# Patient Record
Sex: FEMALE | Race: WHITE | NOT HISPANIC OR LATINO | Employment: OTHER | URBAN - NONMETROPOLITAN AREA
[De-identification: names, ages, dates, MRNs, and addresses within clinical notes are randomized per-mention and may not be internally consistent; named-entity substitution may affect disease eponyms.]

---

## 2017-01-27 RX ORDER — TEMAZEPAM 30 MG/1
30 CAPSULE ORAL NIGHTLY PRN
Qty: 30 CAPSULE | Refills: 0 | Status: SHIPPED | OUTPATIENT
Start: 2017-01-27 | End: 2017-02-27 | Stop reason: SDUPTHER

## 2017-02-24 RX ORDER — BUPROPION HYDROCHLORIDE 300 MG/1
300 TABLET ORAL DAILY
Qty: 30 TABLET | Refills: 1 | Status: SHIPPED | OUTPATIENT
Start: 2017-02-24 | End: 2017-04-27 | Stop reason: SDUPTHER

## 2017-02-27 RX ORDER — TEMAZEPAM 30 MG/1
30 CAPSULE ORAL NIGHTLY PRN
Qty: 30 CAPSULE | Refills: 0 | Status: SHIPPED | OUTPATIENT
Start: 2017-02-27 | End: 2017-03-27 | Stop reason: SDUPTHER

## 2017-03-24 ENCOUNTER — PROCEDURE VISIT (OUTPATIENT)
Dept: INTERNAL MEDICINE | Facility: CLINIC | Age: 72
End: 2017-03-24

## 2017-03-24 VITALS
BODY MASS INDEX: 26.47 KG/M2 | WEIGHT: 164.7 LBS | DIASTOLIC BLOOD PRESSURE: 90 MMHG | SYSTOLIC BLOOD PRESSURE: 140 MMHG | HEIGHT: 66 IN

## 2017-03-24 DIAGNOSIS — Z13.820 ENCOUNTER FOR SCREENING FOR OSTEOPOROSIS: ICD-10-CM

## 2017-03-24 DIAGNOSIS — Z12.31 ENCOUNTER FOR SCREENING MAMMOGRAM FOR BREAST CANCER: ICD-10-CM

## 2017-03-24 DIAGNOSIS — Z01.419 WELL WOMAN EXAM WITH ROUTINE GYNECOLOGICAL EXAM: Primary | ICD-10-CM

## 2017-03-24 DIAGNOSIS — Z11.59 NEED FOR HEPATITIS C SCREENING TEST: ICD-10-CM

## 2017-03-24 DIAGNOSIS — E78.2 MIXED HYPERLIPIDEMIA: ICD-10-CM

## 2017-03-24 PROCEDURE — G0101 CA SCREEN;PELVIC/BREAST EXAM: HCPCS | Performed by: INTERNAL MEDICINE

## 2017-03-24 PROCEDURE — 88142 CYTOPATH C/V THIN LAYER: CPT | Performed by: INTERNAL MEDICINE

## 2017-03-24 NOTE — PROGRESS NOTES
"Subjective   History of Present Illness    Brunilda Horner is a 71 y.o. female who presents for GYN exam. She denies any GYN complaints. She performs BSE at home, no breast masses on self exam.  History of anxiety and depression, well controlled on current regimen.  History of hyperlipidemia, not on medications at present.        Sexually active?: Yes  Postmenopausal?: Yes  HRT?: No  Hysterectomy?: No    Date last pap:   History of abnormal Pap smear: yes - History of ASCUS  Date of last mammogram: .  History of abnormal mammogram: Yes. History of  Benign breast biopsy in  on the left.     /90  Ht 66\" (167.6 cm)  Wt 164 lb 11.2 oz (74.7 kg)  BMI 26.58 kg/m2    Past Medical History:   Diagnosis Date   • Anxiety     Generalized anxiety disorder    2015    • Blepharitis 2014   • Cellulitis of foot 2016   • Depression    • History of Papanicolaou smear of cervix 2009    negative   • Pure hypercholesterolemia 2015       Past Surgical History:   Procedure Laterality Date   • BREAST SURGERY  1994    Excision of mass, left breast. Mass, left breast   • COLONOSCOPY  2010    no polyps   • FOOT SURGERY  2001    Removal of tumor, radical resection of soft tissue, right foot. Recurrent plantar fibroma, plantar, right foot   • MAMMO STEREOTACTIC BREAST BIOPSY 1ST W WO DEVICE  1988    Needle localization and excision of micro-calcifications. Micro-calcifications, left breast   • SKIN BIOPSY  1991    Tangential excision keratoses left cheek. Exicison 25 papilloma, neck.       The following portions of the patient's history were reviewed and updated as appropriate: allergies, current medications, past family history, past medical history, past social history, past surgical history and problem list.    Review of Systems    Constitutional:  No fatigue, no weight loss, no weight gain, no fever, no chills   Respiratory: No dyspnea, no cough, no " hemoptysis, no wheezing, no pleuritic pain   Cardiovascular: No chest pain, no palpitations, no arrhythmia, no orthopnea, no nocturnal dyspnea, no edema, no claudication   Breasts: No discharge from nipple, No breast tenderness and No breast mass   Gastrointestinal: No loss of appetite, No dysphagia, No abdominal pain, No nausea, No vomiting, No change in bowel habits, No diarrhea, No constipation and No blood in stool   Genitourinary: No increased frequency of urination, No dysuria, No hematuria, No nocturia, No urinary incontinence, No vaginal discharge, No abnormal vaginal bleeding, No pelvic pain, No menstrual problem and No menopausal problem   Skin: No skin rash, No skin lesion, No dry skin, No pruritus and No nail problem   Neurologic: No headache, No dizziness, No lightheadedness, No syncope, No vertigo, No weakness, No numbness, No tremor and No paresthesia   Psychiatric: No difficulty sleeping, No mood swings, No confusion, No memory loss and Feeling anxious          Objective   Physical Exam    General:  Alert and oriented x 3, Cooperative, Well developed & well nourished and No acute distress   Abdomen: Soft, nondistended, non-tender, without masses or organomegaly   Breast: Inspection negative, no nipple discharge or bleeding, no masses or nodularity palpable and Symmetrical breasts in shape, size, consistency   Genitourinary: Vulva normal, vaginal mucosa normal, bladder nondistended and nontender, cervix normal, uterus normal size and nontender, no adnexal/pelvic tenderness or masses, Bartholin's/Datil/Urethral gland WNL. PAP smear obtained.   Skin: Skin warm and dry and Pattern of hair growth normal       Assessment/Plan    Diagnosis Plan   1. Well woman exam with routine gynecological exam  Cytology, thin prep pap (cervical)   2. Encounter for screening mammogram for breast cancer  Mammo Screening Digital Tomosynthesis Bilateral With CAD   3. Encounter for screening for osteoporosis  DEXA Bone  Density Axial   4. Mixed hyperlipidemia  Lipid Panel    Comprehensive Metabolic Panel    TSH   5. Need for hepatitis C screening test  Hepatitis C Antibody         All questions answered.  Recommended self breast exam  Pap smear obtained.  Labs today.  Mammogram.  Discussed healthy lifestyle modifications.  Recommended weight loss and regular physical activity.               Follow up for routine care.

## 2017-03-27 RX ORDER — TEMAZEPAM 30 MG/1
30 CAPSULE ORAL NIGHTLY PRN
Qty: 30 CAPSULE | Refills: 0 | Status: SHIPPED | OUTPATIENT
Start: 2017-03-27 | End: 2017-04-27 | Stop reason: SDUPTHER

## 2017-03-28 ENCOUNTER — TELEPHONE (OUTPATIENT)
Dept: INTERNAL MEDICINE | Facility: CLINIC | Age: 72
End: 2017-03-28

## 2017-03-28 LAB
LAB AP CASE REPORT: NORMAL
LAB AP GYN ADDITIONAL INFORMATION: NORMAL
LAB AP GYN OTHER FINDINGS: NORMAL
Lab: NORMAL
PATH INTERP SPEC-IMP: NORMAL
STAT OF ADQ CVX/VAG CYTO-IMP: NORMAL

## 2017-04-12 ENCOUNTER — TELEPHONE (OUTPATIENT)
Dept: INTERNAL MEDICINE | Facility: CLINIC | Age: 72
End: 2017-04-12

## 2017-04-12 NOTE — TELEPHONE ENCOUNTER
Spoke with pt let her know bonedensity shows osteopenia in hips ,spine normal DR ROBLEDO recommends weight bearing exercises and otc calcium 600mg bid and vitamin d 1000 units q day

## 2017-04-27 RX ORDER — TEMAZEPAM 30 MG/1
30 CAPSULE ORAL NIGHTLY PRN
Qty: 30 CAPSULE | Refills: 0 | Status: SHIPPED | OUTPATIENT
Start: 2017-04-27 | End: 2017-05-26 | Stop reason: SDUPTHER

## 2017-04-27 RX ORDER — BUPROPION HYDROCHLORIDE 300 MG/1
300 TABLET ORAL DAILY
Qty: 30 TABLET | Refills: 1 | Status: SHIPPED | OUTPATIENT
Start: 2017-04-27 | End: 2017-06-23 | Stop reason: SDUPTHER

## 2017-05-26 RX ORDER — TEMAZEPAM 30 MG/1
30 CAPSULE ORAL NIGHTLY PRN
Qty: 30 CAPSULE | Refills: 0 | Status: SHIPPED | OUTPATIENT
Start: 2017-05-26 | End: 2017-06-23 | Stop reason: SDUPTHER

## 2017-06-23 RX ORDER — BUPROPION HYDROCHLORIDE 300 MG/1
300 TABLET ORAL DAILY
Qty: 30 TABLET | Refills: 0 | Status: SHIPPED | OUTPATIENT
Start: 2017-06-23 | End: 2017-06-23

## 2017-06-23 RX ORDER — TEMAZEPAM 30 MG/1
30 CAPSULE ORAL NIGHTLY PRN
Qty: 30 CAPSULE | Refills: 0 | Status: SHIPPED | OUTPATIENT
Start: 2017-06-23 | End: 2017-07-14 | Stop reason: SDUPTHER

## 2017-06-23 RX ORDER — BUPROPION HYDROCHLORIDE 300 MG/1
300 TABLET ORAL DAILY
Qty: 30 TABLET | Refills: 0 | Status: SHIPPED | OUTPATIENT
Start: 2017-06-23 | End: 2017-07-14 | Stop reason: SDUPTHER

## 2017-07-14 ENCOUNTER — OFFICE VISIT (OUTPATIENT)
Dept: INTERNAL MEDICINE | Facility: CLINIC | Age: 72
End: 2017-07-14

## 2017-07-14 ENCOUNTER — APPOINTMENT (OUTPATIENT)
Dept: LAB | Facility: HOSPITAL | Age: 72
End: 2017-07-14

## 2017-07-14 VITALS
BODY MASS INDEX: 25.86 KG/M2 | SYSTOLIC BLOOD PRESSURE: 160 MMHG | HEIGHT: 66 IN | WEIGHT: 160.9 LBS | DIASTOLIC BLOOD PRESSURE: 90 MMHG

## 2017-07-14 DIAGNOSIS — Z79.899 LONG TERM PRESCRIPTION BENZODIAZEPINE USE: ICD-10-CM

## 2017-07-14 DIAGNOSIS — F32.A DEPRESSIVE DISORDER: ICD-10-CM

## 2017-07-14 DIAGNOSIS — S39.012A LOW BACK STRAIN, INITIAL ENCOUNTER: Primary | ICD-10-CM

## 2017-07-14 LAB
ALBUMIN SERPL-MCNC: 4.6 G/DL (ref 3.4–4.8)
ALBUMIN/GLOB SERPL: 1.4 G/DL (ref 1.1–1.8)
ALP SERPL-CCNC: 122 U/L (ref 38–126)
ALT SERPL W P-5'-P-CCNC: 36 U/L (ref 9–52)
AMPHET+METHAMPHET UR QL: NEGATIVE
ANION GAP SERPL CALCULATED.3IONS-SCNC: 12 MMOL/L (ref 5–15)
ARTICHOKE IGE QN: 152 MG/DL (ref 1–129)
AST SERPL-CCNC: 49 U/L (ref 14–36)
BARBITURATES UR QL SCN: NEGATIVE
BENZODIAZ UR QL SCN: POSITIVE
BILIRUB SERPL-MCNC: 0.4 MG/DL (ref 0.2–1.3)
BUN BLD-MCNC: 18 MG/DL (ref 7–21)
BUN/CREAT SERPL: 17 (ref 7–25)
CALCIUM SPEC-SCNC: 9.2 MG/DL (ref 8.4–10.2)
CANNABINOIDS SERPL QL: NEGATIVE
CHLORIDE SERPL-SCNC: 101 MMOL/L (ref 95–110)
CHOLEST SERPL-MCNC: 284 MG/DL (ref 0–199)
CO2 SERPL-SCNC: 27 MMOL/L (ref 22–31)
COCAINE UR QL: NEGATIVE
CREAT BLD-MCNC: 1.06 MG/DL (ref 0.5–1)
GFR SERPL CREATININE-BSD FRML MDRD: 51 ML/MIN/1.73 (ref 39–90)
GLOBULIN UR ELPH-MCNC: 3.4 GM/DL (ref 2.3–3.5)
GLUCOSE BLD-MCNC: 83 MG/DL (ref 60–100)
HCV AB SER DONR QL: NEGATIVE
HDLC SERPL-MCNC: 96 MG/DL (ref 60–200)
LDLC/HDLC SERPL: 1.64 {RATIO} (ref 0–3.22)
METHADONE UR QL SCN: NEGATIVE
OPIATES UR QL: NEGATIVE
OXYCODONE UR QL SCN: NEGATIVE
POTASSIUM BLD-SCNC: 4.7 MMOL/L (ref 3.5–5.1)
PROT SERPL-MCNC: 8 G/DL (ref 6.3–8.6)
SODIUM BLD-SCNC: 140 MMOL/L (ref 137–145)
TRIGL SERPL-MCNC: 152 MG/DL (ref 20–199)
TSH SERPL DL<=0.05 MIU/L-ACNC: 2.9 MIU/ML (ref 0.46–4.68)

## 2017-07-14 PROCEDURE — 80307 DRUG TEST PRSMV CHEM ANLYZR: CPT | Performed by: INTERNAL MEDICINE

## 2017-07-14 PROCEDURE — 80053 COMPREHEN METABOLIC PANEL: CPT | Performed by: INTERNAL MEDICINE

## 2017-07-14 PROCEDURE — 96372 THER/PROPH/DIAG INJ SC/IM: CPT | Performed by: INTERNAL MEDICINE

## 2017-07-14 PROCEDURE — 80061 LIPID PANEL: CPT | Performed by: INTERNAL MEDICINE

## 2017-07-14 PROCEDURE — 99213 OFFICE O/P EST LOW 20 MIN: CPT | Performed by: INTERNAL MEDICINE

## 2017-07-14 PROCEDURE — 84443 ASSAY THYROID STIM HORMONE: CPT | Performed by: INTERNAL MEDICINE

## 2017-07-14 PROCEDURE — 36415 COLL VENOUS BLD VENIPUNCTURE: CPT | Performed by: INTERNAL MEDICINE

## 2017-07-14 PROCEDURE — 86803 HEPATITIS C AB TEST: CPT | Performed by: INTERNAL MEDICINE

## 2017-07-14 RX ORDER — TEMAZEPAM 30 MG/1
30 CAPSULE ORAL NIGHTLY PRN
Qty: 30 CAPSULE | Refills: 2 | Status: SHIPPED | OUTPATIENT
Start: 2017-07-14 | End: 2017-10-19 | Stop reason: SDUPTHER

## 2017-07-14 RX ORDER — BUPROPION HYDROCHLORIDE 300 MG/1
300 TABLET ORAL DAILY
Qty: 30 TABLET | Refills: 5 | Status: SHIPPED | OUTPATIENT
Start: 2017-07-14 | End: 2017-07-21 | Stop reason: SDUPTHER

## 2017-07-14 RX ORDER — TIZANIDINE 2 MG/1
2 TABLET ORAL EVERY 8 HOURS PRN
Qty: 30 TABLET | Refills: 0 | Status: SHIPPED | OUTPATIENT
Start: 2017-07-14 | End: 2017-07-21

## 2017-07-14 RX ORDER — MELOXICAM 7.5 MG/1
7.5 TABLET ORAL DAILY
Qty: 30 TABLET | Refills: 0 | Status: SHIPPED | OUTPATIENT
Start: 2017-07-14 | End: 2017-10-19

## 2017-07-14 RX ORDER — TRIAMCINOLONE ACETONIDE 40 MG/ML
40 INJECTION, SUSPENSION INTRA-ARTICULAR; INTRAMUSCULAR ONCE
Status: COMPLETED | OUTPATIENT
Start: 2017-07-14 | End: 2017-07-14

## 2017-07-14 RX ADMIN — TRIAMCINOLONE ACETONIDE 40 MG: 40 INJECTION, SUSPENSION INTRA-ARTICULAR; INTRAMUSCULAR at 11:52

## 2017-07-14 NOTE — PROGRESS NOTES
Subjective   Brunilda Horner is a 72 y.o. female     Patient is in complaining of low back pain radiating to the left hip. He twisted her back couple of weeks ago, and has been having problems sine then. Pain is moderate in severity, worse with movement.Has been taking NSAIDs OTC with mild improvement. Denies any numbness or weakness in lower extremity.    Patient is also for recheck on depression. She is doing well on Wellbutrin. Off Celexa.Takes Restoril for insomnia.  Tolerates medications well without any side effects.    Past Medical History:   Diagnosis Date   • Anxiety     Generalized anxiety disorder    12/30/2015    • Blepharitis 06/12/2014   • Cellulitis of foot 07/12/2016   • Depression    • History of Papanicolaou smear of cervix 06/03/2009    negative   • Pure hypercholesterolemia 12/30/2015     Past Surgical History:   Procedure Laterality Date   • BREAST BIOPSY     • BREAST SURGERY  05/31/1994    Excision of mass, left breast. Mass, left breast   • COLONOSCOPY  01/01/2010    no polyps   • FOOT SURGERY  05/08/2001    Removal of tumor, radical resection of soft tissue, right foot. Recurrent plantar fibroma, plantar, right foot   • MAMMO STEREOTACTIC BREAST BIOPSY 1ST W WO DEVICE  06/08/1988    Needle localization and excision of micro-calcifications. Micro-calcifications, left breast   • SKIN BIOPSY  06/06/1991    Tangential excision keratoses left cheek. Exicison 25 papilloma, neck.       Social History   Substance Use Topics   • Smoking status: Former Smoker     Packs/day: 1.00     Years: 10.00     Quit date: 1/1/1976   • Smokeless tobacco: Not on file   • Alcohol use No     Family History   Problem Relation Age of Onset   • Asthma Other    • Breast cancer Other    • Cancer Other    • Heart disease Other    • Breast cancer Paternal Aunt      No Known Allergies  Current Outpatient Prescriptions on File Prior to Visit   Medication Sig Dispense Refill   • cholecalciferol (VITAMIN D3) 1000 UNITS tablet  Take 1,000 Units by mouth daily.     • COENZYME Q-10 PO Take  by mouth.     • Multiple Vitamins-Minerals (MULTIVITAMIN ADULT) tablet Take  by mouth.     • Omega-3 Fatty Acids (OMEGA-3 PO) Take  by mouth.     • SELENIUM PO Take  by mouth.     • vitamin E 400 UNIT capsule Take 400 Units by mouth daily.     • [DISCONTINUED] atorvastatin (LIPITOR) 20 MG tablet Take 20 mg by mouth daily.     • [DISCONTINUED] buPROPion XL (WELLBUTRIN XL) 300 MG 24 hr tablet Take 1 tablet by mouth Daily. 30 tablet 0   • [DISCONTINUED] mometasone (NASONEX) 50 MCG/ACT nasal spray 2 sprays into each nostril daily.     • [DISCONTINUED] temazepam (RESTORIL) 30 MG capsule Take 1 capsule by mouth At Night As Needed for Sleep. 30 capsule 0   • [DISCONTINUED] vitamin B-12 (CYANOCOBALAMIN) 1000 MCG tablet Take 1,000 mcg by mouth daily.     • [DISCONTINUED] ondansetron ODT (ZOFRAN-ODT) 4 MG disintegrating tablet DISSOLVE 1 TABLET BY MOUTH EVERY 6 HOURS AS NEEDED FOR NAUSEA AND VOMITING  0   • [DISCONTINUED] promethazine (PHENERGAN) 12.5 MG tablet Take 1 tablet by mouth Every 8 (Eight) Hours As Needed for nausea or vomiting. 15 tablet 0     No current facility-administered medications on file prior to visit.      Review of Systems   Constitutional: Negative for activity change and fatigue.   HENT: Negative.    Eyes: Negative.    Respiratory: Negative for chest tightness and shortness of breath.    Cardiovascular: Negative for chest pain and leg swelling.   Endocrine: Negative for cold intolerance and heat intolerance.   Genitourinary: Negative for difficulty urinating and flank pain.   Musculoskeletal: Positive for back pain. Negative for arthralgias, gait problem and neck pain.   Neurological: Negative for dizziness, light-headedness and headaches.   Psychiatric/Behavioral: Negative for sleep disturbance. The patient is not nervous/anxious.        Objective   Physical Exam   Constitutional: She is oriented to person, place, and time. She appears  well-developed and well-nourished.   HENT:   Head: Normocephalic and atraumatic.   Eyes: EOM are normal. Pupils are equal, round, and reactive to light.   Neck: Neck supple. No thyromegaly present.   Cardiovascular: Normal rate and regular rhythm.    Pulmonary/Chest: Effort normal and breath sounds normal.   Abdominal: Soft. Bowel sounds are normal. She exhibits no mass.   Musculoskeletal:        Lumbar back: She exhibits tenderness, pain and spasm.   Neurological: She is alert and oriented to person, place, and time. She has normal reflexes. She displays normal reflexes. She exhibits normal muscle tone.   Skin: Skin is warm and dry.   Psychiatric: She has a normal mood and affect. Her behavior is normal.   Nursing note and vitals reviewed.          Patient Active Problem List   Diagnosis   • Generalized anxiety disorder   • Chronic depression   • Traumatic hematoma of face               Assessment    Diagnosis Plan   1. Low back strain, initial encounter  triamcinolone acetonide (KENALOG-40) injection 40 mg   2. Depressive disorder     3. Long term prescription benzodiazepine use  Urine Drug Screen         Plan      1. Kenalog 40 mg IM.      Mobic 15 mg daily.      Zanaflex 2 mg q 8hrs PRN.      Heating pad.  2. Patient will continue Wellbutrin.      Refill given on Restoril.      Advised about habit forming nature of the medication.      Advised to try to take it PRN.      Urine drug screen ordered.      Robin reviewed.      Follow up if not improved or for routine care in 3 months.

## 2017-07-17 ENCOUNTER — TELEPHONE (OUTPATIENT)
Dept: INTERNAL MEDICINE | Facility: CLINIC | Age: 72
End: 2017-07-17

## 2017-07-17 RX ORDER — ATORVASTATIN CALCIUM 10 MG/1
10 TABLET, FILM COATED ORAL DAILY
Qty: 90 TABLET | Refills: 0 | Status: SHIPPED | OUTPATIENT
Start: 2017-07-17 | End: 2017-10-18 | Stop reason: SDUPTHER

## 2017-07-17 NOTE — TELEPHONE ENCOUNTER
Spoke with pt let ehr know lab shows cholesterol was up DR ROBLEDO recommends lipitor 10 mg q day and this was sent to pharmacy

## 2017-07-21 RX ORDER — CYCLOBENZAPRINE HCL 10 MG
10 TABLET ORAL 3 TIMES DAILY PRN
Qty: 90 TABLET | Refills: 0 | Status: SHIPPED | OUTPATIENT
Start: 2017-07-21 | End: 2017-10-19 | Stop reason: SDUPTHER

## 2017-07-21 RX ORDER — BUPROPION HYDROCHLORIDE 300 MG/1
300 TABLET ORAL DAILY
Qty: 30 TABLET | Refills: 5 | Status: SHIPPED | OUTPATIENT
Start: 2017-07-21 | End: 2018-01-18 | Stop reason: SDUPTHER

## 2017-10-18 RX ORDER — ATORVASTATIN CALCIUM 10 MG/1
10 TABLET, FILM COATED ORAL DAILY
Qty: 90 TABLET | Refills: 0 | Status: SHIPPED | OUTPATIENT
Start: 2017-10-18 | End: 2018-01-18 | Stop reason: SDUPTHER

## 2017-10-19 ENCOUNTER — OFFICE VISIT (OUTPATIENT)
Dept: INTERNAL MEDICINE | Facility: CLINIC | Age: 72
End: 2017-10-19

## 2017-10-19 VITALS
WEIGHT: 155.7 LBS | SYSTOLIC BLOOD PRESSURE: 160 MMHG | HEIGHT: 66 IN | DIASTOLIC BLOOD PRESSURE: 80 MMHG | BODY MASS INDEX: 25.02 KG/M2

## 2017-10-19 DIAGNOSIS — R03.0 BLOOD PRESSURE ELEVATED WITHOUT HISTORY OF HTN: ICD-10-CM

## 2017-10-19 DIAGNOSIS — F32.A DEPRESSIVE DISORDER: ICD-10-CM

## 2017-10-19 DIAGNOSIS — J01.00 ACUTE NON-RECURRENT MAXILLARY SINUSITIS: Primary | ICD-10-CM

## 2017-10-19 DIAGNOSIS — E78.2 MIXED HYPERLIPIDEMIA: ICD-10-CM

## 2017-10-19 PROCEDURE — 99214 OFFICE O/P EST MOD 30 MIN: CPT | Performed by: INTERNAL MEDICINE

## 2017-10-19 RX ORDER — TEMAZEPAM 30 MG/1
30 CAPSULE ORAL NIGHTLY PRN
Qty: 30 CAPSULE | Refills: 2 | Status: SHIPPED | OUTPATIENT
Start: 2017-10-19 | End: 2018-01-19 | Stop reason: SDUPTHER

## 2017-10-19 RX ORDER — AMOXICILLIN 500 MG/1
500 CAPSULE ORAL 3 TIMES DAILY
Qty: 21 CAPSULE | Refills: 0 | Status: SHIPPED | OUTPATIENT
Start: 2017-10-19 | End: 2018-02-13

## 2017-10-19 RX ORDER — CYCLOBENZAPRINE HCL 10 MG
10 TABLET ORAL 3 TIMES DAILY PRN
Qty: 30 TABLET | Refills: 0 | Status: SHIPPED | OUTPATIENT
Start: 2017-10-19 | End: 2017-12-12 | Stop reason: SDUPTHER

## 2017-10-19 NOTE — PATIENT INSTRUCTIONS
"DASH Eating Plan  DASH stands for \"Dietary Approaches to Stop Hypertension.\" The DASH eating plan is a healthy eating plan that has been shown to reduce high blood pressure (hypertension). Additional health benefits may include reducing the risk of type 2 diabetes mellitus, heart disease, and stroke. The DASH eating plan may also help with weight loss.  WHAT DO I NEED TO KNOW ABOUT THE DASH EATING PLAN?  For the DASH eating plan, you will follow these general guidelines:  · Choose foods with less than 150 milligrams of sodium per serving (as listed on the food label).  · Use salt-free seasonings or herbs instead of table salt or sea salt.  · Check with your health care provider or pharmacist before using salt substitutes.  · Eat lower-sodium products. These are often labeled as \"low-sodium\" or \"no salt added.\"  · Eat fresh foods. Avoid eating a lot of canned foods.  · Eat more vegetables, fruits, and low-fat dairy products.  · Choose whole grains. Look for the word \"whole\" as the first word in the ingredient list.  · Choose fish and skinless chicken or turkey more often than red meat. Limit fish, poultry, and meat to 6 oz (170 g) each day.  · Limit sweets, desserts, sugars, and sugary drinks.  · Choose heart-healthy fats.  · Eat more home-cooked food and less restaurant, buffet, and fast food.  · Limit fried foods.  · Do not javed foods. Cook foods using methods such as baking, boiling, grilling, and broiling instead.  · When eating at a restaurant, ask that your food be prepared with less salt, or no salt if possible.  WHAT FOODS CAN I EAT?  Seek help from a dietitian for individual calorie needs.  Grains  Whole grain or whole wheat bread. Brown rice. Whole grain or whole wheat pasta. Quinoa, bulgur, and whole grain cereals. Low-sodium cereals. Corn or whole wheat flour tortillas. Whole grain cornbread. Whole grain crackers. Low-sodium crackers.  Vegetables  Fresh or frozen vegetables (raw, steamed, roasted, or " grilled). Low-sodium or reduced-sodium tomato and vegetable juices. Low-sodium or reduced-sodium tomato sauce and paste. Low-sodium or reduced-sodium canned vegetables.   Fruits  All fresh, canned (in natural juice), or frozen fruits.  Meat and Other Protein Products  Ground beef (85% or leaner), grass-fed beef, or beef trimmed of fat. Skinless chicken or turkey. Ground chicken or turkey. Pork trimmed of fat. All fish and seafood. Eggs. Dried beans, peas, or lentils. Unsalted nuts and seeds. Unsalted canned beans.  Dairy  Low-fat dairy products, such as skim or 1% milk, 2% or reduced-fat cheeses, low-fat ricotta or cottage cheese, or plain low-fat yogurt. Low-sodium or reduced-sodium cheeses.  Fats and Oils  Tub margarines without trans fats. Light or reduced-fat mayonnaise and salad dressings (reduced sodium). Avocado. Safflower, olive, or canola oils. Natural peanut or almond butter.  Other  Unsalted popcorn and pretzels.  The items listed above may not be a complete list of recommended foods or beverages. Contact your dietitian for more options.  WHAT FOODS ARE NOT RECOMMENDED?  Grains  White bread. White pasta. White rice. Refined cornbread. Bagels and croissants. Crackers that contain trans fat.  Vegetables  Creamed or fried vegetables. Vegetables in a cheese sauce. Regular canned vegetables. Regular canned tomato sauce and paste. Regular tomato and vegetable juices.  Fruits  Canned fruit in light or heavy syrup. Fruit juice.  Meat and Other Protein Products  Fatty cuts of meat. Ribs, chicken wings, cote, sausage, bologna, salami, chitterlings, fatback, hot dogs, bratwurst, and packaged luncheon meats. Salted nuts and seeds. Canned beans with salt.  Dairy  Whole or 2% milk, cream, half-and-half, and cream cheese. Whole-fat or sweetened yogurt. Full-fat cheeses or blue cheese. Nondairy creamers and whipped toppings. Processed cheese, cheese spreads, or cheese curds.  Condiments  Onion and garlic salt, seasoned  salt, table salt, and sea salt. Canned and packaged gravies. Worcestershire sauce. Tartar sauce. Barbecue sauce. Teriyaki sauce. Soy sauce, including reduced sodium. Steak sauce. Fish sauce. Oyster sauce. Cocktail sauce. Horseradish. Ketchup and mustard. Meat flavorings and tenderizers. Bouillon cubes. Hot sauce. Tabasco sauce. Marinades. Taco seasonings. Relishes.  Fats and Oils  Butter, stick margarine, lard, shortening, ghee, and cote fat. Coconut, palm kernel, or palm oils. Regular salad dressings.  Other  Pickles and olives. Salted popcorn and pretzels.  The items listed above may not be a complete list of foods and beverages to avoid. Contact your dietitian for more information.  WHERE CAN I FIND MORE INFORMATION?  National [employer], [employer], [employer] [place]: www.nhlbi.nih.gov/health/health-topics/topics/dash/     This information is not intended to replace advice given to you by your health care provider. Make sure you discuss any questions you have with your health care provider.     Document Released: 12/06/2012 Document Revised: 04/10/2017 Document Reviewed: 10/22/2014  Elsevier Interactive Patient Education ©2017 [employer]

## 2017-10-19 NOTE — PROGRESS NOTES
Subjective   Brunilda Horner is a 72 y.o. female       Patient is in complaining of sinus congestion, nasal congestion and sore throat. Has some nonproductive cough.  Complains of not feeling well for the last few days.  She took OTC antihistamines without much improvement.      Also for recheck on depression and hyperlipidemia.    Wellbutrin is working well. Denies anxiety or panic attacks. Takes Restoril for insomnia.  Denies any side effects related to the medications.    Hyperlipidemia.  Patient was started on Lipitor last visit.  She has been eating healthier and has been avoiding fried foods.  Tolertes Lipitor well. No myalgia or myositis.        Past Medical History:   Diagnosis Date   • Anxiety     Generalized anxiety disorder    12/30/2015    • Blepharitis 06/12/2014   • Cellulitis of foot 07/12/2016   • Depression    • History of Papanicolaou smear of cervix 06/03/2009    negative   • Pure hypercholesterolemia 12/30/2015     Past Surgical History:   Procedure Laterality Date   • BREAST BIOPSY     • BREAST SURGERY  05/31/1994    Excision of mass, left breast. Mass, left breast   • COLONOSCOPY  01/01/2010    no polyps   • FOOT SURGERY  05/08/2001    Removal of tumor, radical resection of soft tissue, right foot. Recurrent plantar fibroma, plantar, right foot   • MAMMO STEREOTACTIC BREAST BIOPSY 1ST W WO DEVICE  06/08/1988    Needle localization and excision of micro-calcifications. Micro-calcifications, left breast   • SKIN BIOPSY  06/06/1991    Tangential excision keratoses left cheek. Exicison 25 papilloma, neck.       Social History   Substance Use Topics   • Smoking status: Former Smoker     Packs/day: 1.00     Years: 10.00     Quit date: 1/1/1976   • Smokeless tobacco: Never Used   • Alcohol use No     Family History   Problem Relation Age of Onset   • Asthma Other    • Breast cancer Other    • Cancer Other    • Heart disease Other    • Breast cancer Paternal Aunt      No Known Allergies  Current  Outpatient Prescriptions on File Prior to Visit   Medication Sig Dispense Refill   • atorvastatin (LIPITOR) 10 MG tablet Take 1 tablet by mouth Daily. 90 tablet 0   • buPROPion XL (WELLBUTRIN XL) 300 MG 24 hr tablet Take 1 tablet by mouth Daily. 30 tablet 5   • cholecalciferol (VITAMIN D3) 1000 UNITS tablet Take 1,000 Units by mouth daily.     • COENZYME Q-10 PO Take  by mouth.     • cyclobenzaprine (FLEXERIL) 10 MG tablet Take 1 tablet by mouth 3 (Three) Times a Day As Needed for Muscle Spasms. 90 tablet 0   • Multiple Vitamins-Minerals (MULTIVITAMIN ADULT) tablet Take  by mouth.     • Omega-3 Fatty Acids (OMEGA-3 PO) Take  by mouth.     • SELENIUM PO Take  by mouth.     • vitamin E 400 UNIT capsule Take 400 Units by mouth daily.     • [DISCONTINUED] temazepam (RESTORIL) 30 MG capsule Take 1 capsule by mouth At Night As Needed for Sleep. 30 capsule 2   • [DISCONTINUED] meloxicam (MOBIC) 7.5 MG tablet Take 1 tablet by mouth Daily. 30 tablet 0     No current facility-administered medications on file prior to visit.      Review of Systems   HENT: Positive for congestion and sore throat.    Cardiovascular: Negative for chest pain, palpitations and leg swelling.   Gastrointestinal: Negative for abdominal pain, constipation and diarrhea.   Endocrine: Negative for cold intolerance, heat intolerance, polydipsia and polyuria.   Musculoskeletal: Negative for arthralgias and back pain.   Neurological: Negative for dizziness and light-headedness.   Psychiatric/Behavioral: Positive for sleep disturbance. The patient is not nervous/anxious.        Objective   Physical Exam   Constitutional: She is oriented to person, place, and time. She appears well-developed and well-nourished.   HENT:   Head: Normocephalic and atraumatic.   Nose: Mucosal edema present.   Mouth/Throat: Posterior oropharyngeal erythema present.   Eyes: Conjunctivae and EOM are normal.   Neck: Neck supple. No JVD present. No thyromegaly present.    Cardiovascular: Normal rate and regular rhythm.    No murmur heard.  Pulmonary/Chest: Effort normal and breath sounds normal.   Abdominal: Soft. She exhibits no mass.   Lymphadenopathy:     She has no cervical adenopathy.   Neurological: She is alert and oriented to person, place, and time. She has normal reflexes.   Skin: Skin is warm and dry. No rash noted.   Psychiatric: She has a normal mood and affect.           Patient Active Problem List   Diagnosis   • Generalized anxiety disorder   • Chronic depression   • Traumatic hematoma of face             Assessment   Diagnosis Plan   1. Acute non-recurrent maxillary sinusitis     2. Depressive disorder     3. Blood pressure elevated without history of HTN     4. Mixed hyperlipidemia  Lipid Panel    Comprehensive Metabolic Panel         Plan        1. Amoxicillin 500 mg tid for 7 days      Mucinex 600 mg bid.      Nasacort AQ 2 puffs daily OTC.      Advised to increase intake of fluids.  2. Patient will continue Wellbutrin.       Refilled Restoril.       Advised about habit forming nature of the medication.       Patient signed [facility] consent for treatment with controlled substance and provider agreement.  3. Patient is advised to continue monitoring BP at home.      DASH.      1.5 gr Sodium restriction.  4. Lipids and LFTs will be recheck.      Lipitor is continued.      Counseled on nutrition and physical activity.            Follow up in 4 months.                    This document has been electronically signed by Swetha Rashid MD on October 19, 2017 4:10 PM

## 2017-12-12 ENCOUNTER — TELEPHONE (OUTPATIENT)
Dept: INTERNAL MEDICINE | Facility: CLINIC | Age: 72
End: 2017-12-12

## 2017-12-12 RX ORDER — CYCLOBENZAPRINE HCL 10 MG
10 TABLET ORAL 3 TIMES DAILY PRN
Qty: 30 TABLET | Refills: 0 | Status: SHIPPED | OUTPATIENT
Start: 2017-12-12 | End: 2018-02-08 | Stop reason: SDUPTHER

## 2017-12-12 NOTE — TELEPHONE ENCOUNTER
Pt needs a refill on flexeril 10 mg sent to [facility].  She said she talked to you Monday and you said you would fill it but she doesn't have it yet.

## 2018-01-04 ENCOUNTER — APPOINTMENT (OUTPATIENT)
Dept: LAB | Facility: HOSPITAL | Age: 73
End: 2018-01-04

## 2018-01-04 LAB
ALBUMIN SERPL-MCNC: 4.4 G/DL (ref 3.4–4.8)
ALBUMIN/GLOB SERPL: 1.3 G/DL (ref 1.1–1.8)
ALP SERPL-CCNC: 93 U/L (ref 38–126)
ALT SERPL W P-5'-P-CCNC: 37 U/L (ref 9–52)
ANION GAP SERPL CALCULATED.3IONS-SCNC: 9 MMOL/L (ref 5–15)
ARTICHOKE IGE QN: 79 MG/DL (ref 1–129)
AST SERPL-CCNC: 42 U/L (ref 14–36)
BILIRUB SERPL-MCNC: 0.3 MG/DL (ref 0.2–1.3)
BUN BLD-MCNC: 22 MG/DL (ref 7–21)
BUN/CREAT SERPL: 19.5 (ref 7–25)
CALCIUM SPEC-SCNC: 10 MG/DL (ref 8.4–10.2)
CHLORIDE SERPL-SCNC: 101 MMOL/L (ref 95–110)
CHOLEST SERPL-MCNC: 186 MG/DL (ref 0–199)
CO2 SERPL-SCNC: 30 MMOL/L (ref 22–31)
CREAT BLD-MCNC: 1.13 MG/DL (ref 0.5–1)
GFR SERPL CREATININE-BSD FRML MDRD: 47 ML/MIN/1.73 (ref 39–90)
GLOBULIN UR ELPH-MCNC: 3.4 GM/DL (ref 2.3–3.5)
GLUCOSE BLD-MCNC: 76 MG/DL (ref 60–100)
HDLC SERPL-MCNC: 73 MG/DL (ref 60–200)
LDLC/HDLC SERPL: 1.17 {RATIO} (ref 0–3.22)
POTASSIUM BLD-SCNC: 4.8 MMOL/L (ref 3.5–5.1)
PROT SERPL-MCNC: 7.8 G/DL (ref 6.3–8.6)
SODIUM BLD-SCNC: 140 MMOL/L (ref 137–145)
TRIGL SERPL-MCNC: 137 MG/DL (ref 20–199)

## 2018-01-04 PROCEDURE — 36415 COLL VENOUS BLD VENIPUNCTURE: CPT | Performed by: INTERNAL MEDICINE

## 2018-01-04 PROCEDURE — 80061 LIPID PANEL: CPT | Performed by: INTERNAL MEDICINE

## 2018-01-04 PROCEDURE — 80053 COMPREHEN METABOLIC PANEL: CPT | Performed by: INTERNAL MEDICINE

## 2018-01-05 ENCOUNTER — TELEPHONE (OUTPATIENT)
Dept: INTERNAL MEDICINE | Facility: CLINIC | Age: 73
End: 2018-01-05

## 2018-01-05 NOTE — TELEPHONE ENCOUNTER
SPOKE WITH PT LET HER KNOW LAB SHOWS CHOLESTEROL WAS OK ,KIDNEY FUNCTION A LITTLE ABNORMAL DR ROBLEDO RECOMMENDS SHE NOT TAKE NSAIDS PT STSTED SHE WAS TAKINFG A LOT OF ALEVE I LET HER KNOW TO STOP TAKING THAT AND INCREASE INTAKE OF FLUIDS ,THEN RECHECK SMA7 IN 1 MONYH

## 2018-01-18 RX ORDER — BUPROPION HYDROCHLORIDE 300 MG/1
300 TABLET ORAL DAILY
Qty: 30 TABLET | Refills: 0 | Status: SHIPPED | OUTPATIENT
Start: 2018-01-18 | End: 2018-02-19 | Stop reason: SDUPTHER

## 2018-01-18 RX ORDER — ATORVASTATIN CALCIUM 10 MG/1
10 TABLET, FILM COATED ORAL DAILY
Qty: 30 TABLET | Refills: 0 | Status: SHIPPED | OUTPATIENT
Start: 2018-01-18 | End: 2018-02-19 | Stop reason: SDUPTHER

## 2018-01-19 RX ORDER — TEMAZEPAM 30 MG/1
30 CAPSULE ORAL NIGHTLY PRN
Qty: 30 CAPSULE | Refills: 0 | Status: SHIPPED | OUTPATIENT
Start: 2018-01-19 | End: 2018-02-19 | Stop reason: SDUPTHER

## 2018-01-23 ENCOUNTER — OFFICE VISIT (OUTPATIENT)
Dept: INTERNAL MEDICINE | Facility: CLINIC | Age: 73
End: 2018-01-23

## 2018-01-23 VITALS
DIASTOLIC BLOOD PRESSURE: 80 MMHG | HEIGHT: 66 IN | WEIGHT: 153.2 LBS | SYSTOLIC BLOOD PRESSURE: 145 MMHG | BODY MASS INDEX: 24.62 KG/M2

## 2018-01-23 DIAGNOSIS — M54.50 BILATERAL LOW BACK PAIN WITHOUT SCIATICA, UNSPECIFIED CHRONICITY: Primary | ICD-10-CM

## 2018-01-23 DIAGNOSIS — M25.552 PAIN OF BOTH HIP JOINTS: ICD-10-CM

## 2018-01-23 DIAGNOSIS — M25.551 PAIN OF BOTH HIP JOINTS: ICD-10-CM

## 2018-01-23 PROCEDURE — 99213 OFFICE O/P EST LOW 20 MIN: CPT | Performed by: INTERNAL MEDICINE

## 2018-01-23 PROCEDURE — 96372 THER/PROPH/DIAG INJ SC/IM: CPT | Performed by: INTERNAL MEDICINE

## 2018-01-23 RX ORDER — TRIAMCINOLONE ACETONIDE 40 MG/ML
40 INJECTION, SUSPENSION INTRA-ARTICULAR; INTRAMUSCULAR ONCE
Status: COMPLETED | OUTPATIENT
Start: 2018-01-23 | End: 2018-01-23

## 2018-01-23 RX ORDER — TRAMADOL HYDROCHLORIDE 50 MG/1
50 TABLET ORAL EVERY 8 HOURS PRN
Qty: 30 TABLET | Refills: 0 | Status: SHIPPED | OUTPATIENT
Start: 2018-01-23 | End: 2018-02-08 | Stop reason: SDUPTHER

## 2018-01-23 RX ADMIN — TRIAMCINOLONE ACETONIDE 40 MG: 40 INJECTION, SUSPENSION INTRA-ARTICULAR; INTRAMUSCULAR at 15:31

## 2018-01-26 NOTE — PROGRESS NOTES
Subjective   Brunilda Horner is a 72 y.o. female       Patient is in complaining of low back and hip pain worsening over the the last few weeks.  Pain is in lower back area and radiates to her hips.Has been having more pain in the right hip on ambulation.  Alleve was helping but she discontinued as it was causing elevation in her blood pressure and some rise in creatinine. Takes Flexeril at night for pain and muscular spasms.  Hip Pain    The incident occurred more than 1 week ago. The pain is present in the left hip and right hip. The pain is mild. The pain has been intermittent since onset. Pertinent negatives include no inability to bear weight or numbness. She has tried NSAIDs for the symptoms. The treatment provided moderate relief.   Back Pain   The current episode started 1 to 4 weeks ago. The problem occurs intermittently. The pain is present in the sacro-iliac. The quality of the pain is described as aching. The symptoms are aggravated by position. Pertinent negatives include no abdominal pain, chest pain, dysuria, headaches, numbness, paresthesias, pelvic pain or weakness. Risk factors include sedentary lifestyle. She has tried muscle relaxant and NSAIDs for the symptoms. The treatment provided moderate relief.       Past Medical History:   Diagnosis Date   • Anxiety     Generalized anxiety disorder    12/30/2015    • Blepharitis 06/12/2014   • Cellulitis of foot 07/12/2016   • Depression    • History of Papanicolaou smear of cervix 06/03/2009    negative   • Pure hypercholesterolemia 12/30/2015     Past Surgical History:   Procedure Laterality Date   • BREAST BIOPSY     • BREAST SURGERY  05/31/1994    Excision of mass, left breast. Mass, left breast   • COLONOSCOPY  01/01/2010    no polyps   • FOOT SURGERY  05/08/2001    Removal of tumor, radical resection of soft tissue, right foot. Recurrent plantar fibroma, plantar, right foot   • MAMMO STEREOTACTIC BREAST BIOPSY 1ST W WO DEVICE  06/08/1988    Needle  localization and excision of micro-calcifications. Micro-calcifications, left breast   • SKIN BIOPSY  06/06/1991    Tangential excision keratoses left cheek. Exicison 25 papilloma, neck.       Social History   Substance Use Topics   • Smoking status: Former Smoker     Packs/day: 1.00     Years: 10.00     Quit date: 1/1/1976   • Smokeless tobacco: Never Used   • Alcohol use No     Family History   Problem Relation Age of Onset   • Asthma Other    • Breast cancer Other    • Cancer Other    • Heart disease Other    • Breast cancer Paternal Aunt      No Known Allergies  Current Outpatient Prescriptions on File Prior to Visit   Medication Sig Dispense Refill   • amoxicillin (AMOXIL) 500 MG capsule Take 1 capsule by mouth 3 (Three) Times a Day. 21 capsule 0   • atorvastatin (LIPITOR) 10 MG tablet Take 1 tablet by mouth Daily. 30 tablet 0   • buPROPion XL (WELLBUTRIN XL) 300 MG 24 hr tablet Take 1 tablet by mouth Daily. 30 tablet 0   • cholecalciferol (VITAMIN D3) 1000 UNITS tablet Take 1,000 Units by mouth daily.     • COENZYME Q-10 PO Take  by mouth.     • cyclobenzaprine (FLEXERIL) 10 MG tablet Take 1 tablet by mouth 3 (Three) Times a Day As Needed for Muscle Spasms. 30 tablet 0   • Multiple Vitamins-Minerals (MULTIVITAMIN ADULT) tablet Take  by mouth.     • Omega-3 Fatty Acids (OMEGA-3 PO) Take  by mouth.     • SELENIUM PO Take  by mouth.     • temazepam (RESTORIL) 30 MG capsule Take 1 capsule by mouth At Night As Needed for Sleep. 30 capsule 0   • vitamin E 400 UNIT capsule Take 400 Units by mouth daily.       No current facility-administered medications on file prior to visit.      Review of Systems   Constitutional: Negative for activity change, fatigue and unexpected weight change.   Respiratory: Negative for chest tightness and shortness of breath.    Cardiovascular: Negative for chest pain and leg swelling.   Gastrointestinal: Negative for abdominal pain, constipation and diarrhea.   Genitourinary: Negative for  dysuria, flank pain, frequency and pelvic pain.   Musculoskeletal: Positive for back pain.        Positive for hip pain   Neurological: Negative for dizziness, weakness, numbness, headaches and paresthesias.   Psychiatric/Behavioral: Positive for sleep disturbance.       Objective   Physical Exam   Constitutional: She is oriented to person, place, and time. She appears well-developed and well-nourished.   HENT:   Nose: Nose normal.   Neck: Neck supple. No thyromegaly present.   Cardiovascular: Regular rhythm.    No murmur heard.  Pulmonary/Chest: Effort normal and breath sounds normal.   Abdominal: Soft. Bowel sounds are normal. She exhibits no mass.   Musculoskeletal:        Right hip: She exhibits tenderness. She exhibits no deformity.        Left hip: She exhibits tenderness. She exhibits no swelling and no deformity.        Lumbar back: She exhibits tenderness.   Straight leg raise is negative   Neurological: She is alert and oriented to person, place, and time. She has normal strength and normal reflexes. She displays no atrophy. No sensory deficit. She exhibits normal muscle tone.   Skin: Skin is warm and dry. No rash noted.   Psychiatric: She has a normal mood and affect. Her behavior is normal.           Patient Active Problem List   Diagnosis   • Generalized anxiety disorder   • Chronic depression   • Traumatic hematoma of face          INDICATION:      back pain, M54.5 Low back pain  COMPARISON:    none          FINDINGS:            - Fracture(s):      none        - Alignment:      within normal limits          - Disc space heights:    within normal limits for age          - Focal osteolytic/blastic: none         - Bone density:    grossly within normal limits for age        - misc.:      Moderate mid and inferior degenerative facet  changes         .     IMPRESSION:  CONCLUSION:         1.  Age-related degenerative changes               EXAM:  Radiograph(s), Osseous         REGION:   Pelvis and bilateral  hips    VIEWS:   3         INDICATION:    back pain, hip pain, M25.551 Pain in right hip  M25.552 Pain in left hip     COMPARISON:    none             FINDINGS:         No evidence of a fracture or bony malalignment.     No evidence of osteolytic/osteoblastic disease.     Age-related degenerative changes, with joint space narrowing,  greater on the right..       .      IMPRESSION:  CONCLUSION:         1. Age-related degenerative changes, greater on the right.              Assessment/Plan   Brunilda was seen today for hip pain and back pain.    Diagnoses and all orders for this visit:    Bilateral low back pain without sciatica, unspecified chronicity  -     XR Spine Lumbar 2 or 3 View  -     triamcinolone acetonide (KENALOG-40) injection 40 mg; Inject 1 mL into the shoulder, thigh, or buttocks 1 (One) Time.  -     Ambulatory Referral to Physical Therapy Evaluate and treat    Pain of both hip joints  -     XR hips bilateral w or wo pelvis 2 view  -     triamcinolone acetonide (KENALOG-40) injection 40 mg; Inject 1 mL into the shoulder, thigh, or buttocks 1 (One) Time.  -     Ambulatory Referral to Physical Therapy Evaluate and treat    Other orders  -     traMADol (ULTRAM) 50 MG tablet; Take 1 tablet by mouth Every 8 (Eight) Hours As Needed for Moderate Pain .           Plan      Kenalog 40 mg IM.  Tramadol 50 mg q 8 hrs PRN.  Referral to PT.      Follow up in 4-6 weeks.          This document has been electronically signed by Swetha Rashid MD on January 25, 2018 9:59 PM

## 2018-02-08 RX ORDER — TRAMADOL HYDROCHLORIDE 50 MG/1
50 TABLET ORAL EVERY 8 HOURS PRN
Qty: 30 TABLET | Refills: 0 | Status: SHIPPED | OUTPATIENT
Start: 2018-02-08 | End: 2018-03-03 | Stop reason: ALTCHOICE

## 2018-02-08 RX ORDER — CYCLOBENZAPRINE HCL 10 MG
10 TABLET ORAL 3 TIMES DAILY PRN
Qty: 30 TABLET | Refills: 0 | Status: SHIPPED | OUTPATIENT
Start: 2018-02-08 | End: 2018-03-12 | Stop reason: SDUPTHER

## 2018-02-13 ENCOUNTER — OFFICE VISIT (OUTPATIENT)
Dept: INTERNAL MEDICINE | Facility: CLINIC | Age: 73
End: 2018-02-13

## 2018-02-13 VITALS
WEIGHT: 148.4 LBS | HEIGHT: 66 IN | SYSTOLIC BLOOD PRESSURE: 140 MMHG | BODY MASS INDEX: 23.85 KG/M2 | DIASTOLIC BLOOD PRESSURE: 85 MMHG

## 2018-02-13 DIAGNOSIS — M25.551 RIGHT HIP PAIN: Primary | ICD-10-CM

## 2018-02-13 DIAGNOSIS — M16.11 PRIMARY OSTEOARTHRITIS OF RIGHT HIP: ICD-10-CM

## 2018-02-13 PROCEDURE — 99213 OFFICE O/P EST LOW 20 MIN: CPT | Performed by: INTERNAL MEDICINE

## 2018-02-13 RX ORDER — HYDROCODONE BITARTRATE AND ACETAMINOPHEN 5; 325 MG/1; MG/1
1 TABLET ORAL EVERY 6 HOURS PRN
Qty: 20 TABLET | Refills: 0 | Status: SHIPPED | OUTPATIENT
Start: 2018-02-13 | End: 2018-02-19 | Stop reason: SDUPTHER

## 2018-02-13 RX ORDER — SODIUM PHOSPHATE,MONO-DIBASIC 19G-7G/118
500 ENEMA (ML) RECTAL EVERY MORNING
COMMUNITY
End: 2019-05-13

## 2018-02-13 NOTE — PROGRESS NOTES
Subjective   Brunilda Horner is a 72 y.o. female       Patient is in complaining of persistent pain in her right hip radiating down her leg.  She was given steroid injection last visit and also started on Tramadol abut still has a lot of pain. It is worse with walking.  X- Ray of L spine showed age related degenerative changes.  X-Ray of hips with degenerative changes and joint space narrowing, more pronounced on the right side.      Past Medical History:   Diagnosis Date   • Anxiety     Generalized anxiety disorder    12/30/2015    • Blepharitis 06/12/2014   • Cellulitis of foot 07/12/2016   • Depression    • History of Papanicolaou smear of cervix 06/03/2009    negative   • Pure hypercholesterolemia 12/30/2015     Past Surgical History:   Procedure Laterality Date   • BREAST BIOPSY     • BREAST SURGERY  05/31/1994    Excision of mass, left breast. Mass, left breast   • COLONOSCOPY  01/01/2010    no polyps   • FOOT SURGERY  05/08/2001    Removal of tumor, radical resection of soft tissue, right foot. Recurrent plantar fibroma, plantar, right foot   • MAMMO STEREOTACTIC BREAST BIOPSY 1ST W WO DEVICE  06/08/1988    Needle localization and excision of micro-calcifications. Micro-calcifications, left breast   • SKIN BIOPSY  06/06/1991    Tangential excision keratoses left cheek. Exicison 25 papilloma, neck.       Social History   Substance Use Topics   • Smoking status: Former Smoker     Packs/day: 1.00     Years: 10.00     Quit date: 1/1/1976   • Smokeless tobacco: Never Used   • Alcohol use No     Family History   Problem Relation Age of Onset   • Asthma Other    • Breast cancer Other    • Cancer Other    • Heart disease Other    • Breast cancer Paternal Aunt      No Known Allergies  Current Outpatient Prescriptions on File Prior to Visit   Medication Sig Dispense Refill   • atorvastatin (LIPITOR) 10 MG tablet Take 1 tablet by mouth Daily. 30 tablet 0   • buPROPion XL (WELLBUTRIN XL) 300 MG 24 hr tablet Take 1 tablet  by mouth Daily. 30 tablet 0   • cholecalciferol (VITAMIN D3) 1000 UNITS tablet Take 1,000 Units by mouth daily.     • COENZYME Q-10 PO Take  by mouth.     • cyclobenzaprine (FLEXERIL) 10 MG tablet Take 1 tablet by mouth 3 (Three) Times a Day As Needed for Muscle Spasms. 30 tablet 0   • Multiple Vitamins-Minerals (MULTIVITAMIN ADULT) tablet Take  by mouth.     • Omega-3 Fatty Acids (OMEGA-3 PO) Take  by mouth.     • SELENIUM PO Take  by mouth.     • temazepam (RESTORIL) 30 MG capsule Take 1 capsule by mouth At Night As Needed for Sleep. 30 capsule 0   • traMADol (ULTRAM) 50 MG tablet Take 1 tablet by mouth Every 8 (Eight) Hours As Needed for Moderate Pain . 30 tablet 0   • vitamin E 400 UNIT capsule Take 400 Units by mouth daily.       No current facility-administered medications on file prior to visit.      Review of Systems   Constitutional: Negative for chills, fatigue, fever and unexpected weight change.   Respiratory: Negative for chest tightness and shortness of breath.    Cardiovascular: Negative for chest pain, palpitations and leg swelling.   Gastrointestinal: Negative for abdominal pain, constipation and diarrhea.   Genitourinary: Negative for flank pain and frequency.   Musculoskeletal: Positive for back pain. Negative for gait problem, joint swelling and myalgias.        Positive for hip pain, worse on the right side   Neurological: Negative for dizziness, tremors, syncope, weakness and light-headedness.       Objective   Physical Exam   Constitutional: She appears well-developed and well-nourished.   HENT:   Nose: Nose normal.   Mouth/Throat: Oropharynx is clear and moist.   Eyes: Conjunctivae are normal.   Neck: No JVD present. No thyromegaly present.   Cardiovascular: Normal rate and regular rhythm.    Pulmonary/Chest: Effort normal and breath sounds normal.   Abdominal: Soft. Bowel sounds are normal.   Musculoskeletal:        Right hip: She exhibits tenderness.        Lumbar back: She exhibits  tenderness and spasm.   Neurological: She is alert. She has normal reflexes.   Skin: Skin is warm and dry.   Psychiatric: She has a normal mood and affect. Her behavior is normal.           Patient Active Problem List   Diagnosis   • Generalized anxiety disorder   • Chronic depression   • Traumatic hematoma of face   • Bilateral hip pain   • Bilateral primary osteoarthritis of hip   • Disorder of SI (sacroiliac) joint               Assessment/Plan   Brunilda was seen today for hip pain and leg pain.    Diagnoses and all orders for this visit:    Right hip pain  -     Ambulatory Referral to Orthopedic Surgery    Primary osteoarthritis of right hip    Other orders  -     HYDROcodone-acetaminophen (NORCO) 5-325 MG per tablet; Take 1 tablet by mouth Every 6 (Six) Hours As Needed for Moderate Pain .           Plan      Hydrocodone 5 mg q 6hrs PRN pain.   Flexeril 10 mg tid PRN.  Refer to ortho for further evaluation.    Discussed about habit forming nature of prescribed medication.  Robin reviewed and is consistent.      Follow up for routine care.          This document has been electronically signed by Swetha Rashid MD on February 16, 2018 1:14 PM

## 2018-02-16 ENCOUNTER — OFFICE VISIT (OUTPATIENT)
Dept: ORTHOPEDIC SURGERY | Facility: CLINIC | Age: 73
End: 2018-02-16

## 2018-02-16 VITALS — WEIGHT: 146 LBS | HEIGHT: 65 IN | BODY MASS INDEX: 24.32 KG/M2

## 2018-02-16 DIAGNOSIS — M53.3 DISORDER OF SI (SACROILIAC) JOINT: ICD-10-CM

## 2018-02-16 DIAGNOSIS — G89.29 CHRONIC PAIN OF RIGHT HIP: Primary | ICD-10-CM

## 2018-02-16 DIAGNOSIS — M16.11 PRIMARY OSTEOARTHRITIS OF RIGHT HIP: ICD-10-CM

## 2018-02-16 DIAGNOSIS — M25.551 CHRONIC PAIN OF RIGHT HIP: Primary | ICD-10-CM

## 2018-02-16 PROBLEM — M25.552 BILATERAL HIP PAIN: Status: ACTIVE | Noted: 2018-02-16

## 2018-02-16 PROBLEM — M16.0 BILATERAL PRIMARY OSTEOARTHRITIS OF HIP: Status: ACTIVE | Noted: 2018-02-16

## 2018-02-16 PROCEDURE — 96372 THER/PROPH/DIAG INJ SC/IM: CPT | Performed by: NURSE PRACTITIONER

## 2018-02-16 PROCEDURE — 99214 OFFICE O/P EST MOD 30 MIN: CPT | Performed by: NURSE PRACTITIONER

## 2018-02-16 RX ORDER — TRIAMCINOLONE ACETONIDE 40 MG/ML
80 INJECTION, SUSPENSION INTRA-ARTICULAR; INTRAMUSCULAR ONCE
Status: COMPLETED | OUTPATIENT
Start: 2018-02-16 | End: 2018-02-16

## 2018-02-16 RX ADMIN — TRIAMCINOLONE ACETONIDE 80 MG: 40 INJECTION, SUSPENSION INTRA-ARTICULAR; INTRAMUSCULAR at 12:11
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
## 2018-02-19 RX ORDER — ATORVASTATIN CALCIUM 10 MG/1
10 TABLET, FILM COATED ORAL DAILY
Qty: 30 TABLET | Refills: 2 | Status: SHIPPED | OUTPATIENT
Start: 2018-02-19 | End: 2018-05-22 | Stop reason: SDUPTHER

## 2018-02-19 RX ORDER — TEMAZEPAM 30 MG/1
30 CAPSULE ORAL NIGHTLY PRN
Qty: 30 CAPSULE | Refills: 0 | Status: SHIPPED | OUTPATIENT
Start: 2018-02-19 | End: 2018-02-19 | Stop reason: SDUPTHER

## 2018-02-19 RX ORDER — TEMAZEPAM 30 MG/1
30 CAPSULE ORAL NIGHTLY PRN
Qty: 30 CAPSULE | Refills: 0 | Status: SHIPPED | OUTPATIENT
Start: 2018-02-19 | End: 2018-03-19 | Stop reason: SDUPTHER

## 2018-02-19 RX ORDER — HYDROCODONE BITARTRATE AND ACETAMINOPHEN 5; 325 MG/1; MG/1
1 TABLET ORAL EVERY 6 HOURS PRN
Qty: 30 TABLET | Refills: 0 | Status: SHIPPED | OUTPATIENT
Start: 2018-02-19 | End: 2018-03-01 | Stop reason: SDUPTHER

## 2018-02-19 RX ORDER — BUPROPION HYDROCHLORIDE 300 MG/1
300 TABLET ORAL DAILY
Qty: 30 TABLET | Refills: 2 | Status: SHIPPED | OUTPATIENT
Start: 2018-02-19 | End: 2018-02-19 | Stop reason: SDUPTHER

## 2018-02-19 RX ORDER — BUPROPION HYDROCHLORIDE 300 MG/1
300 TABLET ORAL DAILY
Qty: 30 TABLET | Refills: 2 | Status: SHIPPED | OUTPATIENT
Start: 2018-02-19 | End: 2018-05-22 | Stop reason: SDUPTHER

## 2018-03-01 RX ORDER — HYDROCODONE BITARTRATE AND ACETAMINOPHEN 5; 325 MG/1; MG/1
1 TABLET ORAL EVERY 8 HOURS PRN
Qty: 15 TABLET | Refills: 0 | Status: SHIPPED | OUTPATIENT
Start: 2018-03-01 | End: 2018-04-10

## 2018-03-12 RX ORDER — CYCLOBENZAPRINE HCL 10 MG
10 TABLET ORAL 3 TIMES DAILY PRN
Qty: 30 TABLET | Refills: 0 | Status: SHIPPED | OUTPATIENT
Start: 2018-03-12 | End: 2018-04-10 | Stop reason: ALTCHOICE

## 2018-03-19 RX ORDER — TEMAZEPAM 30 MG/1
30 CAPSULE ORAL NIGHTLY PRN
Qty: 30 CAPSULE | Refills: 0 | Status: SHIPPED | OUTPATIENT
Start: 2018-03-19 | End: 2018-04-11 | Stop reason: SDUPTHER

## 2018-04-11 ENCOUNTER — APPOINTMENT (OUTPATIENT)
Dept: LAB | Facility: HOSPITAL | Age: 73
End: 2018-04-11

## 2018-04-11 ENCOUNTER — OFFICE VISIT (OUTPATIENT)
Dept: INTERNAL MEDICINE | Facility: CLINIC | Age: 73
End: 2018-04-11

## 2018-04-11 ENCOUNTER — TELEPHONE (OUTPATIENT)
Dept: INTERNAL MEDICINE | Facility: CLINIC | Age: 73
End: 2018-04-11

## 2018-04-11 VITALS
SYSTOLIC BLOOD PRESSURE: 150 MMHG | WEIGHT: 144.3 LBS | BODY MASS INDEX: 23.19 KG/M2 | HEIGHT: 66 IN | DIASTOLIC BLOOD PRESSURE: 80 MMHG

## 2018-04-11 DIAGNOSIS — S29.012D UPPER BACK STRAIN, SUBSEQUENT ENCOUNTER: Primary | ICD-10-CM

## 2018-04-11 DIAGNOSIS — Z79.899 LONG TERM USE OF DRUG: Primary | ICD-10-CM

## 2018-04-11 DIAGNOSIS — Z79.899 LONG-TERM USE OF HIGH-RISK MEDICATION: ICD-10-CM

## 2018-04-11 DIAGNOSIS — Z23 IMMUNIZATION DUE: ICD-10-CM

## 2018-04-11 DIAGNOSIS — R03.0 ELEVATED BLOOD-PRESSURE READING WITHOUT DIAGNOSIS OF HYPERTENSION: ICD-10-CM

## 2018-04-11 DIAGNOSIS — F32.A DEPRESSIVE DISORDER: ICD-10-CM

## 2018-04-11 DIAGNOSIS — N28.9 ABNORMAL RENAL FUNCTION: ICD-10-CM

## 2018-04-11 LAB
ALBUMIN SERPL-MCNC: 4.6 G/DL (ref 3.4–4.8)
AMPHET+METHAMPHET UR QL: NEGATIVE
ANION GAP SERPL CALCULATED.3IONS-SCNC: 14 MMOL/L (ref 5–15)
BACTERIA UR QL AUTO: ABNORMAL /HPF
BARBITURATES UR QL SCN: NEGATIVE
BENZODIAZ UR QL SCN: NEGATIVE
BILIRUB UR QL STRIP: NEGATIVE
BUN BLD-MCNC: 22 MG/DL (ref 7–21)
BUN/CREAT SERPL: 22.2 (ref 7–25)
CALCIUM SPEC-SCNC: 9.8 MG/DL (ref 8.4–10.2)
CANNABINOIDS SERPL QL: NEGATIVE
CHLORIDE SERPL-SCNC: 99 MMOL/L (ref 95–110)
CLARITY UR: CLEAR
CO2 SERPL-SCNC: 29 MMOL/L (ref 22–31)
COCAINE UR QL: NEGATIVE
COLOR UR: YELLOW
CREAT BLD-MCNC: 0.99 MG/DL (ref 0.5–1)
GFR SERPL CREATININE-BSD FRML MDRD: 55 ML/MIN/1.73 (ref 39–90)
GLUCOSE BLD-MCNC: 105 MG/DL (ref 60–100)
GLUCOSE UR STRIP-MCNC: NEGATIVE MG/DL
HGB UR QL STRIP.AUTO: NEGATIVE
HYALINE CASTS UR QL AUTO: ABNORMAL /LPF
KETONES UR QL STRIP: NEGATIVE
LEUKOCYTE ESTERASE UR QL STRIP.AUTO: ABNORMAL
METHADONE UR QL SCN: NEGATIVE
NITRITE UR QL STRIP: NEGATIVE
OPIATES UR QL: NEGATIVE
OXYCODONE UR QL SCN: NEGATIVE
PH UR STRIP.AUTO: 7.5 [PH] (ref 5–9)
PHOSPHATE SERPL-MCNC: 3.8 MG/DL (ref 2.4–4.4)
POTASSIUM BLD-SCNC: 5.3 MMOL/L (ref 3.5–5.1)
PROT UR QL STRIP: NEGATIVE
RBC # UR: ABNORMAL /HPF
REF LAB TEST METHOD: ABNORMAL
SODIUM BLD-SCNC: 142 MMOL/L (ref 137–145)
SP GR UR STRIP: 1.01 (ref 1–1.03)
SQUAMOUS #/AREA URNS HPF: ABNORMAL /HPF
UROBILINOGEN UR QL STRIP: ABNORMAL
WBC UR QL AUTO: ABNORMAL /HPF

## 2018-04-11 PROCEDURE — 80307 DRUG TEST PRSMV CHEM ANLYZR: CPT | Performed by: INTERNAL MEDICINE

## 2018-04-11 PROCEDURE — 90732 PPSV23 VACC 2 YRS+ SUBQ/IM: CPT | Performed by: INTERNAL MEDICINE

## 2018-04-11 PROCEDURE — 99214 OFFICE O/P EST MOD 30 MIN: CPT | Performed by: INTERNAL MEDICINE

## 2018-04-11 PROCEDURE — 36415 COLL VENOUS BLD VENIPUNCTURE: CPT | Performed by: INTERNAL MEDICINE

## 2018-04-11 PROCEDURE — G0009 ADMIN PNEUMOCOCCAL VACCINE: HCPCS | Performed by: INTERNAL MEDICINE

## 2018-04-11 PROCEDURE — G0480 DRUG TEST DEF 1-7 CLASSES: HCPCS | Performed by: INTERNAL MEDICINE

## 2018-04-11 PROCEDURE — 80069 RENAL FUNCTION PANEL: CPT | Performed by: INTERNAL MEDICINE

## 2018-04-11 PROCEDURE — 81001 URINALYSIS AUTO W/SCOPE: CPT | Performed by: INTERNAL MEDICINE

## 2018-04-11 RX ORDER — HYDROCODONE BITARTRATE AND ACETAMINOPHEN 7.5; 325 MG/1; MG/1
1 TABLET ORAL EVERY 6 HOURS PRN
Qty: 30 TABLET | Refills: 0 | Status: SHIPPED | OUTPATIENT
Start: 2018-04-11 | End: 2018-05-11 | Stop reason: SDUPTHER

## 2018-04-11 RX ORDER — TIZANIDINE 4 MG/1
4 TABLET ORAL NIGHTLY PRN
Qty: 30 TABLET | Refills: 0 | Status: SHIPPED | OUTPATIENT
Start: 2018-04-11 | End: 2018-05-29 | Stop reason: SDUPTHER

## 2018-04-11 RX ORDER — TEMAZEPAM 30 MG/1
30 CAPSULE ORAL NIGHTLY PRN
Qty: 30 CAPSULE | Refills: 2 | Status: SHIPPED | OUTPATIENT
Start: 2018-04-11 | End: 2018-07-16 | Stop reason: SDUPTHER

## 2018-04-11 NOTE — TELEPHONE ENCOUNTER
Spoke with pt let her know lab shows kidney function ok its back to normal also her potasium level was elevated she needs to follow a low potassium diet

## 2018-04-15 LAB — BENZODIAZ UR QL: POSITIVE

## 2018-05-11 RX ORDER — HYDROCODONE BITARTRATE AND ACETAMINOPHEN 7.5; 325 MG/1; MG/1
1 TABLET ORAL EVERY 6 HOURS PRN
Qty: 15 TABLET | Refills: 0 | Status: SHIPPED | OUTPATIENT
Start: 2018-05-11 | End: 2019-04-30 | Stop reason: SDUPTHER

## 2018-05-22 RX ORDER — ATORVASTATIN CALCIUM 10 MG/1
10 TABLET, FILM COATED ORAL DAILY
Qty: 30 TABLET | Refills: 2 | Status: SHIPPED | OUTPATIENT
Start: 2018-05-22 | End: 2018-08-21 | Stop reason: SDUPTHER

## 2018-05-22 RX ORDER — BUPROPION HYDROCHLORIDE 300 MG/1
300 TABLET ORAL DAILY
Qty: 30 TABLET | Refills: 2 | Status: SHIPPED | OUTPATIENT
Start: 2018-05-22 | End: 2018-08-27 | Stop reason: SDUPTHER

## 2018-05-29 ENCOUNTER — TELEPHONE (OUTPATIENT)
Dept: ORTHOPEDIC SURGERY | Facility: CLINIC | Age: 73
End: 2018-05-29

## 2018-05-29 RX ORDER — TIZANIDINE 4 MG/1
4 TABLET ORAL NIGHTLY PRN
Qty: 30 TABLET | Refills: 0 | Status: SHIPPED | OUTPATIENT
Start: 2018-05-29 | End: 2018-07-25 | Stop reason: SDUPTHER

## 2018-07-10 ENCOUNTER — OFFICE VISIT (OUTPATIENT)
Dept: ORTHOPEDIC SURGERY | Facility: CLINIC | Age: 73
End: 2018-07-10

## 2018-07-10 VITALS — BODY MASS INDEX: 22.66 KG/M2 | WEIGHT: 141 LBS | HEIGHT: 66 IN

## 2018-07-10 DIAGNOSIS — M53.3 DISORDER OF SI (SACROILIAC) JOINT: ICD-10-CM

## 2018-07-10 DIAGNOSIS — G89.29 CHRONIC PAIN OF RIGHT HIP: Primary | ICD-10-CM

## 2018-07-10 DIAGNOSIS — M16.11 PRIMARY OSTEOARTHRITIS OF RIGHT HIP: ICD-10-CM

## 2018-07-10 DIAGNOSIS — M25.551 CHRONIC PAIN OF RIGHT HIP: Primary | ICD-10-CM

## 2018-07-10 PROCEDURE — 96372 THER/PROPH/DIAG INJ SC/IM: CPT | Performed by: NURSE PRACTITIONER

## 2018-07-10 PROCEDURE — 99214 OFFICE O/P EST MOD 30 MIN: CPT | Performed by: NURSE PRACTITIONER

## 2018-07-10 RX ORDER — TRIAMCINOLONE ACETONIDE 40 MG/ML
80 INJECTION, SUSPENSION INTRA-ARTICULAR; INTRAMUSCULAR ONCE
Status: COMPLETED | OUTPATIENT
Start: 2018-07-10 | End: 2018-07-10

## 2018-07-10 RX ADMIN — TRIAMCINOLONE ACETONIDE 80 MG: 40 INJECTION, SUSPENSION INTRA-ARTICULAR; INTRAMUSCULAR at 15:55

## 2018-07-10 NOTE — PROGRESS NOTES
Brunilda Horner is a 73 y.o. female returns for     Chief Complaint   Patient presents with   • Right Hip - Follow-up, Pain       HISTORY OF PRESENT ILLNESS: Patient presents to office for follow up of chronic right hip pain. Patient was last seen in office on 2/16/2018 and given IM injection of Kenalog, which significantly improved her pain for several months. Recently her pain has slowly been returning. She is requesting a repeat IM injection today of steroid for pain.      CONCURRENT MEDICAL HISTORY:    Past Medical History:   Diagnosis Date   • Anxiety     Generalized anxiety disorder    12/30/2015    • Blepharitis 06/12/2014   • Cellulitis of foot 07/12/2016   • Depression    • History of Papanicolaou smear of cervix 06/03/2009    negative   • Pure hypercholesterolemia 12/30/2015       No Known Allergies      Current Outpatient Prescriptions:   •  atorvastatin (LIPITOR) 10 MG tablet, Take 1 tablet by mouth Daily., Disp: 30 tablet, Rfl: 2  •  buPROPion XL (WELLBUTRIN XL) 300 MG 24 hr tablet, Take 1 tablet by mouth Daily., Disp: 30 tablet, Rfl: 2  •  cholecalciferol (VITAMIN D3) 1000 UNITS tablet, Take 1,000 Units by mouth daily., Disp: , Rfl:   •  COENZYME Q-10 PO, Take  by mouth., Disp: , Rfl:   •  glucosamine sulfate 500 MG capsule capsule, Take 500 mg by mouth Every Morning., Disp: , Rfl:   •  HYDROcodone-acetaminophen (NORCO) 7.5-325 MG per tablet, Take 1 tablet by mouth Every 6 (Six) Hours As Needed for Moderate Pain ., Disp: 15 tablet, Rfl: 0  •  lidocaine viscous (XYLOCAINE) 2 % solution, , Disp: , Rfl:   •  Multiple Vitamins-Minerals (MULTIVITAMIN ADULT) tablet, Take  by mouth., Disp: , Rfl:   •  Omega-3 Fatty Acids (OMEGA-3 PO), Take  by mouth., Disp: , Rfl:   •  predniSONE (DELTASONE) 10 MG tablet, Take one tablet by mouth x 3 day then 0.5 tablet by mouth daily x 4 days., Disp: 5 tablet, Rfl: 0  •  temazepam (RESTORIL) 30 MG capsule, Take 1 capsule by mouth At Night As Needed for Sleep., Disp: 30  "capsule, Rfl: 2  •  tiZANidine (ZANAFLEX) 4 MG tablet, Take 1 tablet by mouth At Night As Needed for Muscle Spasms., Disp: 30 tablet, Rfl: 0  •  vitamin E 400 UNIT capsule, Take 400 Units by mouth daily., Disp: , Rfl:     Past Surgical History:   Procedure Laterality Date   • BREAST BIOPSY     • BREAST SURGERY  05/31/1994    Excision of mass, left breast. Mass, left breast   • COLONOSCOPY  01/01/2010    no polyps   • FOOT SURGERY  05/08/2001    Removal of tumor, radical resection of soft tissue, right foot. Recurrent plantar fibroma, plantar, right foot   • MAMMO STEREOTACTIC BREAST BIOPSY 1ST W WO DEVICE  06/08/1988    Needle localization and excision of micro-calcifications. Micro-calcifications, left breast   • SKIN BIOPSY  06/06/1991    Tangential excision keratoses left cheek. Exicison 25 papilloma, neck.       ROS  No fevers or chills.  No chest pain or shortness of air.  No GI or  disturbances. Right hip pain. Right leg pain.     PHYSICAL EXAMINATION:       Ht 167.6 cm (66\")   Wt 64 kg (141 lb)   LMP  (LMP Unknown)   BMI 22.76 kg/m²     Physical Exam   Constitutional: She is oriented to person, place, and time. Vital signs are normal. She appears well-developed and well-nourished. She is active and cooperative. She does not appear ill. No distress.   HENT:   Head: Normocephalic.   Pulmonary/Chest: Effort normal. No respiratory distress.   Abdominal: Soft. She exhibits no distension.   Musculoskeletal: She exhibits tenderness (Mild, right hip). She exhibits no edema or deformity.   Neurological: She is alert and oriented to person, place, and time. GCS eye subscore is 4. GCS verbal subscore is 5. GCS motor subscore is 6.   Skin: Skin is warm, dry and intact. Capillary refill takes less than 2 seconds. No erythema.   Psychiatric: She has a normal mood and affect. Her speech is normal and behavior is normal. Judgment and thought content normal. Cognition and memory are normal.   Vitals reviewed.      GAIT:  "    [x]  Normal  []  Antalgic    Assistive device: [x]  None  []  Walker     []  Crutches  []  Cane     []  Wheelchair  []  Stretcher    Right Hip Exam     Tenderness   The patient is experiencing tenderness in the lateral, anterior and posterior (Mild).    Range of Motion   Flexion: 120   Internal Rotation: 15   External Rotation: 40   Abduction: 40     Muscle Strength   The patient has normal right hip strength.    Tests   BROWN: positive  Fadir:  Positive FADIR test    Other   Erythema: absent  Sensation: normal  Pulse: present    Comments:  Mild pain and limitations with range of motion, especially rotation.       Left Hip Exam     Tenderness   The patient is experiencing no tenderness.         Range of Motion   The patient has normal left hip ROM.    Muscle Strength   The patient has normal left hip strength.     Tests   BROWN: negative  Fadir:  Negative FADIR test    Other   Erythema: absent  Sensation: normal  Pulse: present            Xr Spine Lumbar 2 Or 3 View     Result Date: 1/23/2018  Narrative: EXAM: Radiograph(s)   REGION:  Spine, lumbar    VIEWS:       3          INDICATION: back pain, M54.5 Low back pain COMPARISON:    none          FINDINGS: - Fracture(s):      none      - Alignment:      within normal limits        - Disc space heights: within normal limits for age        - Focal osteolytic/blastic: none       - Bone density:    grossly within normal limits for age  - misc.:  Moderate mid and inferior degenerative facet changes.        Impression: CONCLUSION:      1.  Age-related degenerative changes.             If pain or symptoms persist beyond reasonable expectations, suggest MRI as is deemed clinically appropriate.    Electronically signed by:  JUAN JOSÉ Wayne MD  1/23/2018 3:03 PM CST Workstation: [id number]     Xr Hips Bilateral W Or Wo Pelvis 2 View     Result Date: 1/23/2018  Narrative: EXAM:  Radiograph(s), Osseous       REGION:   Pelvis and bilateral hips  VIEWS:   3    INDICATION:     back pain, hip pain, M25.551 Pain in right hip M25.552 Pain in left hip   COMPARISON:    none FINDINGS:       No evidence of a fracture or bony malalignment.   No evidence of osteolytic/osteoblastic disease.   Age-related degenerative changes, with joint space narrowing, greater on the right.                        .         Impression: CONCLUSION:       1. Age-related degenerative changes, greater on the right.             Note:  if pain or symptoms persist beyond reasonable expectations and follow-up imaging is anticipated,  cross sectional imaging (MRI) is suggested, as is deemed clinically appropriate.               Electronically signed by:  JUAN JOSÉ Wayne MD  1/23/2018 3:05 PM [facility] Workstation: [id number]      ASSESSMENT:    Diagnoses and all orders for this visit:    Chronic pain of right hip  -     triamcinolone acetonide (KENALOG-40) injection 80 mg; Inject 2 mL into the shoulder, thigh, or buttocks 1 (One) Time.    Primary osteoarthritis of right hip  -     triamcinolone acetonide (KENALOG-40) injection 80 mg; Inject 2 mL into the shoulder, thigh, or buttocks 1 (One) Time.    Disorder of SI (sacroiliac) joint  -     triamcinolone acetonide (KENALOG-40) injection 80 mg; Inject 2 mL into the shoulder, thigh, or buttocks 1 (One) Time.    PLAN    Patient reports some increased right hip pain recently. The IM injection of Kenalog she received at last office visit on 2/16/2018 offered her significant relief for several months. Recommend repeat IM injection of Kenalog today for pain. Discussed intra-articular injection of right hip also for pain. For now, she declines and wants to do the IM injection since it helped so much before. The patient will consider intra-articular injection of right hip if her pain continues. Patient was previously unable to go to physical therapy due to the cost ($45 copay each visit), but is active and does exercising with the [employer] program. Recommend to progress activity as  tolerated with rest and activity modification during times of increased pain. She was previously taking Aleve, which caused her blood pressure to elevate and renal functions to elevate. Patient takes Norco occasionally for pain, prescribed by her primary care physician, Dr. Rashid. Patient also has Zanaflex as needed for pain/muscle spasms. Follow up as needed for any new, worsening or persistent symptoms. Plan for intra-articular injection of right hip if IM injection does not provide sufficient relief.     Return if symptoms worsen or fail to improve.      This document has been electronically signed by ARYA Sagastume on July 12, 2018 8:57 PM      ARYA Sagastume

## 2018-07-16 ENCOUNTER — APPOINTMENT (OUTPATIENT)
Dept: LAB | Facility: HOSPITAL | Age: 73
End: 2018-07-16

## 2018-07-16 ENCOUNTER — TELEPHONE (OUTPATIENT)
Dept: INTERNAL MEDICINE | Facility: CLINIC | Age: 73
End: 2018-07-16

## 2018-07-16 ENCOUNTER — OFFICE VISIT (OUTPATIENT)
Dept: INTERNAL MEDICINE | Facility: CLINIC | Age: 73
End: 2018-07-16

## 2018-07-16 VITALS
HEIGHT: 66 IN | SYSTOLIC BLOOD PRESSURE: 180 MMHG | BODY MASS INDEX: 22.48 KG/M2 | WEIGHT: 139.9 LBS | HEART RATE: 114 BPM | DIASTOLIC BLOOD PRESSURE: 80 MMHG | OXYGEN SATURATION: 96 %

## 2018-07-16 DIAGNOSIS — F41.1 GENERALIZED ANXIETY DISORDER: ICD-10-CM

## 2018-07-16 DIAGNOSIS — I10 ESSENTIAL HYPERTENSION: Primary | ICD-10-CM

## 2018-07-16 LAB
ALBUMIN SERPL-MCNC: 4.9 G/DL (ref 3.4–4.8)
ALBUMIN/GLOB SERPL: 1.4 G/DL (ref 1.1–1.8)
ALP SERPL-CCNC: 137 U/L (ref 38–126)
ALT SERPL W P-5'-P-CCNC: 28 U/L (ref 9–52)
ANION GAP SERPL CALCULATED.3IONS-SCNC: 8 MMOL/L (ref 5–15)
AST SERPL-CCNC: 53 U/L (ref 14–36)
BASOPHILS # BLD AUTO: 0.03 10*3/MM3 (ref 0–0.2)
BASOPHILS NFR BLD AUTO: 0.4 % (ref 0–2)
BILIRUB SERPL-MCNC: 0.4 MG/DL (ref 0.2–1.3)
BILIRUB UR QL STRIP: NEGATIVE
BUN BLD-MCNC: 18 MG/DL (ref 7–21)
BUN/CREAT SERPL: 19.8 (ref 7–25)
CALCIUM SPEC-SCNC: 10.2 MG/DL (ref 8.4–10.2)
CHLORIDE SERPL-SCNC: 98 MMOL/L (ref 95–110)
CLARITY UR: CLEAR
CO2 SERPL-SCNC: 29 MMOL/L (ref 22–31)
COLOR UR: YELLOW
CREAT BLD-MCNC: 0.91 MG/DL (ref 0.5–1)
DEPRECATED RDW RBC AUTO: 46.7 FL (ref 36.4–46.3)
EOSINOPHIL # BLD AUTO: 0.03 10*3/MM3 (ref 0–0.7)
EOSINOPHIL NFR BLD AUTO: 0.4 % (ref 0–7)
ERYTHROCYTE [DISTWIDTH] IN BLOOD BY AUTOMATED COUNT: 14 % (ref 11.5–14.5)
GFR SERPL CREATININE-BSD FRML MDRD: 61 ML/MIN/1.73 (ref 39–90)
GLOBULIN UR ELPH-MCNC: 3.4 GM/DL (ref 2.3–3.5)
GLUCOSE BLD-MCNC: 100 MG/DL (ref 60–100)
GLUCOSE UR STRIP-MCNC: NEGATIVE MG/DL
HCT VFR BLD AUTO: 42.3 % (ref 35–45)
HGB BLD-MCNC: 14.6 G/DL (ref 12–15.5)
HGB UR QL STRIP.AUTO: NEGATIVE
IMM GRANULOCYTES # BLD: 0.01 10*3/MM3 (ref 0–0.02)
IMM GRANULOCYTES NFR BLD: 0.1 % (ref 0–0.5)
KETONES UR QL STRIP: NEGATIVE
LEUKOCYTE ESTERASE UR QL STRIP.AUTO: NEGATIVE
LYMPHOCYTES # BLD AUTO: 1.62 10*3/MM3 (ref 0.6–4.2)
LYMPHOCYTES NFR BLD AUTO: 21.1 % (ref 10–50)
MCH RBC QN AUTO: 31.7 PG (ref 26.5–34)
MCHC RBC AUTO-ENTMCNC: 34.5 G/DL (ref 31.4–36)
MCV RBC AUTO: 91.8 FL (ref 80–98)
MONOCYTES # BLD AUTO: 0.91 10*3/MM3 (ref 0–0.9)
MONOCYTES NFR BLD AUTO: 11.8 % (ref 0–12)
NEUTROPHILS # BLD AUTO: 5.09 10*3/MM3 (ref 2–8.6)
NEUTROPHILS NFR BLD AUTO: 66.2 % (ref 37–80)
NITRITE UR QL STRIP: NEGATIVE
PH UR STRIP.AUTO: 7 [PH] (ref 5–9)
PLATELET # BLD AUTO: 320 10*3/MM3 (ref 150–450)
PMV BLD AUTO: 9.3 FL (ref 8–12)
POTASSIUM BLD-SCNC: 3.9 MMOL/L (ref 3.5–5.1)
PROT SERPL-MCNC: 8.3 G/DL (ref 6.3–8.6)
PROT UR QL STRIP: NEGATIVE
RBC # BLD AUTO: 4.61 10*6/MM3 (ref 3.77–5.16)
SODIUM BLD-SCNC: 135 MMOL/L (ref 137–145)
SP GR UR STRIP: 1.01 (ref 1–1.03)
TSH SERPL DL<=0.05 MIU/L-ACNC: 2.4 MIU/ML (ref 0.46–4.68)
UROBILINOGEN UR QL STRIP: NORMAL
WBC NRBC COR # BLD: 7.69 10*3/MM3 (ref 3.2–9.8)

## 2018-07-16 PROCEDURE — 84443 ASSAY THYROID STIM HORMONE: CPT | Performed by: INTERNAL MEDICINE

## 2018-07-16 PROCEDURE — 81003 URINALYSIS AUTO W/O SCOPE: CPT | Performed by: INTERNAL MEDICINE

## 2018-07-16 PROCEDURE — 85025 COMPLETE CBC W/AUTO DIFF WBC: CPT | Performed by: INTERNAL MEDICINE

## 2018-07-16 PROCEDURE — 36415 COLL VENOUS BLD VENIPUNCTURE: CPT | Performed by: INTERNAL MEDICINE

## 2018-07-16 PROCEDURE — 80053 COMPREHEN METABOLIC PANEL: CPT | Performed by: INTERNAL MEDICINE

## 2018-07-16 PROCEDURE — 99213 OFFICE O/P EST LOW 20 MIN: CPT | Performed by: INTERNAL MEDICINE

## 2018-07-16 RX ORDER — TEMAZEPAM 30 MG/1
30 CAPSULE ORAL NIGHTLY PRN
Qty: 30 CAPSULE | Refills: 2 | Status: SHIPPED | OUTPATIENT
Start: 2018-07-16 | End: 2018-10-17 | Stop reason: SDUPTHER

## 2018-07-16 RX ORDER — ESCITALOPRAM OXALATE 10 MG/1
5 TABLET ORAL DAILY
Qty: 90 TABLET | Refills: 0 | Status: SHIPPED | OUTPATIENT
Start: 2018-07-16 | End: 2018-08-31 | Stop reason: SDUPTHER

## 2018-07-16 RX ORDER — LISINOPRIL 10 MG/1
10 TABLET ORAL DAILY
Qty: 30 TABLET | Refills: 1 | Status: SHIPPED | OUTPATIENT
Start: 2018-07-16 | End: 2018-09-13 | Stop reason: SDUPTHER

## 2018-07-16 RX ORDER — CLONIDINE HYDROCHLORIDE 0.1 MG/1
0.1 TABLET ORAL EVERY 8 HOURS PRN
Qty: 30 TABLET | Refills: 0 | Status: SHIPPED | OUTPATIENT
Start: 2018-07-16 | End: 2018-10-17 | Stop reason: SDUPTHER

## 2018-07-16 NOTE — TELEPHONE ENCOUNTER
Pt called stating that last night her BP got up to 200/110, but today it is 160/80.  She wants to know if this is ok or does she need to do something different.  Please advise [phone].

## 2018-07-17 ENCOUNTER — TELEPHONE (OUTPATIENT)
Dept: INTERNAL MEDICINE | Facility: CLINIC | Age: 73
End: 2018-07-17

## 2018-07-23 ENCOUNTER — CLINICAL SUPPORT (OUTPATIENT)
Dept: INTERNAL MEDICINE | Facility: CLINIC | Age: 73
End: 2018-07-23

## 2018-07-23 VITALS — SYSTOLIC BLOOD PRESSURE: 140 MMHG | DIASTOLIC BLOOD PRESSURE: 80 MMHG

## 2018-07-23 DIAGNOSIS — Z01.30 BP CHECK: Primary | ICD-10-CM

## 2018-07-23 PROCEDURE — 99211 OFF/OP EST MAY X REQ PHY/QHP: CPT | Performed by: INTERNAL MEDICINE

## 2018-07-25 ENCOUNTER — TELEPHONE (OUTPATIENT)
Dept: FAMILY MEDICINE CLINIC | Facility: CLINIC | Age: 73
End: 2018-07-25

## 2018-07-25 RX ORDER — TIZANIDINE 4 MG/1
4 TABLET ORAL NIGHTLY PRN
Qty: 30 TABLET | Refills: 1 | Status: SHIPPED | OUTPATIENT
Start: 2018-07-25 | End: 2018-10-17 | Stop reason: SDUPTHER

## 2018-07-25 NOTE — TELEPHONE ENCOUNTER
Requested Refills Sent      ----- Message from Brunilda Horner sent at 7/25/2018 12:23 PM CDT -----  Regarding: Prescription Question  Contact: [phone]  Would you please refill my muscle relaxant, tizanidine? [facility]    Thank you  Jose L Horner  [birth year]  [phone]

## 2018-08-21 RX ORDER — ATORVASTATIN CALCIUM 10 MG/1
10 TABLET, FILM COATED ORAL DAILY
Qty: 30 TABLET | Refills: 2 | Status: SHIPPED | OUTPATIENT
Start: 2018-08-21 | End: 2018-10-17 | Stop reason: SDUPTHER

## 2018-08-27 RX ORDER — BUPROPION HYDROCHLORIDE 300 MG/1
300 TABLET ORAL DAILY
Qty: 30 TABLET | Refills: 2 | Status: SHIPPED | OUTPATIENT
Start: 2018-08-27 | End: 2018-10-17 | Stop reason: SDUPTHER

## 2018-08-31 ENCOUNTER — TELEPHONE (OUTPATIENT)
Dept: INTERNAL MEDICINE | Facility: CLINIC | Age: 73
End: 2018-08-31

## 2018-08-31 RX ORDER — ESCITALOPRAM OXALATE 10 MG/1
10 TABLET ORAL DAILY
Qty: 90 TABLET | Refills: 1 | Status: SHIPPED | OUTPATIENT
Start: 2018-08-31 | End: 2018-10-17 | Stop reason: SDUPTHER

## 2018-09-04 NOTE — PATIENT INSTRUCTIONS
"DASH Eating Plan  DASH stands for \"Dietary Approaches to Stop Hypertension.\" The DASH eating plan is a healthy eating plan that has been shown to reduce high blood pressure (hypertension). It may also reduce your risk for type 2 diabetes, heart disease, and stroke. The DASH eating plan may also help with weight loss.  What are tips for following this plan?  General guidelines  · Avoid eating more than 2,300 mg (milligrams) of salt (sodium) a day. If you have hypertension, you may need to reduce your sodium intake to 1,500 mg a day.  · Limit alcohol intake to no more than 1 drink a day for nonpregnant women and 2 drinks a day for men. One drink equals 12 oz of beer, 5 oz of wine, or 1½ oz of hard liquor.  · Work with your health care provider to maintain a healthy body weight or to lose weight. Ask what an ideal weight is for you.  · Get at least 30 minutes of exercise that causes your heart to beat faster (aerobic exercise) most days of the week. Activities may include walking, swimming, or biking.  · Work with your health care provider or diet and nutrition specialist (dietitian) to adjust your eating plan to your individual calorie needs.  Reading food labels  · Check food labels for the amount of sodium per serving. Choose foods with less than 5 percent of the Daily Value of sodium. Generally, foods with less than 300 mg of sodium per serving fit into this eating plan.  · To find whole grains, look for the word \"whole\" as the first word in the ingredient list.  Shopping  · Buy products labeled as \"low-sodium\" or \"no salt added.\"  · Buy fresh foods. Avoid canned foods and premade or frozen meals.  Cooking  · Avoid adding salt when cooking. Use salt-free seasonings or herbs instead of table salt or sea salt. Check with your health care provider or pharmacist before using salt substitutes.  · Do not javed foods. Cook foods using healthy methods such as baking, boiling, grilling, and broiling instead.  · Cook with " heart-healthy oils, such as olive, canola, soybean, or sunflower oil.  Meal planning    · Eat a balanced diet that includes:  ? 5 or more servings of fruits and vegetables each day. At each meal, try to fill half of your plate with fruits and vegetables.  ? Up to 6-8 servings of whole grains each day.  ? Less than 6 oz of lean meat, poultry, or fish each day. A 3-oz serving of meat is about the same size as a deck of cards. One egg equals 1 oz.  ? 2 servings of low-fat dairy each day.  ? A serving of nuts, seeds, or beans 5 times each week.  ? Heart-healthy fats. Healthy fats called Omega-3 fatty acids are found in foods such as flaxseeds and coldwater fish, like sardines, salmon, and mackerel.  · Limit how much you eat of the following:  ? Canned or prepackaged foods.  ? Food that is high in trans fat, such as fried foods.  ? Food that is high in saturated fat, such as fatty meat.  ? Sweets, desserts, sugary drinks, and other foods with added sugar.  ? Full-fat dairy products.  · Do not salt foods before eating.  · Try to eat at least 2 vegetarian meals each week.  · Eat more home-cooked food and less restaurant, buffet, and fast food.  · When eating at a restaurant, ask that your food be prepared with less salt or no salt, if possible.  What foods are recommended?  The items listed may not be a complete list. Talk with your dietitian about what dietary choices are best for you.  Grains  Whole-grain or whole-wheat bread. Whole-grain or whole-wheat pasta. Brown rice. Oatmeal. Quinoa. Bulgur. Whole-grain and low-sodium cereals. Yazmin bread. Low-fat, low-sodium crackers. Whole-wheat flour tortillas.  Vegetables  Fresh or frozen vegetables (raw, steamed, roasted, or grilled). Low-sodium or reduced-sodium tomato and vegetable juice. Low-sodium or reduced-sodium tomato sauce and tomato paste. Low-sodium or reduced-sodium canned vegetables.  Fruits  All fresh, dried, or frozen fruit. Canned fruit in natural juice (without  added sugar).  Meat and other protein foods  Skinless chicken or turkey. Ground chicken or turkey. Pork with fat trimmed off. Fish and seafood. Egg whites. Dried beans, peas, or lentils. Unsalted nuts, nut butters, and seeds. Unsalted canned beans. Lean cuts of beef with fat trimmed off. Low-sodium, lean deli meat.  Dairy  Low-fat (1%) or fat-free (skim) milk. Fat-free, low-fat, or reduced-fat cheeses. Nonfat, low-sodium ricotta or cottage cheese. Low-fat or nonfat yogurt. Low-fat, low-sodium cheese.  Fats and oils  Soft margarine without trans fats. Vegetable oil. Low-fat, reduced-fat, or light mayonnaise and salad dressings (reduced-sodium). Canola, safflower, olive, soybean, and sunflower oils. Avocado.  Seasoning and other foods  Herbs. Spices. Seasoning mixes without salt. Unsalted popcorn and pretzels. Fat-free sweets.  What foods are not recommended?  The items listed may not be a complete list. Talk with your dietitian about what dietary choices are best for you.  Grains  Baked goods made with fat, such as croissants, muffins, or some breads. Dry pasta or rice meal packs.  Vegetables  Creamed or fried vegetables. Vegetables in a cheese sauce. Regular canned vegetables (not low-sodium or reduced-sodium). Regular canned tomato sauce and paste (not low-sodium or reduced-sodium). Regular tomato and vegetable juice (not low-sodium or reduced-sodium). Pickles. Olives.  Fruits  Canned fruit in a light or heavy syrup. Fried fruit. Fruit in cream or butter sauce.  Meat and other protein foods  Fatty cuts of meat. Ribs. Fried meat. Bocanegra. Sausage. Bologna and other processed lunch meats. Salami. Fatback. Hotdogs. Bratwurst. Salted nuts and seeds. Canned beans with added salt. Canned or smoked fish. Whole eggs or egg yolks. Chicken or turkey with skin.  Dairy  Whole or 2% milk, cream, and half-and-half. Whole or full-fat cream cheese. Whole-fat or sweetened yogurt. Full-fat cheese. Nondairy creamers. Whipped toppings.  Processed cheese and cheese spreads.  Fats and oils  Butter. Stick margarine. Lard. Shortening. Ghee. Bocanegra fat. Tropical oils, such as coconut, palm kernel, or palm oil.  Seasoning and other foods  Salted popcorn and pretzels. Onion salt, garlic salt, seasoned salt, table salt, and sea salt. Worcestershire sauce. Tartar sauce. Barbecue sauce. Teriyaki sauce. Soy sauce, including reduced-sodium. Steak sauce. Canned and packaged gravies. Fish sauce. Oyster sauce. Cocktail sauce. Horseradish that you find on the shelf. Ketchup. Mustard. Meat flavorings and tenderizers. Bouillon cubes. Hot sauce and Tabasco sauce. Premade or packaged marinades. Premade or packaged taco seasonings. Relishes. Regular salad dressings.  Where to find more information:  · National Heart, Lung, and Blood Atkins: www.nhlbi.nih.gov  · American Heart Association: www.heart.org  Summary  · The DASH eating plan is a healthy eating plan that has been shown to reduce high blood pressure (hypertension). It may also reduce your risk for type 2 diabetes, heart disease, and stroke.  · With the DASH eating plan, you should limit salt (sodium) intake to 2,300 mg a day. If you have hypertension, you may need to reduce your sodium intake to 1,500 mg a day.  · When on the DASH eating plan, aim to eat more fresh fruits and vegetables, whole grains, lean proteins, low-fat dairy, and heart-healthy fats.  · Work with your health care provider or diet and nutrition specialist (dietitian) to adjust your eating plan to your individual calorie needs.  This information is not intended to replace advice given to you by your health care provider. Make sure you discuss any questions you have with your health care provider.  Document Released: 12/06/2012 Document Revised: 12/11/2017 Document Reviewed: 12/11/2017  [employer] Patient Education © 2018 [employer]

## 2018-09-12 ENCOUNTER — OFFICE VISIT (OUTPATIENT)
Dept: ORTHOPEDIC SURGERY | Facility: CLINIC | Age: 73
End: 2018-09-12

## 2018-09-12 VITALS — HEIGHT: 65 IN | BODY MASS INDEX: 23.32 KG/M2 | WEIGHT: 140 LBS

## 2018-09-12 DIAGNOSIS — M53.3 DISORDER OF SI (SACROILIAC) JOINT: ICD-10-CM

## 2018-09-12 DIAGNOSIS — G89.29 CHRONIC PAIN OF RIGHT HIP: ICD-10-CM

## 2018-09-12 DIAGNOSIS — M25.551 CHRONIC PAIN OF RIGHT HIP: ICD-10-CM

## 2018-09-12 DIAGNOSIS — M16.11 PRIMARY OSTEOARTHRITIS OF RIGHT HIP: Primary | ICD-10-CM

## 2018-09-12 PROCEDURE — 99213 OFFICE O/P EST LOW 20 MIN: CPT | Performed by: NURSE PRACTITIONER

## 2018-09-12 PROCEDURE — 96372 THER/PROPH/DIAG INJ SC/IM: CPT | Performed by: NURSE PRACTITIONER

## 2018-09-12 RX ORDER — TRIAMCINOLONE ACETONIDE 40 MG/ML
80 INJECTION, SUSPENSION INTRA-ARTICULAR; INTRAMUSCULAR ONCE
Status: COMPLETED | OUTPATIENT
Start: 2018-09-12 | End: 2018-09-12

## 2018-09-12 RX ADMIN — TRIAMCINOLONE ACETONIDE 80 MG: 40 INJECTION, SUSPENSION INTRA-ARTICULAR; INTRAMUSCULAR at 10:19

## 2018-09-12 NOTE — PROGRESS NOTES
"Brunilda Horner is a 73 y.o. female returns for     Chief Complaint   Patient presents with   • Right Hip - Follow-up, Pain     lHISTORY OF PRESENT ILLNESS: Patient presents to office for follow up of chronic right hip pain. Patient was last seen in office on 7/10/2018 and given IM Kenalog, which improved her pain. Pain is gradually increasing. Current pain level 4/10. Patient is requesting an evaluation for repeat IM injection today.     CONCURRENT MEDICAL HISTORY:    The following portions of the patient's history were reviewed and updated as appropriate: allergies, current medications, past family history, past medical history, past social history, past surgical history and problem list.     ROS  No fevers or chills.  No chest pain or shortness of air.  No GI or  disturbances. Right hip pain.     PHYSICAL EXAMINATION:       Ht 165.1 cm (65\")   Wt 63.5 kg (140 lb)   LMP  (LMP Unknown)   BMI 23.30 kg/m²     Physical Exam   Constitutional: She is oriented to person, place, and time. Vital signs are normal. She appears well-developed and well-nourished. She is active and cooperative. She does not appear ill. No distress.   HENT:   Head: Normocephalic.   Pulmonary/Chest: Effort normal. No respiratory distress.   Abdominal: Soft. She exhibits no distension.   Musculoskeletal: She exhibits tenderness (Mild, right hip, right SI joint). She exhibits no edema or deformity.   Neurological: She is alert and oriented to person, place, and time. GCS eye subscore is 4. GCS verbal subscore is 5. GCS motor subscore is 6.   Skin: Skin is warm, dry and intact. Capillary refill takes less than 2 seconds. No erythema.   Psychiatric: She has a normal mood and affect. Her speech is normal and behavior is normal. Judgment and thought content normal. Cognition and memory are normal.   Vitals reviewed.      GAIT:     [x]  Normal  []  Antalgic    Assistive device: [x]  None  []  Walker     []  Crutches  []  Cane     []  Wheelchair  [] "  Stretcher    Right Hip Exam     Tenderness   Right hip tenderness location: Mild, diffuse.    Range of Motion   Flexion: 120   Internal Rotation: 15   External Rotation: 40   Abduction: 40     Muscle Strength   The patient has normal right hip strength.    Tests   BROWN: positive  Fadir:  Positive FADIR test    Other   Erythema: absent  Sensation: normal  Pulse: present    Comments:  Mild pain and limitations with range of motion, especially rotation. Stable joint exam.       Left Hip Exam     Tenderness   The patient is experiencing no tenderness.         Range of Motion   The patient has normal left hip ROM.    Muscle Strength   The patient has normal left hip strength.     Tests   BROWN: negative  Fadir:  Negative FADIR test    Other   Erythema: absent  Sensation: normal  Pulse: present      Back Exam     Tenderness   The patient is experiencing tenderness in the sacroiliac (Right).    Range of Motion   The patient has normal back ROM.    Muscle Strength   The patient has normal back strength.    Reflexes   Patellar: normal    Other   Sensation: normal  Gait: normal   Erythema: no back redness  Scars: absent    Comments:  Mild tenderness to right SI joint. No focal neurological deficits noted.              Xr Spine Lumbar 2 Or 3 View     Result Date: 1/23/2018  Narrative: EXAM: Radiograph(s)   REGION:  Spine, lumbar    VIEWS:       3          INDICATION: back pain, M54.5 Low back pain COMPARISON:    none          FINDINGS: - Fracture(s):      none      - Alignment:      within normal limits        - Disc space heights: within normal limits for age        - Focal osteolytic/blastic: none       - Bone density:    grossly within normal limits for age  - misc.:  Moderate mid and inferior degenerative facet changes.        Impression: CONCLUSION:      1.  Age-related degenerative changes.             If pain or symptoms persist beyond reasonable expectations, suggest MRI as is deemed clinically  appropriate.     Electronically signed by:  JUAN JOSÉ Wayne MD  1/23/2018 3:03 PM CST Workstation: [id number]     Xr Hips Bilateral W Or Wo Pelvis 2 View     Result Date: 1/23/2018  Narrative: EXAM:  Radiograph(s), Osseous       REGION:   Pelvis and bilateral hips  VIEWS:   3    INDICATION:    back pain, hip pain, M25.551 Pain in right hip M25.552 Pain in left hip   COMPARISON:    none FINDINGS:       No evidence of a fracture or bony malalignment.   No evidence of osteolytic/osteoblastic disease.   Age-related degenerative changes, with joint space narrowing, greater on the right.                        .         Impression: CONCLUSION:       1. Age-related degenerative changes, greater on the right.              Note:  if pain or symptoms persist beyond reasonable expectations and follow-up imaging is anticipated,  cross sectional imaging (MRI) is suggested, as is deemed clinically appropriate.                Electronically signed by:  JUAN JOSÉ Wayne MD  1/23/2018 3:05 PM CST Workstation: [id number]      ASSESSMENT:    Diagnoses and all orders for this visit:    Primary osteoarthritis of right hip  -     triamcinolone acetonide (KENALOG-40) injection 80 mg; Inject 2 mL into the appropriate muscle as directed by prescriber 1 (One) Time.    Disorder of SI (sacroiliac) joint    Chronic pain of right hip    PLAN    Patient complains of continued chronic right hip pain that has increased recently.  Recommend repeat IM injection of Kenalog today for management of pain.  Again, discussed and offered to order an intra-articular injection of the right hip for improved pain control.  Patient declines and states that the IM injections help her for now.  Recommend to progress activity as tolerated with rest and activity modification during times of increased pain.  Patient was previously unable to attend physical therapy due to the cost ($45 copay each visit), but she remains active and exercises through the [employer]  program.  Patient is unable to take oral NSAIDs due to previous side effects of elevating her blood pressure and renal functions.  Patient takes Norco occasionally for pain, prescribed by her primary care provider.  Patient also has Zanaflex as needed for pain/muscle spasms.  Follow-up as needed for any new or worsening symptoms or any concerns.     Return if symptoms worsen or fail to improve.      This document has been electronically signed by ARYA Sagastume on September 17, 2018 1:23 PM      ARYA Sagastume

## 2018-09-13 RX ORDER — LISINOPRIL 10 MG/1
10 TABLET ORAL DAILY
Qty: 90 TABLET | Refills: 1 | Status: SHIPPED | OUTPATIENT
Start: 2018-09-13 | End: 2019-02-28 | Stop reason: SDUPTHER

## 2018-10-17 ENCOUNTER — OFFICE VISIT (OUTPATIENT)
Dept: FAMILY MEDICINE CLINIC | Facility: CLINIC | Age: 73
End: 2018-10-17

## 2018-10-17 VITALS
WEIGHT: 143.3 LBS | HEIGHT: 65 IN | SYSTOLIC BLOOD PRESSURE: 132 MMHG | BODY MASS INDEX: 23.88 KG/M2 | DIASTOLIC BLOOD PRESSURE: 80 MMHG

## 2018-10-17 DIAGNOSIS — Z12.31 VISIT FOR SCREENING MAMMOGRAM: ICD-10-CM

## 2018-10-17 DIAGNOSIS — F32.A CHRONIC DEPRESSION: Chronic | ICD-10-CM

## 2018-10-17 DIAGNOSIS — M16.0 BILATERAL PRIMARY OSTEOARTHRITIS OF HIP: Primary | Chronic | ICD-10-CM

## 2018-10-17 DIAGNOSIS — F41.1 GENERALIZED ANXIETY DISORDER: Chronic | ICD-10-CM

## 2018-10-17 DIAGNOSIS — E78.49 OTHER HYPERLIPIDEMIA: Chronic | ICD-10-CM

## 2018-10-17 PROBLEM — M25.551 CHRONIC PAIN OF RIGHT HIP: Chronic | Status: ACTIVE | Noted: 2018-02-16

## 2018-10-17 PROBLEM — M16.11 PRIMARY OSTEOARTHRITIS OF RIGHT HIP: Status: RESOLVED | Noted: 2018-07-10 | Resolved: 2018-10-17

## 2018-10-17 PROBLEM — G89.29 CHRONIC PAIN OF RIGHT HIP: Chronic | Status: ACTIVE | Noted: 2018-02-16

## 2018-10-17 PROBLEM — M53.3 DISORDER OF SI (SACROILIAC) JOINT: Status: RESOLVED | Noted: 2018-02-16 | Resolved: 2018-10-17

## 2018-10-17 PROCEDURE — 99214 OFFICE O/P EST MOD 30 MIN: CPT | Performed by: FAMILY MEDICINE

## 2018-10-17 RX ORDER — ATORVASTATIN CALCIUM 10 MG/1
10 TABLET, FILM COATED ORAL DAILY
Qty: 90 TABLET | Refills: 3 | Status: SHIPPED | OUTPATIENT
Start: 2018-10-17 | End: 2018-11-20 | Stop reason: SDUPTHER

## 2018-10-17 RX ORDER — BUPROPION HYDROCHLORIDE 300 MG/1
300 TABLET ORAL DAILY
Qty: 90 TABLET | Refills: 1 | Status: SHIPPED | OUTPATIENT
Start: 2018-10-17 | End: 2018-11-20 | Stop reason: SDUPTHER

## 2018-10-17 RX ORDER — TEMAZEPAM 30 MG/1
30 CAPSULE ORAL NIGHTLY PRN
Qty: 30 CAPSULE | Refills: 5 | Status: SHIPPED | OUTPATIENT
Start: 2018-10-17 | End: 2019-04-16 | Stop reason: SDUPTHER

## 2018-10-17 RX ORDER — CLONIDINE HYDROCHLORIDE 0.1 MG/1
0.1 TABLET ORAL EVERY 8 HOURS PRN
Qty: 30 TABLET | Refills: 0 | Status: SHIPPED | OUTPATIENT
Start: 2018-10-17 | End: 2019-04-30

## 2018-10-17 RX ORDER — TIZANIDINE 4 MG/1
4 TABLET ORAL NIGHTLY PRN
Qty: 30 TABLET | Refills: 5 | Status: SHIPPED | OUTPATIENT
Start: 2018-10-17 | End: 2019-05-06 | Stop reason: SDUPTHER

## 2018-10-17 RX ORDER — ESCITALOPRAM OXALATE 10 MG/1
10 TABLET ORAL DAILY
Qty: 90 TABLET | Refills: 1 | Status: SHIPPED | OUTPATIENT
Start: 2018-10-17 | End: 2019-02-28 | Stop reason: SDUPTHER

## 2018-10-17 NOTE — PROGRESS NOTES
Subjective   Brunilda Horner is a 73 y.o. female.     History of Present Illness   follow-up internal medicine diagnoses bilateral osteoarthritis of the hips chronic mild depressive disorder with insomnia.  Mild hyperlipidemia.  Is up-to-date on all except mammogram.  We are out of flu vaccine will get in the community.  Overall feels well.  Seeing orthopedics about the hips.  Medicines are reviewed.    The following portions of the patient's history were reviewed and updated as appropriate: allergies, current medications, past family history, past medical history, past social history, past surgical history and problem list.    Review of Systems   Constitutional: Negative for activity change, appetite change, fatigue and unexpected weight change.   HENT: Negative for trouble swallowing and voice change.    Eyes: Negative for redness and visual disturbance.   Respiratory: Negative for cough and wheezing.    Cardiovascular: Negative for chest pain and palpitations.   Gastrointestinal: Negative for abdominal pain, constipation, diarrhea, nausea and vomiting.   Genitourinary: Negative for urgency.   Musculoskeletal: Positive for arthralgias. Negative for joint swelling.   Neurological: Negative for syncope and headaches.   Hematological: Negative for adenopathy.   Psychiatric/Behavioral: Negative for sleep disturbance.       Objective   Physical Exam   Constitutional: She is oriented to person, place, and time. She appears well-developed.   HENT:   Head: Normocephalic.   Nose: Nose normal.   Eyes: Pupils are equal, round, and reactive to light.   Neck: Normal range of motion. No thyromegaly present.   Cardiovascular: Normal rate, regular rhythm, normal heart sounds and intact distal pulses.  Exam reveals no gallop and no friction rub.    No murmur heard.  Pulmonary/Chest: Breath sounds normal.   Abdominal: Soft. She exhibits no distension and no mass. There is no tenderness.   Musculoskeletal: Normal range of motion.  She exhibits tenderness (Get up and go approximately 3-4 seconds gait normal).   Neurological: She is alert and oriented to person, place, and time. She has normal reflexes.   Skin: Skin is warm and dry.   Psychiatric: She has a normal mood and affect. Her behavior is normal. Judgment and thought content normal.       Assessment/Plan   Brunilda was seen today for establish care.    Diagnoses and all orders for this visit:    Bilateral primary osteoarthritis of hip  -     tiZANidine (ZANAFLEX) 4 MG tablet; Take 1 tablet by mouth At Night As Needed for Muscle Spasms.    Visit for screening mammogram  -     Mammo Screening Digital Tomosynthesis Bilateral With CAD    Chronic depression  -     buPROPion XL (WELLBUTRIN XL) 300 MG 24 hr tablet; Take 1 tablet by mouth Daily.  -     escitalopram (LEXAPRO) 10 MG tablet; Take 1 tablet by mouth Daily.  -     temazepam (RESTORIL) 30 MG capsule; Take 1 capsule by mouth At Night As Needed for Sleep.    Generalized anxiety disorder    Other hyperlipidemia  -     atorvastatin (LIPITOR) 10 MG tablet; Take 1 tablet by mouth Daily.    Other orders  -     CloNIDine (CATAPRES) 0.1 MG tablet; Take 1 tablet by mouth Every 8 (Eight) Hours As Needed for High Blood Pressure.       lifestyle measures fall prevention hydration routine measures.  Recheck 6 months

## 2018-11-19 ENCOUNTER — TELEPHONE (OUTPATIENT)
Dept: FAMILY MEDICINE CLINIC | Facility: CLINIC | Age: 73
End: 2018-11-19

## 2018-11-19 NOTE — TELEPHONE ENCOUNTER
AMOS BLANDON IS NEEDING REFILL ON THE ATORVASTATIN AND BUPROPION TO BE SENT TO [facility]  HER# [phone]

## 2018-11-20 ENCOUNTER — TELEPHONE (OUTPATIENT)
Dept: FAMILY MEDICINE CLINIC | Facility: CLINIC | Age: 73
End: 2018-11-20

## 2018-11-20 DIAGNOSIS — E78.49 OTHER HYPERLIPIDEMIA: Chronic | ICD-10-CM

## 2018-11-20 DIAGNOSIS — F32.A CHRONIC DEPRESSION: Chronic | ICD-10-CM

## 2018-11-20 RX ORDER — BUPROPION HYDROCHLORIDE 300 MG/1
300 TABLET ORAL DAILY
Qty: 90 TABLET | Refills: 1 | Status: SHIPPED | OUTPATIENT
Start: 2018-11-20 | End: 2019-05-13 | Stop reason: SDUPTHER

## 2018-11-20 RX ORDER — ATORVASTATIN CALCIUM 10 MG/1
10 TABLET, FILM COATED ORAL DAILY
Qty: 90 TABLET | Refills: 3 | Status: SHIPPED | OUTPATIENT
Start: 2018-11-20 | End: 2019-05-15

## 2018-12-07 ENCOUNTER — OFFICE VISIT (OUTPATIENT)
Dept: ORTHOPEDIC SURGERY | Facility: CLINIC | Age: 73
End: 2018-12-07

## 2018-12-07 VITALS — HEIGHT: 65 IN | BODY MASS INDEX: 23.82 KG/M2 | WEIGHT: 143 LBS

## 2018-12-07 DIAGNOSIS — G89.29 CHRONIC PAIN OF RIGHT HIP: ICD-10-CM

## 2018-12-07 DIAGNOSIS — M16.11 PRIMARY OSTEOARTHRITIS OF RIGHT HIP: Primary | ICD-10-CM

## 2018-12-07 DIAGNOSIS — I10 ESSENTIAL HYPERTENSION: ICD-10-CM

## 2018-12-07 DIAGNOSIS — M25.551 CHRONIC PAIN OF RIGHT HIP: ICD-10-CM

## 2018-12-07 DIAGNOSIS — M53.3 DISORDER OF SI (SACROILIAC) JOINT: ICD-10-CM

## 2018-12-07 PROCEDURE — 99213 OFFICE O/P EST LOW 20 MIN: CPT | Performed by: ORTHOPAEDIC SURGERY

## 2018-12-07 NOTE — PROGRESS NOTES
"Brunilda Horner is a 73 y.o. female returns for     Chief Complaint   Patient presents with   • Right Hip - Follow-up       HISTORY OF PRESENT ILLNESS: f/u on right hip per Dena Carter, patient states that she has pain in her right hip and states that she has steroid injections and  Wants to know her next options maybe hip replacement,     Having pain most of the time.  Groin and lateral pain.  Worse with activity.  Tried using a cane without improvement  Pain with ADL's.  Limited in time of activity.  Mostly dull aching but also has sharp stabbing pains.  Unhappy with quality of life.  Has been to PT and now doing HEP.       CONCURRENT MEDICAL HISTORY:    The following portions of the patient's history were reviewed and updated as appropriate: allergies, current medications, past family history, past medical history, past social history, past surgical history and problem list.     ROS  No fevers or chills.  No chest pain or shortness of air.  No GI or  disturbances.    PHYSICAL EXAMINATION:       Ht 165.1 cm (65\")   Wt 64.9 kg (143 lb)   LMP  (LMP Unknown)   BMI 23.80 kg/m²     Physical Exam   Constitutional: She is oriented to person, place, and time. She appears well-developed and well-nourished.   Neurological: She is alert and oriented to person, place, and time.   Psychiatric: She has a normal mood and affect. Her behavior is normal. Judgment and thought content normal.       GAIT:     []  Normal  [x]  Antalgic    Assistive device: [x]  None  []  Walker     []  Crutches  []  Cane     []  Wheelchair  []  Stretcher    Right Hip Exam     Tenderness   The patient is experiencing tenderness in the greater trochanter.    Range of Motion   Flexion: 110   External rotation: 20   Internal rotation: 5     Muscle Strength   Flexion: 4/5     Tests   BROWN: positive  Fadir:  Positive FADIR test    Other   Erythema: absent  Sensation: normal  Pulse: present      Left Hip Exam     Tenderness   The patient is " experiencing no tenderness.     Range of Motion   The patient has normal left hip ROM.    Muscle Strength   The patient has normal left hip strength.     Tests   BROWN: negative    Other   Erythema: absent  Sensation: normal  Pulse: present                Study Result        EXAM:  Radiograph(s), Osseous         REGION:   Pelvis and bilateral hips    VIEWS:   3             INDICATION:    back pain, hip pain, M25.551 Pain in right hip  M25.552 Pain in left hip     COMPARISON:    none              FINDINGS:           No evidence of a fracture or bony malalignment.     No evidence of osteolytic/osteoblastic disease.     Age-related degenerative changes, with joint space narrowing,  greater on the right..                             .      IMPRESSION:  CONCLUSION:           1. Age-related degenerative changes, greater on the right.              Note:  if pain or symptoms persist beyond reasonable expectations  and follow-up imaging is anticipated,  cross sectional imaging  (MRI) is suggested, as is deemed clinically appropriate.                         Electronically signed by:  JUAN JOSÉ Wayne MD  1/23/2018 3:05  PM [facility] Workstation: [id number]             ASSESSMENT:    Diagnoses and all orders for this visit:    Primary osteoarthritis of right hip    Disorder of SI (sacroiliac) joint    Chronic pain of right hip    Essential hypertension          PLAN    Activity as tolerated.  KRISTIN on right is imminent  Currently has some social and family obligations  Began discussion for surgery  Gave the joint replacment book  Recheck in 6 weeks.    Patient's Body mass index is 23.8 kg/m². BMI is within normal parameters. No follow-up required.      Return in about 6 weeks (around 1/18/2019) for recheck.    Shady Canales MD

## 2019-01-17 DIAGNOSIS — M16.11 PRIMARY OSTEOARTHRITIS OF RIGHT HIP: Primary | ICD-10-CM

## 2019-01-18 ENCOUNTER — TELEPHONE (OUTPATIENT)
Dept: ORTHOPEDIC SURGERY | Facility: CLINIC | Age: 74
End: 2019-01-18

## 2019-01-18 NOTE — TELEPHONE ENCOUNTER
Regarding: FW: Visit Follow-Up Question  Contact: [phone]      ----- Message -----  From: Brunilda Horner  Sent: 1/18/2019   6:08 AM  To: [facility]  Subject: Visit Follow-Up Question                         ----- Message from Mychart, Generic sent at 1/18/2019  7:08 AM EST -----    I woke up early today sick and am unable to keep my 8:00 appointment. I would like to reschedule my appointment.     Thank you    Brunilda DE LEÓN 1945  [phone]

## 2019-01-24 ENCOUNTER — OFFICE VISIT (OUTPATIENT)
Dept: ORTHOPEDIC SURGERY | Facility: CLINIC | Age: 74
End: 2019-01-24

## 2019-01-24 VITALS — WEIGHT: 147 LBS | BODY MASS INDEX: 24.49 KG/M2 | HEIGHT: 65 IN

## 2019-01-24 DIAGNOSIS — M53.3 DISORDER OF SI (SACROILIAC) JOINT: ICD-10-CM

## 2019-01-24 DIAGNOSIS — G89.29 CHRONIC PAIN OF RIGHT HIP: ICD-10-CM

## 2019-01-24 DIAGNOSIS — M16.11 PRIMARY OSTEOARTHRITIS OF RIGHT HIP: Primary | ICD-10-CM

## 2019-01-24 DIAGNOSIS — M25.551 CHRONIC PAIN OF RIGHT HIP: ICD-10-CM

## 2019-01-24 PROCEDURE — 99214 OFFICE O/P EST MOD 30 MIN: CPT | Performed by: ORTHOPAEDIC SURGERY

## 2019-01-24 PROCEDURE — 20610 DRAIN/INJ JOINT/BURSA W/O US: CPT | Performed by: ORTHOPAEDIC SURGERY

## 2019-01-24 RX ORDER — HYDROCODONE BITARTRATE AND ACETAMINOPHEN 7.5; 325 MG/1; MG/1
1 TABLET ORAL NIGHTLY PRN
Qty: 30 TABLET | Refills: 0 | Status: SHIPPED | OUTPATIENT
Start: 2019-01-24 | End: 2019-04-30

## 2019-01-24 RX ADMIN — LIDOCAINE HYDROCHLORIDE 2 ML: 10 INJECTION, SOLUTION EPIDURAL; INFILTRATION; INTRACAUDAL; PERINEURAL at 15:16

## 2019-01-24 RX ADMIN — TRIAMCINOLONE ACETONIDE 40 MG: 40 INJECTION, SUSPENSION INTRA-ARTICULAR; INTRAMUSCULAR at 15:16

## 2019-01-24 NOTE — PROGRESS NOTES
"Brunilda Horner is a 73 y.o. female returns for     Chief Complaint   Patient presents with   • Right Hip - Follow-up, Pain       HISTORY OF PRESENT ILLNESS: f/u right hip pain.   xrays done today. Patient uses cane when walking long distances. Patient states that pain is intermittent.   She has pain on the outside of her hip as well as some pain in her groin.    CONCURRENT MEDICAL HISTORY:    The following portions of the patient's history were reviewed and updated as appropriate: allergies, current medications, past family history, past medical history, past social history, past surgical history and problem list.     ROS  No fevers or chills.  No chest pain or shortness of air.  No GI or  disturbances.    PHYSICAL EXAMINATION:       Ht 165.1 cm (65\")   Wt 66.7 kg (147 lb)   LMP  (LMP Unknown)   BMI 24.46 kg/m²     Physical Exam   Constitutional: She is oriented to person, place, and time. She appears well-developed and well-nourished.   Neurological: She is alert and oriented to person, place, and time.   Psychiatric: She has a normal mood and affect. Her behavior is normal. Judgment and thought content normal.       GAIT:     []  Normal  []  Antalgic    Assistive device: []  None  []  Walker     []  Crutches  [x]  Cane     []  Wheelchair  []  Stretcher    Right Hip Exam     Tenderness   The patient is experiencing tenderness in the greater trochanter.    Range of Motion   Flexion: 110   External rotation: 20   Internal rotation: 0     Muscle Strength   Flexion: 4/5     Tests   BROWN: positive  Fadir:  Positive FADIR test    Other   Erythema: absent  Sensation: normal  Pulse: present              Xr Hip With Or Without Pelvis 2 - 3 View Right    Result Date: 1/24/2019  Narrative: Ordering Provider:  Shady Canales MD Ordering Diagnosis/Indication:  Primary osteoarthritis of right hip Procedure:  XR HIP W OR WO PELVIS 2-3 VIEW RIGHT Exam Date:  1/24/19 COMPARISON:  Todays X-rays were compared to " previous images dated January 23, 2018.     Impression:  AP bilateral standing of the pelvis with AP and lateral of the right hip show moderate to severe arthritic changes noted in the right hip.  Complete loss of joint space along the medial aspect of the acetabulum.  Sclerotic changes noted in the femoral head with cystic changes noted on both the femoral head and in the acetabulum.  Spurring noted along the superior margin of the femoral neck and head.  Left hip shows minimal to no arthritic changes.  Mild to moderate arthritic changes noted in the visible portions of the lower lumbar spine.  Progressive worsening of the arthritic change in the right hip noted in comparison to prior x-ray. Shady Canales MD 1/24/19       Large Joint Arthrocentesis: R greater trochanteric bursa  Date/Time: 1/24/2019 3:16 PM  Consent given by: patient  Site marked: site marked  Timeout: Immediately prior to procedure a time out was called to verify the correct patient, procedure, equipment, support staff and site/side marked as required   Supporting Documentation  Indications: pain   Procedure Details  Location: hip - R greater trochanteric bursa  Preparation: Patient was prepped and draped in the usual sterile fashion  Needle size: 22 G  Approach: lateral  Medications administered: 2 mL lidocaine PF 1% 1 %; 40 mg triamcinolone acetonide 40 MG/ML  Patient tolerance: patient tolerated the procedure well with no immediate complications            ASSESSMENT:    Diagnoses and all orders for this visit:    Primary osteoarthritis of right hip  -     FL Injection Hip Right; Future  -     Ambulatory Referral to Physical Therapy Evaluate and treat; ROM (2 visits,  teach HEP), Strengthening    Disorder of SI (sacroiliac) joint  -     FL Injection Hip Right; Future  -     Ambulatory Referral to Physical Therapy Evaluate and treat; ROM (2 visits,  teach HEP), Strengthening    Chronic pain of right hip  -     FL Injection Hip Right;  Future  -     Ambulatory Referral to Physical Therapy Evaluate and treat; ROM (2 visits,  teach HEP), Strengthening    Other orders  -     Large Joint Arthrocentesis: R greater trochanteric bursa  -     HYDROcodone-acetaminophen (NORCO) 7.5-325 MG per tablet; Take 1 tablet by mouth At Night As Needed for Moderate Pain .          PLAN    Steroid injection into right hip   PT for 2 visits to teach HEP for strength and conditioning (OA right HIP)  Prescription for medication  Discussed eventual KRISTIN  Activity as tolerated.  Trochanteric injection today    Patient's Body mass index is 24.46 kg/m². BMI is within normal parameters. No follow-up required..      Return in about 6 weeks (around 3/7/2019) for recheck.    Shady Canales MD

## 2019-01-26 RX ORDER — TRIAMCINOLONE ACETONIDE 40 MG/ML
40 INJECTION, SUSPENSION INTRA-ARTICULAR; INTRAMUSCULAR
Status: COMPLETED | OUTPATIENT
Start: 2019-01-24 | End: 2019-01-24

## 2019-01-26 RX ORDER — LIDOCAINE HYDROCHLORIDE 10 MG/ML
2 INJECTION, SOLUTION EPIDURAL; INFILTRATION; INTRACAUDAL; PERINEURAL
Status: COMPLETED | OUTPATIENT
Start: 2019-01-24 | End: 2019-01-24

## 2019-02-04 ENCOUNTER — HOSPITAL ENCOUNTER (OUTPATIENT)
Dept: PHYSICAL THERAPY | Facility: HOSPITAL | Age: 74
Setting detail: THERAPIES SERIES
Discharge: HOME | End: 2019-02-04
Attending: ORTHOPAEDIC SURGERY

## 2019-02-04 DIAGNOSIS — M53.3 DISORDER OF SI (SACROILIAC) JOINT: ICD-10-CM

## 2019-02-04 DIAGNOSIS — G89.29 CHRONIC PAIN OF RIGHT HIP: ICD-10-CM

## 2019-02-04 DIAGNOSIS — M16.11 PRIMARY OSTEOARTHRITIS OF RIGHT HIP: Primary | ICD-10-CM

## 2019-02-04 DIAGNOSIS — M25.551 CHRONIC PAIN OF RIGHT HIP: ICD-10-CM

## 2019-02-04 PROCEDURE — 97162 PT EVAL MOD COMPLEX 30 MIN: CPT | Performed by: PHYSICAL THERAPIST

## 2019-02-04 PROCEDURE — 97110 THERAPEUTIC EXERCISES: CPT | Performed by: PHYSICAL THERAPIST

## 2019-02-04 NOTE — THERAPY EVALUATION
Outpatient Physical Therapy Ortho Initial Evaluation  [facility]     Patient Name: Brunilda Horner  : 1945  MRN: [id number]  Today's Date: 2019      Visit Date: 2019  Attendance:  (med necessity)  Subjective Improvement: n/a  Next MD Appt: 19  Recert Date: 19    Therapy Diagnosis: 1) R hip pain; 2) ITB syndrome         Past Medical History:   Diagnosis Date   • Anxiety     Generalized anxiety disorder    2015    • Blepharitis 2014   • Cellulitis of foot 2016   • Depression    • History of Papanicolaou smear of cervix 2009    negative   • Pure hypercholesterolemia 2015        Past Surgical History:   Procedure Laterality Date   • BREAST BIOPSY     • BREAST SURGERY  1994    Excision of mass, left breast. Mass, left breast   • COLONOSCOPY  2010    no polyps   • FOOT SURGERY  2001    Removal of tumor, radical resection of soft tissue, right foot. Recurrent plantar fibroma, plantar, right foot   • MAMMO STEREOTACTIC BREAST BIOPSY 1ST W WO DEVICE  1988    Needle localization and excision of micro-calcifications. Micro-calcifications, left breast   • SKIN BIOPSY  1991    Tangential excision keratoses left cheek. Exicison 25 papilloma, neck.       Current Outpatient Medications:   •  atorvastatin (LIPITOR) 10 MG tablet, Take 1 tablet by mouth Daily., Disp: 90 tablet, Rfl: 3  •  buPROPion XL (WELLBUTRIN XL) 300 MG 24 hr tablet, Take 1 tablet by mouth Daily., Disp: 90 tablet, Rfl: 1  •  cholecalciferol (VITAMIN D3) 1000 UNITS tablet, Take 1,000 Units by mouth daily., Disp: , Rfl:   •  CloNIDine (CATAPRES) 0.1 MG tablet, Take 1 tablet by mouth Every 8 (Eight) Hours As Needed for High Blood Pressure., Disp: 30 tablet, Rfl: 0  •  COENZYME Q-10 PO, Take  by mouth., Disp: , Rfl:   •  escitalopram (LEXAPRO) 10 MG tablet, Take 1 tablet by mouth Daily., Disp: 90 tablet, Rfl: 1  •  glucosamine sulfate 500 MG capsule capsule, Take 500 mg  "by mouth Every Morning., Disp: , Rfl:   •  HYDROcodone-acetaminophen (NORCO) 7.5-325 MG per tablet, Take 1 tablet by mouth Every 6 (Six) Hours As Needed for Moderate Pain ., Disp: 15 tablet, Rfl: 0  •  HYDROcodone-acetaminophen (NORCO) 7.5-325 MG per tablet, Take 1 tablet by mouth At Night As Needed for Moderate Pain ., Disp: 30 tablet, Rfl: 0  •  lidocaine viscous (XYLOCAINE) 2 % solution, , Disp: , Rfl:   •  lisinopril (PRINIVIL,ZESTRIL) 10 MG tablet, Take 1 tablet by mouth Daily., Disp: 90 tablet, Rfl: 1  •  Multiple Vitamins-Minerals (MULTIVITAMIN ADULT) tablet, Take  by mouth., Disp: , Rfl:   •  Omega-3 Fatty Acids (OMEGA-3 PO), Take  by mouth., Disp: , Rfl:   •  temazepam (RESTORIL) 30 MG capsule, Take 1 capsule by mouth At Night As Needed for Sleep., Disp: 30 capsule, Rfl: 5  •  tiZANidine (ZANAFLEX) 4 MG tablet, Take 1 tablet by mouth At Night As Needed for Muscle Spasms., Disp: 30 tablet, Rfl: 5  •  vitamin E 400 UNIT capsule, Take 400 Units by mouth daily., Disp: , Rfl:     No Known Allergies    Visit Dx:     ICD-10-CM ICD-9-CM   1. Primary osteoarthritis of right hip M16.11 715.15   2. Disorder of SI (sacroiliac) joint M53.3 724.6   3. Chronic pain of right hip M25.551 719.45    G89.29 338.29       Patient History     Row Name 02/04/19 1100          Chief Complaint  Difficulty Walking;Pain  -SS    Type of Pain  Hip pain right  -SS    Date Current Problem(s) Began  -- 2 years with progressive worsening  -SS    Brief Description of Current Complaint  Patient has an approximate 2 year history of progressively worsening R hip pain. Steroid injection into right greater trochanteric bursa on 1/24/19. Pain has improved since injection. Had started using a cane \"some, especially if I was going to be walking for a while.\" Has not used cane as much since injection. Walking better and less painful since injection. Prior to injection, she had difficulty sitting for prolonged periods and unable to lie on her R side. " She notes that her right hip would feel like it wanted to give out on her at times, worse with stairs. Patient reports that Dr. Canales has recommended a hip replacement, but she is currently trying to sort out her bother's estate in [place].  female with children. Lives 2 story house with 1 step to enter house from the garage, 2 steps down into living room, and 12 stairs down into basement.   -SS    Patient/Caregiver Goals  Improve strength;Relieve pain Increase stability  -SS    Current Tobacco Use  no  -SS    Smoking Status  quite > 40 yrs ago  -SS    Patient's Rating of General Health  Very good  -SS    Occupation/sports/leisure activities  Retired. Hobbies: door to door work with "[employer], read, computer work  -SS    What clinical tests have you had for this problem?  X-ray  -SS    Results of Clinical Tests  OA  -SS          Pain Location  Hip right  -SS    Pain at Present  0  -SS    Pain at Best  0  -SS    Pain at Worst  5 over past 1 month  -SS    Pain Frequency  Intermittent  -SS    Pain Description  Aching  -SS    What Performance Factors Make the Current Problem(s) WORSE?  weight shift to R, prolonged sitting, R sidelying  -SS    What Performance Factors Make the Current Problem(s) BETTER?  weight shift L  -SS    Is your sleep disturbed?  No  -SS    Is medication used to assist with sleep?  Yes  -SS    Difficulties at work?  n/a  -SS    Difficulties with ADL's?  pushing vacuum,   -SS    Difficulties with recreational activities?  none  -SS          Any falls in the past year:  Yes  -SS    Number of falls reported in the last 12 months  1  -SS    Factors that contributed to the fall:  Tripped  -SS    Does patient have a fear of falling  No  -SS          Primary Language  English  -SS          Are you being hurt, hit, or frightened by anyone at home or in your life?  No  -SS    Are you being neglected by a caregiver  No  -SS      User Key  (r) = Recorded By, (t) = Taken By, (c) = Cosigned By     Initials Name Provider Type    SS Ezio Crockett, PT DPT Physical Therapist          PT Ortho     Row Name 02/04/19 1100       Subjective Comments    Subjective Comments  see Therapy Patient History  -SS       Precautions and Contraindications    Precautions  significant OA R hip  -SS       Subjective Pain    Able to rate subjective pain?  yes  -SS    Pre-Treatment Pain Level  0  -SS       Posture/Observations    Posture/Observations Comments  Antalgic gait. Has cane that she uses some  -SS       Hip/Thigh Palpation    Greater Trochanter  Right:;Tender  -SS    Anterior Capsule  Right: non-tender  -SS    Iliopsoas  Right: non-tender  -SS    Gluteus Medius  Right:;Tender  -SS    Gluteus Minimus  Right:;Tender  -SS    Quadriceps  Right: non-tender  -SS    ASIS  Right: non-tender  -SS    ITB  Right: non-tender; tender prior to injection per patient  -SS       Right Lower Ext    Rt Hip ABduction AROM  15; pain R inguinal fold and posterior hip  -SS    Rt Hip ADduction AROM  30; pain R inguinal fold and R knee  -SS    Rt Hip Flexion AROM  105; pain R inguinal fold  -SS    Rt Hip External Rotation AROM  21; pain R posterior hip  -SS    Rt Hip Internal Rotation AROM  23; pain R posterior hip and inguinal fold  -SS       MMT Right Lower Ext    Rt Hip Flexion MMT, Gross Movement  (4/5) good pain inguinal fold  -SS    Rt Hip Extension MMT, Gross Movement  (5/5) normal back pain  -SS    Rt Hip ABduction MMT, Gross Movement  (5/5) normal pain inguinal fold/hip  -SS    Rt Hip ADduction MMT, Gross Movement  (5/5) normal medial and lateral hip pain  -SS    Rt Hip Internal (Medial) Rotation MMT, Gross Movement  (5/5) normal pain lateral hip and IT band  -SS    Rt Hip External (Lateral) Rotation MMT, Gross Movement  (5/5) normal  -SS    Rt Knee Extension MMT, Gross Movement  (5/5) normal  -SS    Rt Knee Flexion MMT, Gross Movement  (5/5) normal  -SS    Rt Ankle Plantarflexion MMT, Gross Movement  (5/5) normal  -SS    Rt  Ankle Dorsiflexion MMT, Gross Movement  (5/5) normal  -SS    Rt Lower Extremity Comments   Rectus femoris 3+/5; pain R inguinal fold   -       MMT Left Lower Ext    Lt Hip Flexion MMT, Gross Movement  (5/5) normal  -SS    Lt Hip Extension MMT, Gross Movement  (5/5) normal  -SS    Lt Hip ABduction MMT, Gross Movement  (5/5) normal  -SS    Lt Hip ADduction MMT, Gross Movement  (5/5) normal  -SS    Lt Hip Internal (Medial) Rotation MMT, Gross Movement  (5/5) normal  -SS    Lt Hip External (Lateral) Rotation MMT, Gross Movement  (5/5) normal  -SS    Lt Knee Extension MMT, Gross Movement  (5/5) normal  -SS    Lt Knee Flexion MMT, Gross Movement  (5/5) normal  -SS    Lt Ankle Plantarflexion MMT, Gross Movement  (5/5) normal  -SS    Lt Ankle Dorsiflexion MMT, Gross Movement  (5/5) normal  -SS    Lt Lower Extremity Comments   Rectus femoris 4/5  -      User Key  (r) = Recorded By, (t) = Taken By, (c) = Cosigned By    Initials Name Provider Type    Ezio Jara, PT DPT Physical Therapist                      Therapy Education  Education Details: today's HEP; therapy plan  Given: HEP, Other (comment)  Program: New  How Provided: Verbal, Demonstration, Written  Provided to: Patient  Level of Understanding: Verbalized, Demonstrated     PT OP Goals     Row Name 02/04/19 1100          STG Date to Achieve  -- STGs deferred  -SS          LTG Date to Achieve  02/25/19  -    LTG 1  Independent with self-management  -          PT Goal Re-Cert Due Date  02/25/19  -      User Key  (r) = Recorded By, (t) = Taken By, (c) = Cosigned By    Initials Name Provider Type    Ezio Jara, PT DPT Physical Therapist          PT Assessment/Plan     Row Name 02/04/19 1100          Functional Limitations  Impaired gait;Limitation in home management;Limitations in community activities;Limitations in functional capacity and performance  -    Impairments  Pain;Muscle strength  -    Assessment Comments  Patient has  OA of R hip with possible overlying ITB syndrome. Her symptoms are much improved after steroid injection on 1/24/19. Patient has social/family issues related to having to deal with her out-of-town brother's estate.  -SS    Rehab Potential  Good good for goals; barrier: social/familial stressors  -SS    Patient/caregiver participated in establishment of treatment plan and goals  Yes  -SS    Patient would benefit from skilled therapy intervention  Yes  -SS          PT Plan Comments  Patient will be seen on 2/13/19 for advancement of HEP and to assess progress. Will advance HEP at that time. Patient to contact P.T. clinic if questions or problems arise in intervening timeframe,  -SS      User Key  (r) = Recorded By, (t) = Taken By, (c) = Cosigned By    Initials Name Provider Type    Ezio Jara, PT DPT Physical Therapist            Exercises     Row Name 02/04/19 1100          Existing Precautions/Restrictions  -- significant OA R hip  -SS          Subjective Comments  see Therapy Patient History  -SS          Able to rate subjective pain?  yes  -SS    Pre-Treatment Pain Level  0  -SS    Post-Treatment Pain Level  0  -SS          Exercise Name 1  Supine hip ER/IR with knee extended  -SS    Cueing 1  Verbal;Tactile  -SS    Sets 1  1  -SS    Reps 1  5  -SS    Time 1  10 sec hold each position  -SS          Exercise Name 2  Clamshell with R foot resting on L knee  -SS    Cueing 2  Verbal;Tactile  -SS    Sets 2  1  -SS    Reps 2  10  -SS          Exercise Name 3  Supine flexion SLR  -SS    Cueing 3  Verbal;Tactile  -SS    Sets 3  1  -SS    Reps 3  5  -SS    Time 3  5 sec hold  -SS          Exercise Name 4  Supine hip flexion  -SS    Cueing 4  Verbal;Tactile  -SS    Sets 4  1  -SS    Reps 4  10  -SS      User Key  (r) = Recorded By, (t) = Taken By, (c) = Cosigned By    Initials Name Provider Type    Ezio Jara, PT DPT Physical Therapist                        Outcome Measure Options: Lower  Extremity Functional Scale (LEFS)  Lower Extremity Functional Index  Any of your usual work, housework or school activities: A little bit of difficulty  Getting into or out of the bath: No difficulty  Walking between rooms: No difficulty  Putting on your shoes or socks: No difficulty  Squatting: A little bit of difficulty  Lifting an object, like a bag of groceries from the floor: A little bit of difficulty  Performing light activities around your home: No difficulty  Performing heavy activities around your home: A little bit of difficulty  Getting into or out of a car: A little bit of difficulty  Walking 2 blocks: A little bit of difficulty  Walking a mile: Moderate difficulty  Going up or down 10 stairs (about 1 flight of stairs): A little bit of difficulty  Standing for 1 hour: A little bit of difficulty  Sitting for 1 hour: No difficulty  Running on even ground: Quite a bit of difficulty  Running on uneven ground: Quite a bit of difficulty  Making sharp turns while running fast: No difficulty  Hopping: No difficulty  Rolling over in bed: Quite a bit of difficulty      Time Calculation:         Start Time: 1105  Stop Time: 1206  Time Calculation (min): 61 min  Total Timed Code Minutes- PT: 10 minute(s)     Therapy Charges for Today     Code Description Service Date Service Provider Modifiers Qty    98022875779 HC PT EVAL MOD COMPLEXITY 3 2/4/2019 Ezoi Crockett, PT DPT GP 1    62109960278 HC PT THER PROC EA 15 MIN 2/4/2019 Ezio Crockett, PT DPT GP 1                   Ezio Crockett, PT, DPT, CHT  2/4/2019

## 2019-02-13 ENCOUNTER — APPOINTMENT (OUTPATIENT)
Dept: PHYSICAL THERAPY | Facility: HOSPITAL | Age: 74
End: 2019-02-13
Attending: ORTHOPAEDIC SURGERY

## 2019-02-19 ENCOUNTER — TELEPHONE (OUTPATIENT)
Dept: ORTHOPEDIC SURGERY | Facility: CLINIC | Age: 74
End: 2019-02-19

## 2019-02-19 NOTE — TELEPHONE ENCOUNTER
Regarding: Non-Urgent Medical Question  Contact: [phone]  ----- Message from Mychart, Generic sent at 2/18/2019 10:02 PM EST -----    I'm unable to make appt. on the 19th....have a stomach virus.....please reschedule for a later date.  Thank you,  Brunilda Horner  [phone]  BD [birth year]

## 2019-02-28 ENCOUNTER — TELEPHONE (OUTPATIENT)
Dept: FAMILY MEDICINE CLINIC | Facility: CLINIC | Age: 74
End: 2019-02-28

## 2019-02-28 DIAGNOSIS — F32.A CHRONIC DEPRESSION: Chronic | ICD-10-CM

## 2019-02-28 RX ORDER — ESCITALOPRAM OXALATE 10 MG/1
10 TABLET ORAL DAILY
Qty: 90 TABLET | Refills: 1 | Status: SHIPPED | OUTPATIENT
Start: 2019-02-28 | End: 2019-11-21 | Stop reason: SDUPTHER

## 2019-02-28 RX ORDER — LISINOPRIL 10 MG/1
10 TABLET ORAL DAILY
Qty: 90 TABLET | Refills: 1 | Status: SHIPPED | OUTPATIENT
Start: 2019-02-28 | End: 2019-05-13 | Stop reason: SDUPTHER

## 2019-02-28 NOTE — TELEPHONE ENCOUNTER
AMOS BLANDON IS NEEDING REFILL ON THE LISINOPRIL 10MG  AND LEXPRO 10MG TO BE SENT TO [facility]

## 2019-04-15 ENCOUNTER — TELEPHONE (OUTPATIENT)
Dept: FAMILY MEDICINE CLINIC | Facility: CLINIC | Age: 74
End: 2019-04-15

## 2019-04-16 DIAGNOSIS — F32.A CHRONIC DEPRESSION: Chronic | ICD-10-CM

## 2019-04-16 RX ORDER — TEMAZEPAM 30 MG/1
30 CAPSULE ORAL NIGHTLY PRN
Qty: 30 CAPSULE | Refills: 0 | Status: SHIPPED | OUTPATIENT
Start: 2019-04-16 | End: 2019-05-13 | Stop reason: SDUPTHER

## 2019-04-30 ENCOUNTER — OFFICE VISIT (OUTPATIENT)
Dept: ORTHOPEDIC SURGERY | Facility: CLINIC | Age: 74
End: 2019-04-30

## 2019-04-30 VITALS — BODY MASS INDEX: 24.74 KG/M2 | WEIGHT: 148.5 LBS | HEIGHT: 65 IN

## 2019-04-30 DIAGNOSIS — G89.29 CHRONIC PAIN OF RIGHT HIP: ICD-10-CM

## 2019-04-30 DIAGNOSIS — M25.551 CHRONIC PAIN OF RIGHT HIP: ICD-10-CM

## 2019-04-30 DIAGNOSIS — I10 ESSENTIAL HYPERTENSION: ICD-10-CM

## 2019-04-30 DIAGNOSIS — M53.3 DISORDER OF SI (SACROILIAC) JOINT: ICD-10-CM

## 2019-04-30 DIAGNOSIS — M16.11 PRIMARY OSTEOARTHRITIS OF RIGHT HIP: Primary | ICD-10-CM

## 2019-04-30 PROCEDURE — 99214 OFFICE O/P EST MOD 30 MIN: CPT | Performed by: ORTHOPAEDIC SURGERY

## 2019-04-30 RX ORDER — HYDROCODONE BITARTRATE AND ACETAMINOPHEN 7.5; 325 MG/1; MG/1
1 TABLET ORAL EVERY 8 HOURS PRN
Qty: 30 TABLET | Refills: 0 | Status: SHIPPED | OUTPATIENT
Start: 2019-04-30 | End: 2019-05-07 | Stop reason: SDUPTHER

## 2019-05-01 RX ORDER — BUPIVACAINE HCL/0.9 % NACL/PF 0.1 %
2 PLASTIC BAG, INJECTION (ML) EPIDURAL ONCE
Status: CANCELLED | OUTPATIENT
Start: 2019-05-29 | End: 2019-05-01

## 2019-05-01 RX ORDER — ACETAMINOPHEN 500 MG
1000 TABLET ORAL ONCE
Status: CANCELLED | OUTPATIENT
Start: 2019-05-29 | End: 2019-05-01

## 2019-05-01 RX ORDER — MELOXICAM 15 MG/1
15 TABLET ORAL ONCE
Status: CANCELLED | OUTPATIENT
Start: 2019-05-29 | End: 2019-05-01

## 2019-05-01 RX ORDER — OXYCODONE HCL 10 MG/1
10 TABLET, FILM COATED, EXTENDED RELEASE ORAL ONCE
Status: CANCELLED | OUTPATIENT
Start: 2019-05-29 | End: 2019-05-01

## 2019-05-06 ENCOUNTER — HOSPITAL ENCOUNTER (OUTPATIENT)
Dept: PHYSICAL THERAPY | Facility: HOSPITAL | Age: 74
Setting detail: THERAPIES SERIES
Discharge: HOME | End: 2019-05-06
Attending: ORTHOPAEDIC SURGERY

## 2019-05-06 ENCOUNTER — TELEPHONE (OUTPATIENT)
Dept: FAMILY MEDICINE CLINIC | Facility: CLINIC | Age: 74
End: 2019-05-06

## 2019-05-06 DIAGNOSIS — M16.0 BILATERAL PRIMARY OSTEOARTHRITIS OF HIP: Chronic | ICD-10-CM

## 2019-05-06 DIAGNOSIS — M25.551 CHRONIC PAIN OF RIGHT HIP: ICD-10-CM

## 2019-05-06 DIAGNOSIS — M16.11 PRIMARY OSTEOARTHRITIS OF RIGHT HIP: Primary | ICD-10-CM

## 2019-05-06 DIAGNOSIS — G89.29 CHRONIC PAIN OF RIGHT HIP: ICD-10-CM
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
## 2019-05-06 PROCEDURE — 97110 THERAPEUTIC EXERCISES: CPT | Performed by: PHYSICAL THERAPIST

## 2019-05-06 PROCEDURE — 97161 PT EVAL LOW COMPLEX 20 MIN: CPT | Performed by: PHYSICAL THERAPIST

## 2019-05-06 PROCEDURE — G0283 ELEC STIM OTHER THAN WOUND: HCPCS | Performed by: PHYSICAL THERAPIST

## 2019-05-06 RX ORDER — TIZANIDINE 4 MG/1
4 TABLET ORAL NIGHTLY PRN
Qty: 30 TABLET | Refills: 5 | Status: SHIPPED | OUTPATIENT
Start: 2019-05-06 | End: 2019-06-26 | Stop reason: SDUPTHER

## 2019-05-06 NOTE — THERAPY EVALUATION
Outpatient Physical Therapy Ortho Initial Evaluation  [facility]     Patient Name: Brunilda Horner  : 1945  MRN: [id number]  Today's Date: 2019      Visit Date: 2019      Subjective Evaluation    History of Present Illness  Date of onset: 2019  Mechanism of injury: Fell on the steps    Subjective comment: OA Right hip did therapy in Feb.  Fell in April and can barely move it and pain is bad.  Patient Occupation: Retired Quality of life: good    Pain  Current pain rating: 10  At best pain ratin  At worst pain rating: 10  Location: Hurting in Right buttock and post thigh.  Quality: throbbing  Relieving factors: change in position and medications  Aggravating factors: stairs, ambulation, squatting, movement and standing    Social Support  Lives in: one-story house  Lives with: spouse    Hand dominance: right    Diagnostic Tests  X-ray: abnormal (OA)    Treatments  Previous treatment: physical therapy  Current treatment: medication  Patient Goals  Patient goals for therapy: decreased edema, decreased pain, increased motion, increased strength and independence with ADLs/IADLs          Patient Active Problem List   Diagnosis   • Generalized anxiety disorder   • Chronic depression   • Chronic pain of right hip   • Bilateral primary osteoarthritis of hip   • Disorder of SI (sacroiliac) joint   • Primary osteoarthritis of right hip   • Other hyperlipidemia        Past Medical History:   Diagnosis Date   • Anxiety     Generalized anxiety disorder    2015    • Blepharitis 2014   • Cellulitis of foot 2016   • Depression    • History of Papanicolaou smear of cervix 2009    negative   • Pure hypercholesterolemia 2015        Past Surgical History:   Procedure Laterality Date   • BREAST BIOPSY     • BREAST SURGERY  1994    Excision of mass, left breast. Mass, left breast   • COLONOSCOPY  2010    no polyps   • FOOT SURGERY  2001    Removal of tumor,  radical resection of soft tissue, right foot. Recurrent plantar fibroma, plantar, right foot   • MAMMO STEREOTACTIC BREAST BIOPSY 1ST W WO DEVICE  06/08/1988    Needle localization and excision of micro-calcifications. Micro-calcifications, left breast   • SKIN BIOPSY  06/06/1991    Tangential excision keratoses left cheek. Exicison 25 papilloma, neck.   Medications (Admitted on 5/6/2019)    atorvastatin (LIPITOR) 10 MG tablet    buPROPion XL (WELLBUTRIN XL) 300 MG 24 hr tablet    cholecalciferol (VITAMIN D3) 1000 UNITS tablet    COENZYME Q-10 PO    escitalopram (LEXAPRO) 10 MG tablet    glucosamine sulfate 500 MG capsule capsule    HYDROcodone-acetaminophen (NORCO) 7.5-325 MG per tablet    lidocaine viscous (XYLOCAINE) 2 % solution    lisinopril (PRINIVIL,ZESTRIL) 10 MG tablet    Multiple Vitamins-Minerals (MULTIVITAMIN ADULT) tablet    Omega-3 Fatty Acids (OMEGA-3 PO)    temazepam (RESTORIL) 30 MG capsule    tiZANidine (ZANAFLEX) 4 MG tablet    vitamin E 400 UNIT capsule   ALLERGIES: NKDA      Visit Dx:     ICD-10-CM ICD-9-CM   1. Primary osteoarthritis of right hip M16.11 715.15   2. Chronic pain of right hip M25.551 719.45    G89.29 338.29   3. Fall (on) (from) unspecified stairs and steps, subsequent encounter W10.9XXD V58.89     E880.9           Objective       Tenderness     Right Hip   Tenderness in the PSIS, ischial tuberosity and sacroiliac joint.     Active Range of Motion     Right Hip   Flexion: 125 degrees   Extension: 0 degrees   Abduction: 10 degrees   Adduction: 0 degrees     Passive Range of Motion   Left Hip   Flexion: WFL  Extension: WFL  Abduction: WFL  Adduction: WFL  External rotation (90/90): WFL  Internal rotation (90/90): WFL    Right Hip   Flexion: 130 degrees   Extension: 10 degrees   Abduction: 10 degrees   Adduction: 0 degrees   External rotation (90/90): 25 degrees   Internal rotation (90/90): 10 degrees     Not tolerant to resistance. TTP pifromis, and right Lumbar paraspinals.IT  band.  PT Ortho     Row Name 05/06/19 1100       Precautions and Contraindications    Precautions  significant OA R hip  -DD      User Key  (r) = Recorded By, (t) = Taken By, (c) = Cosigned By    Initials Name Provider Type    Kelly Rueda, PT DPT Physical Therapist        Can't position for Fabers, Neg SLR. Slight relief with distraction.  Buise size of baseball on lateral / posterior thigh.  LLD right < L 1/2 inch           Assessment/Plan     PT OP Goals     Row Name 05/06/19 1100          PT Short Term Goals    STG Date to Achieve  05/27/19  -DD     STG 1  Pt will be independent in HEP  -DD     STG 2  Pt will be able to walk with Min hip pain with the cane  -DD     STG 3  Pt will have AROM hip abd  20 deg  -DD     STG 4  Pt will have passive hip ext  20 degrees  -DD     STG 5  LEFS score of 25 or more  -DD        Long Term Goals    LTG Date to Achieve  06/04/19  -DD     LTG 1  Eccymosis from bruising resolved.  -DD     LTG 2  Pain to decrease to 4/10 or less with walking  -DD        Time Calculation    PT Goal Re-Cert Due Date  05/27/19  -DD       User Key  (r) = Recorded By, (t) = Taken By, (c) = Cosigned By    Initials Name Provider Type    Kelly Rueda, PT DPT Physical Therapist          PT Assessment/Plan     Row Name 05/06/19 1200          PT Assessment    Assessment Comments  Pt has history of sever OA. The pain is increased due to fall and contusion of the hip. She is improving since fall and is now using cane instead of walker. SI joint dysfunction and LLD  indicative of upslip mechanism. Pt tolerated ME fair but did not generate much force due to pain, Would beneift from Manual therapy and joint mobilitzation of SI joint and  Ice / stim.  Progress gait as able.  -DD     Please refer to paper survey for additional self-reported information  Yes  -DD     Rehab Potential  Good  -DD     Patient/caregiver participated in establishment of treatment plan and goals  Yes  -DD      Patient would benefit from skilled therapy intervention  Yes  -DD        PT Plan    PT Frequency  2x/week  -DD     Predicted Duration of Therapy Intervention (Therapy Eval)  4 weeks  -DD     Planned CPT's?  PT EVAL LOW COMPLEXITY: 80768;PT THER PROC EA 15 MIN: 65102;PT MANUAL THERAPY EA 15 MIN: 12203;PT GAIT TRAINING EA 15 MIN: 12111;PT ELECTRICAL STIM UNATTEND: ;PT HOT OR COLD PACK TREAT MCARE  -DD     Physical Therapy Interventions (Optional Details)  stretching;strengthening;stair training;modalities;home exercise program  -DD     PT Plan Comments  ROM as able check alignment.  gait and isometrics. Ice and stim  -DD       User Key  (r) = Recorded By, (t) = Taken By, (c) = Cosigned By    Initials Name Provider Type    Kelly Rueda, PT DPT Physical Therapist          Modalities     Row Name 05/06/19 1100             Ice    Ice Applied  Yes  -DD      Location  right hip  -DD      Rx Minutes  -- 20  -DD      Ice S/P Rx  Yes  -DD         ELECTRICAL STIMULATION    Attended/Unattended  Unattended  -DD      Stimulation Type  IFC  -DD      Max mAmp  18  -DD      Location/Electrode Placement/Other  right hip 20 min  -DD        User Key  (r) = Recorded By, (t) = Taken By, (c) = Cosigned By    Initials Name Provider Type    Kelly Rueda, PT DPT Physical Therapist        Exercises     Row Name 05/06/19 1200             Exercise 1    Exercise Name 1  Manual ME SI joint  -DD      Time 1  5'  -DD      Additional Comments  upslip/ LLD  -DD         Exercise 2    Exercise Name 2  PROM of hip  -DD      Time 2  5'  -DD         Exercise 3    Exercise Name 3  distraction of hip  -DD      Reps 3  2'  -DD        User Key  (r) = Recorded By, (t) = Taken By, (c) = Cosigned By    Initials Name Provider Type    Kelly Rueda, PT DPT Physical Therapist           Outcome Measure Options: Lower Extremity Functional Scale (LEFS)  Lower Extremity Functional Index  Any of your usual work, housework or  school activities: Extreme difficulty or unable to perform activity  Your usual hobbies, recreational or sporting activities: Extreme difficulty or unable to perform activity  Getting into or out of the bath: Quite a bit of difficulty  Walking between rooms: Quite a bit of difficulty  Putting on your shoes or socks: Moderate difficulty  Squatting: Moderate difficulty  Lifting an object, like a bag of groceries from the floor: Extreme difficulty or unable to perform activity  Performing light activities around your home: Quite a bit of difficulty  Performing heavy activities around your home: Extreme difficulty or unable to perform activity  Getting into or out of a car: Quite a bit of difficulty  Walking 2 blocks: Extreme difficulty or unable to perform activity  Walking a mile: Extreme difficulty or unable to perform activity  Going up or down 10 stairs (about 1 flight of stairs): Extreme difficulty or unable to perform activity  Standing for 1 hour: Extreme difficulty or unable to perform activity  Sitting for 1 hour: Quite a bit of difficulty  Running on even ground: Extreme difficulty or unable to perform activity  Running on uneven ground: Extreme difficulty or unable to perform activity  Making sharp turns while running fast: Extreme difficulty or unable to perform activity  Hopping: Extreme difficulty or unable to perform activity  Rolling over in bed: Extreme difficulty or unable to perform activity  Total: 9      Time Calculation:     Start Time: 1100  Stop Time: 1145  Time Calculation (min): 45 min  Total Timed Code Minutes- PT: 12 minute(s)     Therapy Charges for Today     Code Description Service Date Service Provider Modifiers Qty    11790908078 HC PT EVAL LOW COMPLEXITY 1 5/6/2019 Kelly Marie PT DPT GP 1    79001982454 HC PT ELECTRICAL STIM UNATTENDED 5/6/2019 Kelly Marie, PT DPT  1    83666486082 HC PT THER PROC EA 15 MIN 5/6/2019 Kelly Marie, PT DPT GP 1          PT  G-Codes  Outcome Measure Options: Lower Extremity Functional Scale (LEFS)  Total: 9       Kelly Marie, PT DPT, ATC  5/6/2019

## 2019-05-07 RX ORDER — HYDROCODONE BITARTRATE AND ACETAMINOPHEN 7.5; 325 MG/1; MG/1
1 TABLET ORAL EVERY 8 HOURS PRN
Qty: 30 TABLET | Refills: 0 | Status: SHIPPED | OUTPATIENT
Start: 2019-05-07 | End: 2019-05-30 | Stop reason: HOSPADM

## 2019-05-09 ENCOUNTER — HOSPITAL ENCOUNTER (OUTPATIENT)
Dept: PHYSICAL THERAPY | Facility: HOSPITAL | Age: 74
Setting detail: THERAPIES SERIES
Discharge: HOME | End: 2019-05-09
Attending: ORTHOPAEDIC SURGERY

## 2019-05-09 DIAGNOSIS — G89.29 CHRONIC PAIN OF RIGHT HIP: ICD-10-CM

## 2019-05-09 DIAGNOSIS — M16.11 PRIMARY OSTEOARTHRITIS OF RIGHT HIP: Primary | ICD-10-CM

## 2019-05-09 DIAGNOSIS — M25.551 CHRONIC PAIN OF RIGHT HIP: ICD-10-CM
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
## 2019-05-09 PROCEDURE — 97140 MANUAL THERAPY 1/> REGIONS: CPT

## 2019-05-09 PROCEDURE — 97110 THERAPEUTIC EXERCISES: CPT

## 2019-05-09 PROCEDURE — G0283 ELEC STIM OTHER THAN WOUND: HCPCS

## 2019-05-09 NOTE — THERAPY TREATMENT NOTE
Outpatient Physical Therapy Ortho Treatment Note  [facility]     Patient Name: Brunilda Horner  : 1945  MRN: [id number]  Today's Date: 2019      Visit Date: 2019  Subjective Improvement: 55%  Visit Number:     Recert Date:   19  MD Visit:   5/15/19   Total Approved Visits:  Medicare    Visit Dx:    ICD-10-CM ICD-9-CM   1. Primary osteoarthritis of right hip M16.11 715.15   2. Chronic pain of right hip M25.551 719.45    G89.29 338.29   3. Fall (on) (from) unspecified stairs and steps, subsequent encounter W10.9XXD V58.89     E880.9       Patient Active Problem List   Diagnosis   • Generalized anxiety disorder   • Chronic depression   • Chronic pain of right hip   • Bilateral primary osteoarthritis of hip   • Disorder of SI (sacroiliac) joint   • Primary osteoarthritis of right hip   • Other hyperlipidemia        Past Medical History:   Diagnosis Date   • Anxiety     Generalized anxiety disorder    2015    • Blepharitis 2014   • Cellulitis of foot 2016   • Depression    • History of Papanicolaou smear of cervix 2009    negative   • Pure hypercholesterolemia 2015        Past Surgical History:   Procedure Laterality Date   • BREAST BIOPSY     • BREAST SURGERY  1994    Excision of mass, left breast. Mass, left breast   • COLONOSCOPY  2010    no polyps   • FOOT SURGERY  2001    Removal of tumor, radical resection of soft tissue, right foot. Recurrent plantar fibroma, plantar, right foot   • MAMMO STEREOTACTIC BREAST BIOPSY 1ST W WO DEVICE  1988    Needle localization and excision of micro-calcifications. Micro-calcifications, left breast   • SKIN BIOPSY  1991    Tangential excision keratoses left cheek. Exicison 25 papilloma, neck.       PT Ortho     Row Name 19 1300       Precautions and Contraindications    Precautions  significant OA R hip  -BB       Posture/Observations    Posture/Observations Comments  ADDI Garcia  ASIS lower than L. Tenderness R post hip/piriformis and ischial tuberosity.  Ecchymosis R lat hip / GT Tenderness and gaurding noted with R hip.  -BB       Gait/Stairs Assessment/Training    Comment (Gait/Stairs)  Able to demo a few short steps without AD but was antalgia.   -BB      User Key  (r) = Recorded By, (t) = Taken By, (c) = Cosigned By    Initials Name Provider Type    Aby Alexander PTA Physical Therapy Assistant                      PT Assessment/Plan     Row Name 05/09/19 1351          PT Assessment    Assessment Comments  Good tolerance and effort. Good demo of HEP provided.  Did not perform MET due to R LE no longer being short.  Focused on STM and MFR to tender musculature to improve soft tissue extensibility.   -BB        PT Plan    PT Frequency  2x/week  -BB     Predicted Duration of Therapy Intervention (Therapy Eval)  x 4 weeks  -BB     PT Plan Comments  Continue with manual as tolerated, stretch, ROM and modalities for pain and edema.   -BB       User Key  (r) = Recorded By, (t) = Taken By, (c) = Cosigned By    Initials Name Provider Type    Aby Alexander PTA Physical Therapy Assistant          Modalities     Row Name 05/09/19 1300             Ice    Ice Applied  Yes  -BB      Location  R post hip in L SL with IFC  -BB      Rx Minutes  -- 20  -BB      Ice S/P Rx  Yes  -BB         ELECTRICAL STIMULATION    Attended/Unattended  Unattended  -BB      Stimulation Type  IFC  -BB      Location/Electrode Placement/Other  R post hip with ice x 20 min  -BB        User Key  (r) = Recorded By, (t) = Taken By, (c) = Cosigned By    Initials Name Provider Type    Aby Alexander PTA Physical Therapy Assistant        Exercises     Row Name 05/09/19 1300             Subjective Comments    Subjective Comments  States the evening after eval she was in a little pain but the next day it was a little better.  Having KRISTIN 5/29/19.   -BB         Subjective Pain    Able to rate subjective pain?  yes   -BB      Pre-Treatment Pain Level  -- sitting 1/10, with gait 4/10  -BB      Post-Treatment Pain Level  -- pain in bilat hip adductors 2-3/10. No pain post hip after.   -BB         Exercise 1    Exercise Name 1  Pro II level 1 seat 10  -BB      Time 1  5 min  -BB         Exercise 2    Exercise Name 2  Incline stretch  -BB      Reps 2  3  -BB      Time 2  30  -BB         Exercise 3    Exercise Name 3  PROM R hip flex/ext  -BB      Time 3  10  -BB         Exercise 4    Exercise Name 4  Supine HS stretch R  -BB      Reps 4  3  -BB      Time 4  30  -BB         Exercise 5    Exercise Name 5  hooklying t band hip abd  -BB      Reps 5  10  -BB      Additional Comments  yellow  -BB         Exercise 6    Exercise Name 6  hooklying add sq  -BB      Reps 6  10  -BB         Exercise 7    Exercise Name 7  Manual to post hip and SI region in L SL - See manual flowhseet  -BB      Time 7  10 min  -BB         Exercise 8    Exercise Name 8  IFC and ice see flowsheet  -BB        User Key  (r) = Recorded By, (t) = Taken By, (c) = Cosigned By    Initials Name Provider Type    Aby Alexander PTA Physical Therapy Assistant                      Manual Rx (last 36 hours)      Manual Treatments     Row Name 05/09/19 1300             Manual Rx 1    Manual Rx 1 Location  STM and MFR R piriformis and glute   -BB      Manual Rx 1 Duration  10 min in sidelying  -BB        User Key  (r) = Recorded By, (t) = Taken By, (c) = Cosigned By    Initials Name Provider Type    Aby Alexander PTA Physical Therapy Assistant          PT OP Goals     Row Name 05/09/19 1300          PT Short Term Goals    STG Date to Achieve  05/27/19  -BB     STG 1  Pt will be independent in HEP  -BB     STG 1 Progress  Not Met  -BB     STG 2  Pt will be able to walk with Min hip pain with the cane  -BB     STG 2 Progress  Not Met  -BB     STG 3  Pt will have AROM hip abd  20 deg  -BB     STG 3 Progress  Not Met  -BB     STG 4  Pt will have passive hip ext  20  degrees  -BB     STG 4 Progress  Not Met  -BB     STG 5  LEFS score of 25 or more  -BB     STG 5 Progress  Not Met  -BB        Long Term Goals    LTG Date to Achieve  06/04/19  -BB     LTG 1  Eccymosis from bruising resolved.  -BB     LTG 1 Progress  Not Met  -BB     LTG 2  Pain to decrease to 4/10 or less with walking  -BB     LTG 2 Progress  Not Met  -BB        Time Calculation    PT Goal Re-Cert Due Date  05/27/19  -BB       User Key  (r) = Recorded By, (t) = Taken By, (c) = Cosigned By    Initials Name Provider Type    Aby Alexander PTA Physical Therapy Assistant          Therapy Education  Education Details: piriformis stretch, add sq and t band hip abd  Given: HEP  Program: New              Time Calculation:   Start Time: 1345  Stop Time: 1456  Time Calculation (min): 71 min  Total Timed Code Minutes- PT: 40 minute(s)  Therapy Charges for Today     Code Description Service Date Service Provider Modifiers Qty    47963190596 HC PT THER PROC EA 15 MIN 5/9/2019 Aby Sibley PTA GP 2    00791675189 HC PT MANUAL THERAPY EA 15 MIN 5/9/2019 Aby Sibley PTA GP 1    79236403794 HC PT ELECTRICAL STIM UNATTENDED 5/9/2019 Aby Sibley PTA  1                    Aby Sibley PTA  5/9/2019

## 2019-05-13 ENCOUNTER — OFFICE VISIT (OUTPATIENT)
Dept: FAMILY MEDICINE CLINIC | Facility: CLINIC | Age: 74
End: 2019-05-13

## 2019-05-13 VITALS
WEIGHT: 144.5 LBS | DIASTOLIC BLOOD PRESSURE: 80 MMHG | BODY MASS INDEX: 24.07 KG/M2 | SYSTOLIC BLOOD PRESSURE: 122 MMHG | HEIGHT: 65 IN

## 2019-05-13 DIAGNOSIS — E78.49 OTHER HYPERLIPIDEMIA: Chronic | ICD-10-CM

## 2019-05-13 DIAGNOSIS — F32.A CHRONIC DEPRESSION: Chronic | ICD-10-CM

## 2019-05-13 DIAGNOSIS — Z12.31 VISIT FOR SCREENING MAMMOGRAM: ICD-10-CM

## 2019-05-13 DIAGNOSIS — I10 ESSENTIAL HYPERTENSION: Primary | Chronic | ICD-10-CM

## 2019-05-13 DIAGNOSIS — F41.1 GENERALIZED ANXIETY DISORDER: Chronic | ICD-10-CM

## 2019-05-13 PROCEDURE — 99214 OFFICE O/P EST MOD 30 MIN: CPT | Performed by: FAMILY MEDICINE

## 2019-05-13 RX ORDER — LISINOPRIL 10 MG/1
10 TABLET ORAL DAILY
Qty: 90 TABLET | Refills: 1 | Status: SHIPPED | OUTPATIENT
Start: 2019-05-13 | End: 2019-11-21 | Stop reason: SDUPTHER

## 2019-05-13 RX ORDER — TEMAZEPAM 30 MG/1
30 CAPSULE ORAL NIGHTLY PRN
Qty: 30 CAPSULE | Refills: 0 | Status: SHIPPED | OUTPATIENT
Start: 2019-05-13 | End: 2019-06-17 | Stop reason: SDUPTHER

## 2019-05-13 RX ORDER — BUPROPION HYDROCHLORIDE 300 MG/1
300 TABLET ORAL DAILY
Qty: 90 TABLET | Refills: 1 | Status: SHIPPED | OUTPATIENT
Start: 2019-05-13 | End: 2019-11-21 | Stop reason: SDUPTHER

## 2019-05-13 NOTE — PROGRESS NOTES
Subjective   Brunilda Horner is a 74 y.o. female.     History of Present Illness   follow-up mild hypertension hyperlipidemia chronic hip pain mild dysthymia etc.  In the interim contemplating is scheduling a hip surgery for replacement as mentioned.  Time for lab work and mammogram.  States other than the hip feeling well all medicines working well.  History noted.    The following portions of the patient's history were reviewed and updated as appropriate: allergies, current medications, past family history, past medical history, past social history, past surgical history and problem list.    Review of Systems   Constitutional: Negative for activity change, appetite change, fatigue and unexpected weight change.   HENT: Negative for trouble swallowing and voice change.    Eyes: Negative for redness and visual disturbance.   Respiratory: Negative for cough and wheezing.    Cardiovascular: Negative for chest pain and palpitations.   Gastrointestinal: Negative for abdominal pain, constipation, diarrhea, nausea and vomiting.   Genitourinary: Negative for urgency.   Musculoskeletal: Positive for arthralgias and gait problem. Negative for joint swelling.   Neurological: Negative for syncope and headaches.   Hematological: Negative for adenopathy.   Psychiatric/Behavioral: Negative for sleep disturbance.       Objective   Physical Exam   Constitutional: She is oriented to person, place, and time. She appears well-developed.   HENT:   Head: Normocephalic.   Nose: Nose normal.   Eyes: Pupils are equal, round, and reactive to light.   Neck: Normal range of motion. No thyromegaly present.   Cardiovascular: Normal rate, regular rhythm, normal heart sounds and intact distal pulses. Exam reveals no gallop and no friction rub.   No murmur heard.  Pulmonary/Chest: Breath sounds normal.   Abdominal: Soft. She exhibits no distension and no mass. There is no tenderness.   Musculoskeletal: Normal range of motion.   Using walker    Neurological: She is alert and oriented to person, place, and time. She has normal reflexes.   Skin: Skin is warm and dry.   Psychiatric: She has a normal mood and affect. Her speech is normal and behavior is normal.       Assessment/Plan   Brunilda was seen today for hypertension.    Diagnoses and all orders for this visit:    Essential hypertension  -     Comprehensive Metabolic Panel  -     Magnesium  -     lisinopril (PRINIVIL,ZESTRIL) 10 MG tablet; Take 1 tablet by mouth Daily.    Other hyperlipidemia  -     Lipid Panel With LDL / HDL Ratio    Chronic depression  -     temazepam (RESTORIL) 30 MG capsule; Take 1 capsule by mouth At Night As Needed for Sleep.  -     buPROPion XL (WELLBUTRIN XL) 300 MG 24 hr tablet; Take 1 tablet by mouth Daily.    Generalized anxiety disorder    Visit for screening mammogram  -     Mammo Screening Digital Tomosynthesis Bilateral With CAD      Counseled continue lifestyle measures labs as above recheck 6 months

## 2019-05-14 ENCOUNTER — HOSPITAL ENCOUNTER (OUTPATIENT)
Dept: PHYSICAL THERAPY | Facility: HOSPITAL | Age: 74
Setting detail: THERAPIES SERIES
Discharge: HOME | End: 2019-05-14
Attending: ORTHOPAEDIC SURGERY

## 2019-05-14 DIAGNOSIS — G89.29 CHRONIC PAIN OF RIGHT HIP: ICD-10-CM
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
## 2019-05-14 DIAGNOSIS — M25.551 CHRONIC PAIN OF RIGHT HIP: ICD-10-CM

## 2019-05-14 DIAGNOSIS — M16.11 PRIMARY OSTEOARTHRITIS OF RIGHT HIP: Primary | ICD-10-CM

## 2019-05-14 PROCEDURE — G0283 ELEC STIM OTHER THAN WOUND: HCPCS

## 2019-05-14 PROCEDURE — 97140 MANUAL THERAPY 1/> REGIONS: CPT

## 2019-05-14 PROCEDURE — 97110 THERAPEUTIC EXERCISES: CPT

## 2019-05-14 NOTE — THERAPY TREATMENT NOTE
Outpatient Physical Therapy Ortho Treatment Note  [facility]     Patient Name: Brunilda Horner  : 1945  MRN: [id number]  Today's Date: 2019      Visit Date: 2019  Subjective Improvement: 75%  Visit Number:   3/3  Recert Date:   19  MD Visit:   Sx on R hip for KRISTIN 19  Total Approved Visits:  Medicare      Visit Dx:    ICD-10-CM ICD-9-CM   1. Primary osteoarthritis of right hip M16.11 715.15   2. Chronic pain of right hip M25.551 719.45    G89.29 338.29   3. Fall (on) (from) unspecified stairs and steps, subsequent encounter W10.9XXD V58.89     E880.9       Patient Active Problem List   Diagnosis   • Generalized anxiety disorder   • Chronic depression   • Chronic pain of right hip   • Bilateral primary osteoarthritis of hip   • Disorder of SI (sacroiliac) joint   • Primary osteoarthritis of right hip   • Other hyperlipidemia   • Essential hypertension        Past Medical History:   Diagnosis Date   • Anxiety     Generalized anxiety disorder    2015    • Blepharitis 2014   • Cellulitis of foot 2016   • Depression    • History of Papanicolaou smear of cervix 2009    negative   • Pure hypercholesterolemia 2015        Past Surgical History:   Procedure Laterality Date   • BREAST BIOPSY     • BREAST SURGERY  1994    Excision of mass, left breast. Mass, left breast   • COLONOSCOPY  2010    no polyps   • FOOT SURGERY  2001    Removal of tumor, radical resection of soft tissue, right foot. Recurrent plantar fibroma, plantar, right foot   • MAMMO STEREOTACTIC BREAST BIOPSY 1ST W WO DEVICE  1988    Needle localization and excision of micro-calcifications. Micro-calcifications, left breast   • SKIN BIOPSY  1991    Tangential excision keratoses left cheek. Exicison 25 papilloma, neck.       PT Ortho     Row Name 19 1300       Precautions and Contraindications    Precautions  significant OA R hip  -BB       Posture/Observations     Posture/Observations Comments  Gait with hurry cane.  Equal stride. Tenderness R Ischial tuberosity/Piriformis and SI region.  Alignment equal this date.    -BB      User Key  (r) = Recorded By, (t) = Taken By, (c) = Cosigned By    Initials Name Provider Type    Aby Alexander PTA Physical Therapy Assistant                      PT Assessment/Plan     Row Name 05/14/19 1300          PT Assessment    Assessment Comments  Maintained alignment since last visit. Continues with tenderness R post hip and SI region.  Independent with current HEP.  Added Clamshells to HEP.  Decreased use of RW.    -BB        PT Plan    PT Frequency  2x/week  -BB     Predicted Duration of Therapy Intervention (Therapy Eval)  x 4 weeks  -BB     PT Plan Comments  Continue with POC. Progress hip and core strength as able.  -BB       User Key  (r) = Recorded By, (t) = Taken By, (c) = Cosigned By    Initials Name Provider Type    Aby Alexander PTA Physical Therapy Assistant          Modalities     Row Name 05/14/19 1300             Ice    Ice Applied  Yes  -BB      Location  R post hip in L SL with IFC an dice  -BB      Rx Minutes  -- 20 min  -BB      Ice S/P Rx  Yes  -BB         ELECTRICAL STIMULATION    Attended/Unattended  Unattended  -BB      Stimulation Type  IFC  -BB      Location/Electrode Placement/Other  R post hip with ice x 20 min  -BB        User Key  (r) = Recorded By, (t) = Taken By, (c) = Cosigned By    Initials Name Provider Type    Aby Aelxander PTA Physical Therapy Assistant        Exercises     Row Name 05/14/19 1300             Subjective Comments    Subjective Comments  Doing better. Started using the cane a little Saturday. Gradually using it more. Used it the most today and hasn't hurt much at all. Was able to go a little while before taking a pain pill for the first time this morning. 75%   -BB         Subjective Pain    Able to rate subjective pain?  yes  -BB      Pre-Treatment Pain Level  1  -BB       Post-Treatment Pain Level  0  -BB         Exercise 1    Exercise Name 1  Pro II seat 10 strength and ROM  -BB      Time 1  10 min  -BB         Exercise 2    Exercise Name 2  Incline stretch  -BB      Reps 2  3  -BB      Time 2  30  -BB         Exercise 3    Exercise Name 3  HS Stretch  -BB      Reps 3  3  -BB      Time 3  30  -BB         Exercise 4    Exercise Name 4  Supine piriformis stretch  -BB      Reps 4  3  -BB      Time 4  30  -BB         Exercise 5    Exercise Name 5  clamshells  -BB      Sets 5  2  -BB      Reps 5  10  -BB      Additional Comments  R only  -BB         Exercise 6    Exercise Name 6  Manual see flowsheet  -BB      Time 6  10 min  -BB         Exercise 7    Exercise Name 7  IFC and ice see flowsheet  -BB        User Key  (r) = Recorded By, (t) = Taken By, (c) = Cosigned By    Initials Name Provider Type    Aby Alexander PTA Physical Therapy Assistant                      Manual Rx (last 36 hours)      Manual Treatments     Row Name 05/14/19 1300             Manual Rx 1    Manual Rx 1 Location  STM and MFR R piriformis and glute  -BB      Manual Rx 1 Duration  10 min in sidelying  -BB        User Key  (r) = Recorded By, (t) = Taken By, (c) = Cosigned By    Initials Name Provider Type    Aby Alexander PTA Physical Therapy Assistant          PT OP Goals     Row Name 05/14/19 1300          PT Short Term Goals    STG Date to Achieve  05/27/19  -BB     STG 1  Pt will be independent in HEP  -BB     STG 1 Progress  Progressing  -BB     STG 2  Pt will be able to walk with Min hip pain with the cane  -BB     STG 2 Progress  Not Met  -BB     STG 3  Pt will have AROM hip abd  20 deg  -BB     STG 3 Progress  Not Met  -BB     STG 4  Pt will have passive hip ext  20 degrees  -BB     STG 4 Progress  Not Met  -BB     STG 5  LEFS score of 25 or more  -BB     STG 5 Progress  Not Met  -BB        Long Term Goals    LTG Date to Achieve  06/04/19  -BB     LTG 1  Eccymosis from bruising resolved.   -BB     LTG 1 Progress  Not Met  -BB     LTG 2  Pain to decrease to 4/10 or less with walking  -BB     LTG 2 Progress  Not Met  -BB        Time Calculation    PT Goal Re-Cert Due Date  05/27/19  -BB       User Key  (r) = Recorded By, (t) = Taken By, (c) = Cosigned By    Initials Name Provider Type    Aby Alexander PTA Physical Therapy Assistant          Therapy Education  Given: HEP  Program: Reinforced              Time Calculation:   Start Time: 1300  Stop Time: 1410  Time Calculation (min): 70 min  Total Timed Code Minutes- PT: 45 minute(s)  Therapy Charges for Today     Code Description Service Date Service Provider Modifiers Qty    73040506726 HC PT MANUAL THERAPY EA 15 MIN 5/14/2019 Aby Sibley, ADAIR GP 1    52021605320 HC PT THER PROC EA 15 MIN 5/14/2019 Aby Sibley PTA GP 2    45830423982 HC PT ELECTRICAL STIM UNATTENDED 5/14/2019 Aby Sibley, ADAIR  1                    Aby Sibley PTA  5/14/2019

## 2019-05-15 ENCOUNTER — APPOINTMENT (OUTPATIENT)
Dept: PREADMISSION TESTING | Facility: HOSPITAL | Age: 74
End: 2019-05-15

## 2019-05-15 VITALS
DIASTOLIC BLOOD PRESSURE: 80 MMHG | RESPIRATION RATE: 16 BRPM | HEART RATE: 106 BPM | WEIGHT: 143 LBS | HEIGHT: 66 IN | OXYGEN SATURATION: 97 % | SYSTOLIC BLOOD PRESSURE: 156 MMHG | BODY MASS INDEX: 22.98 KG/M2

## 2019-05-15 DIAGNOSIS — M53.3 DISORDER OF SI (SACROILIAC) JOINT: ICD-10-CM

## 2019-05-15 DIAGNOSIS — M25.551 CHRONIC PAIN OF RIGHT HIP: ICD-10-CM

## 2019-05-15 DIAGNOSIS — G89.29 CHRONIC PAIN OF RIGHT HIP: ICD-10-CM

## 2019-05-15 DIAGNOSIS — M16.11 PRIMARY OSTEOARTHRITIS OF RIGHT HIP: ICD-10-CM

## 2019-05-15 LAB
ABO GROUP BLD: NORMAL
BLD GP AB SCN SERPL QL: NEGATIVE
DEPRECATED RDW RBC AUTO: 40.9 FL (ref 37–54)
ERYTHROCYTE [DISTWIDTH] IN BLOOD BY AUTOMATED COUNT: 12.4 % (ref 12.3–15.4)
HCT VFR BLD AUTO: 41.4 % (ref 34–46.6)
HGB BLD-MCNC: 13.9 G/DL (ref 12–15.9)
Lab: NORMAL
MCH RBC QN AUTO: 30 PG (ref 26.6–33)
MCHC RBC AUTO-ENTMCNC: 33.6 G/DL (ref 31.5–35.7)
MCV RBC AUTO: 89.2 FL (ref 79–97)
MRSA DNA SPEC QL NAA+PROBE: NEGATIVE
PLATELET # BLD AUTO: 542 10*3/MM3 (ref 140–450)
PMV BLD AUTO: 9.3 FL (ref 6–12)
RBC # BLD AUTO: 4.64 10*6/MM3 (ref 3.77–5.28)
RH BLD: POSITIVE
T&S EXPIRATION DATE: NORMAL
WBC NRBC COR # BLD: 8.27 10*3/MM3 (ref 3.4–10.8)

## 2019-05-15 PROCEDURE — 93010 ELECTROCARDIOGRAM REPORT: CPT | Performed by: INTERNAL MEDICINE

## 2019-05-15 PROCEDURE — 86850 RBC ANTIBODY SCREEN: CPT | Performed by: ORTHOPAEDIC SURGERY

## 2019-05-15 PROCEDURE — 86900 BLOOD TYPING SEROLOGIC ABO: CPT | Performed by: ORTHOPAEDIC SURGERY

## 2019-05-15 PROCEDURE — 93005 ELECTROCARDIOGRAM TRACING: CPT

## 2019-05-15 PROCEDURE — 86901 BLOOD TYPING SEROLOGIC RH(D): CPT | Performed by: ORTHOPAEDIC SURGERY

## 2019-05-15 PROCEDURE — 85027 COMPLETE CBC AUTOMATED: CPT | Performed by: ANESTHESIOLOGY

## 2019-05-15 PROCEDURE — 36415 COLL VENOUS BLD VENIPUNCTURE: CPT

## 2019-05-15 PROCEDURE — 87641 MR-STAPH DNA AMP PROBE: CPT | Performed by: ORTHOPAEDIC SURGERY

## 2019-05-15 RX ORDER — ATORVASTATIN CALCIUM 10 MG/1
10 TABLET, FILM COATED ORAL NIGHTLY
COMMUNITY
End: 2019-11-21 | Stop reason: SDUPTHER

## 2019-05-15 RX ORDER — SODIUM CHLORIDE, SODIUM GLUCONATE, SODIUM ACETATE, POTASSIUM CHLORIDE, AND MAGNESIUM CHLORIDE 526; 502; 368; 37; 30 MG/100ML; MG/100ML; MG/100ML; MG/100ML; MG/100ML
1000 INJECTION, SOLUTION INTRAVENOUS CONTINUOUS
Status: CANCELLED | OUTPATIENT
Start: 2019-05-29

## 2019-05-15 ASSESSMENT — HOOS JR
HOOS JR SCORE: 7
HOOS JR SCORE: 67.516

## 2019-05-15 NOTE — DISCHARGE INSTRUCTIONS
[facility]  Pre-op Information and Guidelines    You will be called after 2 p.m. the day before your surgery (Friday for Monday surgery) and notified of your time for arrival and approximate surgery time.  If you have not received a call by 4P.M., please contact Same Day Surgery at [phone] of if outside [facility] call [phone].    Please Follow these Important Safety Guidelines:    • The morning of your procedure, take only the medications listed below with   A sip of water:_____________________________________________       ______________________________________________    • DO NOT eat or drink anything after 12:00 midnight the night before surgery  Specific instructions concerning drinking clear liquids will be discussed during  the pre-surgery instruction call the day before your surgery.    • If you take a blood thinner (ex. Plavix, Coumadin, aspirin), ask your doctor when to stop it before surgery  STOP DATE: _________________    • Only 2 visitors are allowed in patient rooms at a time  Your visitors will be asked to wait in the lobby until the admission process is complete with the exception of a parent with a child and patients in need of special assistance.    • YOU CANNOT DRIVE YOURSELF HOME  You must be accompanied by someone who will be responsible for driving you home after surgery and for your care at home.    • DO NOT chew gum, use breath mints, hard candy, or smoke the day of surgery  • DO NOT drink alcohol for at least 24 hours before your surgery  • DO NOT wear any jewelry and remove all body piercing before coming to the hospital  • DO NOT wear make-up to the hospital  • If you are having surgery on an extremity (arm/leg/foot) remove nail polish/artificial nails on the surgical side  • Clothing, glasses, contacts, dentures, and hairpieces must be removed before surgery  • Bathe the night before or the morning of your surgery and do not use powders/lotions on  skin.

## 2019-05-16 ENCOUNTER — HOSPITAL ENCOUNTER (OUTPATIENT)
Dept: PHYSICAL THERAPY | Facility: HOSPITAL | Age: 74
Setting detail: THERAPIES SERIES
Discharge: HOME | End: 2019-05-16
Attending: ORTHOPAEDIC SURGERY

## 2019-05-16 DIAGNOSIS — G89.29 CHRONIC PAIN OF RIGHT HIP: ICD-10-CM
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
## 2019-05-16 DIAGNOSIS — M16.11 PRIMARY OSTEOARTHRITIS OF RIGHT HIP: Primary | ICD-10-CM

## 2019-05-16 DIAGNOSIS — M25.551 CHRONIC PAIN OF RIGHT HIP: ICD-10-CM

## 2019-05-16 PROCEDURE — G0283 ELEC STIM OTHER THAN WOUND: HCPCS

## 2019-05-16 PROCEDURE — 97110 THERAPEUTIC EXERCISES: CPT

## 2019-05-16 PROCEDURE — 97140 MANUAL THERAPY 1/> REGIONS: CPT

## 2019-05-16 NOTE — THERAPY TREATMENT NOTE
Outpatient Physical Therapy Ortho Treatment Note  [facility]     Patient Name: Brunilda Horner  : 1945  MRN: [id number]  Today's Date: 2019      Visit Date: 2019    Subjective Improvement: 85%  Visit Number:      Recert Date:   19  MD Visit:   Sx R hip to KRISTIN 19  Total Approved Visits:  Medicare      Visit Dx:    ICD-10-CM ICD-9-CM   1. Primary osteoarthritis of right hip M16.11 715.15   2. Chronic pain of right hip M25.551 719.45    G89.29 338.29   3. Fall (on) (from) unspecified stairs and steps, subsequent encounter W10.9XXD V58.89     E880.9       Patient Active Problem List   Diagnosis   • Generalized anxiety disorder   • Chronic depression   • Chronic pain of right hip   • Bilateral primary osteoarthritis of hip   • Disorder of SI (sacroiliac) joint   • Primary osteoarthritis of right hip   • Other hyperlipidemia   • Essential hypertension        Past Medical History:   Diagnosis Date   • Anxiety     Generalized anxiety disorder    2015    • Arthritis    • Blepharitis 2014   • Cellulitis of foot 2016   • Depression    • History of Papanicolaou smear of cervix 2009    negative   • Hypertension    • Osteopenia    • Pure hypercholesterolemia 2015        Past Surgical History:   Procedure Laterality Date   • BREAST BIOPSY     • BREAST SURGERY  1994    Excision of mass, left breast. Mass, left breast   • COLONOSCOPY  2010    no polyps   • FOOT SURGERY  2001    Removal of tumor, radical resection of soft tissue, right foot. Recurrent plantar fibroma, plantar, right foot   • MAMMO STEREOTACTIC BREAST BIOPSY 1ST W WO DEVICE  1988    Needle localization and excision of micro-calcifications. Micro-calcifications, left breast   • SKIN BIOPSY  1991    Tangential excision keratoses left cheek. Exicison 25 papilloma, neck.       PT Ortho     Row Name 19 1200       Precautions and Contraindications    Precautions   significant OA R hip  -BB       Posture/Observations    Posture/Observations Comments  Gait with hurry cane.  Equal stride and good heel to toe. Able to demo short distances with min antalgia and no LOB. Tenderness and soft tissue tightness R glute/piriformis and around R ischial tuberosity.    -BB    Row Name 05/14/19 1300       Precautions and Contraindications    Precautions  significant OA R hip  -BB       Posture/Observations    Posture/Observations Comments  Gait with hurry cane.  Equal stride. Tenderness R Ischial tuberosity/Piriformis and SI region.  Alignment equal this date.    -BB      User Key  (r) = Recorded By, (t) = Taken By, (c) = Cosigned By    Initials Name Provider Type    Aby Alexander PTA Physical Therapy Assistant                      PT Assessment/Plan     Row Name 05/16/19 1306          PT Assessment    Assessment Comments  Independent and compliant with current HEP. Pt feels she is ready to attempt independent management for hip pain from fall and wishes to resume PT after KRISTIN.  Pt has made great progress since initiating PT.   -BB        PT Plan    Predicted Duration of Therapy Intervention (Therapy Eval)  PRN prior to surgery  -BB     PT Plan Comments  PRN until hip surgery on 5/29/19 then continue per MD orders.  -BB       User Key  (r) = Recorded By, (t) = Taken By, (c) = Cosigned By    Initials Name Provider Type    Aby Alexander PTA Physical Therapy Assistant          Modalities     Row Name 05/16/19 1200             Ice    Ice Applied  Yes  -BB      Location  R post hip in L sidelying with IFC  -BB      Rx Minutes  -- 20  -BB      Ice S/P Rx  Yes  -BB         ELECTRICAL STIMULATION    Attended/Unattended  Unattended  -BB      Stimulation Type  IFC  -BB      Location/Electrode Placement/Other  R Post Hip with ice x 20 min  -BB        User Key  (r) = Recorded By, (t) = Taken By, (c) = Cosigned By    Initials Name Provider Type    Aby Alexander PTA Physical  Therapy Assistant        Exercises     Row Name 05/16/19 1200             Subjective Comments    Subjective Comments  Pt states she has been using the cane more and mostly going without it around the house today.  Feels she is ready to try independent management prior sx  -BB         Subjective Pain    Able to rate subjective pain?  yes  -BB      Pre-Treatment Pain Level  -- .5/10  -BB      Post-Treatment Pain Level  1  -BB         Exercise 1    Exercise Name 1  Pro II level 2.5 seat 8 strength and ROM  -BB      Time 1  10  -BB         Exercise 2    Exercise Name 2  Incline stretch  -BB      Reps 2  3  -BB      Time 2  30  -BB         Exercise 3    Exercise Name 3  HS stretch  -BB      Reps 3  3  -BB      Time 3  30  -BB      Additional Comments  R only due to discomfort with FWB R  -BB         Exercise 4    Exercise Name 4  standing CR/TR  -BB      Sets 4  2  -BB      Reps 4  10  -BB         Exercise 5    Exercise Name 5  Iso ham pull into ball.  -BB      Sets 5  2  -BB      Reps 5  10  -BB      Time 5  3-5 seconds  -BB         Exercise 6    Exercise Name 6  add sq with alt LAQ  -BB      Sets 6  2  -BB      Reps 6  10  -BB         Exercise 7    Exercise Name 7  Gentle Passive stretch to R hip flexor in L SL  -BB      Reps 7  3  -BB      Time 7  30  -BB         Exercise 8    Exercise Name 8  Manual see flowsheet  -BB      Time 8  10  -BB         Exercise 9    Exercise Name 9  IFC and ice see flowsheet  -BB      Time 9  20  -BB        User Key  (r) = Recorded By, (t) = Taken By, (c) = Cosigned By    Initials Name Provider Type    Aby Alexander, PTA Physical Therapy Assistant                      Manual Rx (last 36 hours)      Manual Treatments     Row Name 05/16/19 1300             Manual Rx 1    Manual Rx 1 Location  STM and MFR around R Ischial Tuberosity  -BB      Manual Rx 1 Duration  10 min in sidelying  -BB        User Key  (r) = Recorded By, (t) = Taken By, (c) = Cosigned By    Initials Name Provider  Type    BB Aby Sibley PTA Physical Therapy Assistant          PT OP Goals     Row Name 05/16/19 1300          PT Short Term Goals    STG Date to Achieve  05/27/19  -BB     STG 1  Pt will be independent in HEP  -BB     STG 1 Progress  Met  -BB     STG 2  Pt will be able to walk with Min hip pain with the cane  -BB     STG 2 Progress  Met  -BB     STG 3  Pt will have AROM hip abd  20 deg  -BB     STG 3 Progress  Met  -BB     STG 3 Progress Comments  20 degrees   -BB     STG 4  Pt will have passive hip ext  20 degrees  -BB     STG 4 Progress  Not Met  -BB     STG 4 Progress Comments  8 degrees hip ext PROM in sidelying  -BB     STG 5  LEFS score of 25 or more  -BB     STG 5 Progress  Not Met  -BB        Long Term Goals    LTG Date to Achieve  06/04/19  -BB     LTG 1  Eccymosis from bruising resolved.  -BB     LTG 1 Progress  Met  -BB     LTG 2  Pain to decrease to 4/10 or less with walking  -BB     LTG 2 Progress  Met  -BB        Time Calculation    PT Goal Re-Cert Due Date  05/27/19  -BB       User Key  (r) = Recorded By, (t) = Taken By, (c) = Cosigned By    Initials Name Provider Type    Aby Alexander PTA Physical Therapy Assistant          Therapy Education  Given: HEP  Program: Reinforced              Time Calculation:   Start Time: 1258  Stop Time: 1410  Time Calculation (min): 72 min  Total Timed Code Minutes- PT: 40 minute(s)  Therapy Charges for Today     Code Description Service Date Service Provider Modifiers Qty    11063337730 HC PT THER PROC EA 15 MIN 5/16/2019 Aby Sibley PTA GP 2    42390644909 HC PT MANUAL THERAPY EA 15 MIN 5/16/2019 Aby Sibley PTA GP 1    88939762829 HC PT ELECTRICAL STIM UNATTENDED 5/16/2019 Aby Sibley PTA  1                    Aby Sibley PTA  5/16/2019

## 2019-05-21 ENCOUNTER — APPOINTMENT (OUTPATIENT)
Dept: PHYSICAL THERAPY | Facility: HOSPITAL | Age: 74
End: 2019-05-21
Attending: ORTHOPAEDIC SURGERY

## 2019-05-23 ENCOUNTER — APPOINTMENT (OUTPATIENT)
Dept: PHYSICAL THERAPY | Facility: HOSPITAL | Age: 74
End: 2019-05-23
Attending: ORTHOPAEDIC SURGERY

## 2019-05-29 ENCOUNTER — HOSPITAL ENCOUNTER (INPATIENT)
Facility: HOSPITAL | Age: 74
LOS: 1 days | Discharge: HOME | End: 2019-05-30
Attending: ORTHOPAEDIC SURGERY | Admitting: ORTHOPAEDIC SURGERY

## 2019-05-29 ENCOUNTER — ANESTHESIA EVENT (OUTPATIENT)
Dept: PERIOP | Facility: HOSPITAL | Age: 74
End: 2019-05-29

## 2019-05-29 ENCOUNTER — APPOINTMENT (OUTPATIENT)
Dept: GENERAL RADIOLOGY | Facility: HOSPITAL | Age: 74
End: 2019-05-29

## 2019-05-29 ENCOUNTER — ANESTHESIA (OUTPATIENT)
Dept: PERIOP | Facility: HOSPITAL | Age: 74
End: 2019-05-29

## 2019-05-29 DIAGNOSIS — Z74.09 IMPAIRED FUNCTIONAL MOBILITY, BALANCE, GAIT, AND ENDURANCE: ICD-10-CM

## 2019-05-29 DIAGNOSIS — M16.11 PRIMARY OSTEOARTHRITIS OF RIGHT HIP: Primary | ICD-10-CM

## 2019-05-29 DIAGNOSIS — Z78.9 IMPAIRED MOBILITY AND ADLS: ICD-10-CM

## 2019-05-29 DIAGNOSIS — Z74.09 IMPAIRED MOBILITY AND ADLS: ICD-10-CM

## 2019-05-29 DIAGNOSIS — I10 ESSENTIAL HYPERTENSION: Chronic | ICD-10-CM

## 2019-05-29 DIAGNOSIS — F41.1 GENERALIZED ANXIETY DISORDER: Chronic | ICD-10-CM

## 2019-05-29 DIAGNOSIS — M53.3 DISORDER OF SI (SACROILIAC) JOINT: ICD-10-CM

## 2019-05-29 DIAGNOSIS — M25.551 CHRONIC PAIN OF RIGHT HIP: ICD-10-CM

## 2019-05-29 DIAGNOSIS — G89.29 CHRONIC PAIN OF RIGHT HIP: ICD-10-CM

## 2019-05-29 LAB
ABO GROUP BLD: NORMAL
BLD GP AB SCN SERPL QL: NEGATIVE
HCT VFR BLD AUTO: 34.2 % (ref 34–46.6)
HGB BLD-MCNC: 11.4 G/DL (ref 12–15.9)
Lab: NORMAL
RH BLD: POSITIVE
T&S EXPIRATION DATE: NORMAL

## 2019-05-29 PROCEDURE — C1776 JOINT DEVICE (IMPLANTABLE): HCPCS | Performed by: ORTHOPAEDIC SURGERY

## 2019-05-29 PROCEDURE — 0SR90JZ REPLACEMENT OF RIGHT HIP JOINT WITH SYNTHETIC SUBSTITUTE, OPEN APPROACH: ICD-10-PCS | Performed by: ORTHOPAEDIC SURGERY

## 2019-05-29 PROCEDURE — 88311 DECALCIFY TISSUE: CPT | Performed by: ORTHOPAEDIC SURGERY

## 2019-05-29 PROCEDURE — 88305 TISSUE EXAM BY PATHOLOGIST: CPT | Performed by: PATHOLOGY

## 2019-05-29 PROCEDURE — 25010000002 FENTANYL CITRATE (PF) 100 MCG/2ML SOLUTION: Performed by: NURSE ANESTHETIST, CERTIFIED REGISTERED

## 2019-05-29 PROCEDURE — 85018 HEMOGLOBIN: CPT | Performed by: ORTHOPAEDIC SURGERY

## 2019-05-29 PROCEDURE — 86850 RBC ANTIBODY SCREEN: CPT | Performed by: ANESTHESIOLOGY

## 2019-05-29 PROCEDURE — 25010000002 HYDROMORPHONE 1 MG/ML SOLUTION: Performed by: NURSE ANESTHETIST, CERTIFIED REGISTERED

## 2019-05-29 PROCEDURE — 85014 HEMATOCRIT: CPT | Performed by: ORTHOPAEDIC SURGERY

## 2019-05-29 PROCEDURE — 25010000002 PHENYLEPHRINE PER 1 ML: Performed by: NURSE ANESTHETIST, CERTIFIED REGISTERED

## 2019-05-29 PROCEDURE — 97166 OT EVAL MOD COMPLEX 45 MIN: CPT

## 2019-05-29 PROCEDURE — 94799 UNLISTED PULMONARY SVC/PX: CPT

## 2019-05-29 PROCEDURE — 88305 TISSUE EXAM BY PATHOLOGIST: CPT | Performed by: ORTHOPAEDIC SURGERY

## 2019-05-29 PROCEDURE — 86901 BLOOD TYPING SEROLOGIC RH(D): CPT | Performed by: ANESTHESIOLOGY

## 2019-05-29 PROCEDURE — 25010000002 DEXAMETHASONE PER 1 MG: Performed by: NURSE ANESTHETIST, CERTIFIED REGISTERED

## 2019-05-29 PROCEDURE — 25010000002 ONDANSETRON PER 1 MG: Performed by: NURSE ANESTHETIST, CERTIFIED REGISTERED

## 2019-05-29 PROCEDURE — 88311 DECALCIFY TISSUE: CPT | Performed by: PATHOLOGY

## 2019-05-29 PROCEDURE — 25010000002 PROPOFOL 1000 MG/ML EMULSION: Performed by: NURSE ANESTHETIST, CERTIFIED REGISTERED

## 2019-05-29 PROCEDURE — 76000 FLUOROSCOPY <1 HR PHYS/QHP: CPT

## 2019-05-29 PROCEDURE — 25010000002 CEFAZOLIN PER 500 MG: Performed by: ORTHOPAEDIC SURGERY

## 2019-05-29 PROCEDURE — 27130 TOTAL HIP ARTHROPLASTY: CPT | Performed by: ORTHOPAEDIC SURGERY

## 2019-05-29 PROCEDURE — 86900 BLOOD TYPING SEROLOGIC ABO: CPT | Performed by: ANESTHESIOLOGY

## 2019-05-29 PROCEDURE — 97162 PT EVAL MOD COMPLEX 30 MIN: CPT

## 2019-05-29 DEVICE — TOTL HIP COP DEPUY 9641334: Type: IMPLANTABLE DEVICE | Site: HIP | Status: FUNCTIONAL

## 2019-05-29 DEVICE — PINNACLE GRIPTION ACETABULAR SHELL SECTOR 52MM OD
Type: IMPLANTABLE DEVICE | Site: HIP | Status: FUNCTIONAL
Brand: PINNACLE GRIPTION

## 2019-05-29 DEVICE — PINNACLE HIP SOLUTIONS ALTRX POLYETHYLENE ACETABULAR LINER +4 NEUTRAL 36MM ID 52MM OD
Type: IMPLANTABLE DEVICE | Site: HIP | Status: FUNCTIONAL
Brand: PINNACLE ALTRX

## 2019-05-29 DEVICE — BIOLOX DELTA CERAMIC FEMORAL HEAD +1.5 36MM DIA 12/14 TAPER
Type: IMPLANTABLE DEVICE | Site: HIP | Status: FUNCTIONAL
Brand: BIOLOX DELTA

## 2019-05-29 DEVICE — CORAIL HIP SYSTEM CEMENTLESS FEMORAL STEM 12/14 AMT 135 DEGREES KA SIZE 12 HA COATED STANDARD COLLAR
Type: IMPLANTABLE DEVICE | Site: HIP | Status: FUNCTIONAL
Brand: CORAIL

## 2019-05-29 DEVICE — PINNACLE CANCELLOUS BONE SCREW 6.5MM X 30MM
Type: IMPLANTABLE DEVICE | Site: HIP | Status: FUNCTIONAL
Brand: PINNACLE

## 2019-05-29 RX ORDER — ACETAMINOPHEN 500 MG
1000 TABLET ORAL ONCE
Status: COMPLETED | OUTPATIENT
Start: 2019-05-29 | End: 2019-05-29

## 2019-05-29 RX ORDER — FAMOTIDINE 40 MG/1
40 TABLET, FILM COATED ORAL DAILY
Status: DISCONTINUED | OUTPATIENT
Start: 2019-05-29 | End: 2019-05-30 | Stop reason: HOSPADM

## 2019-05-29 RX ORDER — OXYCODONE HYDROCHLORIDE 5 MG/1
5 TABLET ORAL EVERY 4 HOURS PRN
Status: DISCONTINUED | OUTPATIENT
Start: 2019-05-29 | End: 2019-05-30 | Stop reason: HOSPADM

## 2019-05-29 RX ORDER — WARFARIN SODIUM 2.5 MG/1
2.5 TABLET ORAL
Status: DISCONTINUED | OUTPATIENT
Start: 2019-05-29 | End: 2019-05-30 | Stop reason: HOSPADM

## 2019-05-29 RX ORDER — LIDOCAINE HYDROCHLORIDE 10 MG/ML
INJECTION, SOLUTION INFILTRATION; PERINEURAL AS NEEDED
Status: DISCONTINUED | OUTPATIENT
Start: 2019-05-29 | End: 2019-05-29 | Stop reason: SURG

## 2019-05-29 RX ORDER — BACITRACIN 50000 [IU]/1
INJECTION, POWDER, FOR SOLUTION INTRAMUSCULAR AS NEEDED
Status: DISCONTINUED | OUTPATIENT
Start: 2019-05-29 | End: 2019-05-30 | Stop reason: HOSPADM

## 2019-05-29 RX ORDER — NALOXONE HCL 0.4 MG/ML
0.1 VIAL (ML) INJECTION
Status: DISCONTINUED | OUTPATIENT
Start: 2019-05-29 | End: 2019-05-30 | Stop reason: HOSPADM

## 2019-05-29 RX ORDER — FERROUS SULFATE TAB EC 324 MG (65 MG FE EQUIVALENT) 324 (65 FE) MG
325 TABLET DELAYED RESPONSE ORAL
Status: DISCONTINUED | OUTPATIENT
Start: 2019-05-30 | End: 2019-05-30 | Stop reason: HOSPADM

## 2019-05-29 RX ORDER — FENTANYL CITRATE 50 UG/ML
INJECTION, SOLUTION INTRAMUSCULAR; INTRAVENOUS AS NEEDED
Status: DISCONTINUED | OUTPATIENT
Start: 2019-05-29 | End: 2019-05-29 | Stop reason: SURG

## 2019-05-29 RX ORDER — BUPIVACAINE HCL/0.9 % NACL/PF 0.1 %
2 PLASTIC BAG, INJECTION (ML) EPIDURAL ONCE
Status: COMPLETED | OUTPATIENT
Start: 2019-05-29 | End: 2019-05-29

## 2019-05-29 RX ORDER — SODIUM CHLORIDE 9 MG/ML
100 INJECTION, SOLUTION INTRAVENOUS CONTINUOUS
Status: DISCONTINUED | OUTPATIENT
Start: 2019-05-29 | End: 2019-05-30 | Stop reason: HOSPADM

## 2019-05-29 RX ORDER — TEMAZEPAM 15 MG/1
30 CAPSULE ORAL NIGHTLY PRN
Status: DISCONTINUED | OUTPATIENT
Start: 2019-05-29 | End: 2019-05-30 | Stop reason: HOSPADM

## 2019-05-29 RX ORDER — ESCITALOPRAM OXALATE 10 MG/1
10 TABLET ORAL DAILY
Status: DISCONTINUED | OUTPATIENT
Start: 2019-05-30 | End: 2019-05-30 | Stop reason: HOSPADM

## 2019-05-29 RX ORDER — ONDANSETRON 2 MG/ML
4 INJECTION INTRAMUSCULAR; INTRAVENOUS EVERY 6 HOURS PRN
Status: DISCONTINUED | OUTPATIENT
Start: 2019-05-29 | End: 2019-05-30 | Stop reason: HOSPADM

## 2019-05-29 RX ORDER — OXYCODONE HCL 10 MG/1
10 TABLET, FILM COATED, EXTENDED RELEASE ORAL ONCE
Status: COMPLETED | OUTPATIENT
Start: 2019-05-29 | End: 2019-05-29

## 2019-05-29 RX ORDER — MELOXICAM 15 MG/1
15 TABLET ORAL ONCE
Status: COMPLETED | OUTPATIENT
Start: 2019-05-29 | End: 2019-05-29

## 2019-05-29 RX ORDER — CLINDAMYCIN PHOSPHATE 600 MG/50ML
600 INJECTION INTRAVENOUS EVERY 8 HOURS
Status: COMPLETED | OUTPATIENT
Start: 2019-05-29 | End: 2019-05-29

## 2019-05-29 RX ORDER — ACETAMINOPHEN 500 MG
1000 TABLET ORAL 4 TIMES DAILY
Status: DISCONTINUED | OUTPATIENT
Start: 2019-05-29 | End: 2019-05-30 | Stop reason: HOSPADM

## 2019-05-29 RX ORDER — ONDANSETRON 4 MG/1
4 TABLET, FILM COATED ORAL EVERY 6 HOURS PRN
Status: DISCONTINUED | OUTPATIENT
Start: 2019-05-29 | End: 2019-05-30 | Stop reason: HOSPADM

## 2019-05-29 RX ORDER — BUPROPION HYDROCHLORIDE 150 MG/1
300 TABLET ORAL DAILY
Status: DISCONTINUED | OUTPATIENT
Start: 2019-05-30 | End: 2019-05-30 | Stop reason: HOSPADM

## 2019-05-29 RX ORDER — ATORVASTATIN CALCIUM 10 MG/1
10 TABLET, FILM COATED ORAL NIGHTLY
Status: DISCONTINUED | OUTPATIENT
Start: 2019-05-29 | End: 2019-05-30 | Stop reason: HOSPADM

## 2019-05-29 RX ORDER — ONDANSETRON 2 MG/ML
INJECTION INTRAMUSCULAR; INTRAVENOUS AS NEEDED
Status: DISCONTINUED | OUTPATIENT
Start: 2019-05-29 | End: 2019-05-29 | Stop reason: SURG

## 2019-05-29 RX ORDER — BUPIVACAINE HYDROCHLORIDE 7.5 MG/ML
INJECTION, SOLUTION EPIDURAL; RETROBULBAR
Status: COMPLETED | OUTPATIENT
Start: 2019-05-29 | End: 2019-05-29

## 2019-05-29 RX ORDER — 0.9 % SODIUM CHLORIDE 0.9 %
VIAL (ML) INJECTION AS NEEDED
Status: DISCONTINUED | OUTPATIENT
Start: 2019-05-29 | End: 2019-05-30 | Stop reason: HOSPADM

## 2019-05-29 RX ORDER — SENNA AND DOCUSATE SODIUM 50; 8.6 MG/1; MG/1
2 TABLET, FILM COATED ORAL 2 TIMES DAILY
Status: DISCONTINUED | OUTPATIENT
Start: 2019-05-29 | End: 2019-05-30 | Stop reason: HOSPADM

## 2019-05-29 RX ORDER — BUPIVACAINE HCL/0.9 % NACL/PF 0.1 %
2 PLASTIC BAG, INJECTION (ML) EPIDURAL EVERY 8 HOURS
Status: COMPLETED | OUTPATIENT
Start: 2019-05-29 | End: 2019-05-30

## 2019-05-29 RX ORDER — HYDROMORPHONE HCL 110MG/55ML
0.5 PATIENT CONTROLLED ANALGESIA SYRINGE INTRAVENOUS
Status: DISCONTINUED | OUTPATIENT
Start: 2019-05-29 | End: 2019-05-29

## 2019-05-29 RX ORDER — DEXAMETHASONE SODIUM PHOSPHATE 4 MG/ML
INJECTION, SOLUTION INTRA-ARTICULAR; INTRALESIONAL; INTRAMUSCULAR; INTRAVENOUS; SOFT TISSUE AS NEEDED
Status: DISCONTINUED | OUTPATIENT
Start: 2019-05-29 | End: 2019-05-29 | Stop reason: SURG

## 2019-05-29 RX ORDER — TIZANIDINE 4 MG/1
4 TABLET ORAL NIGHTLY PRN
Status: DISCONTINUED | OUTPATIENT
Start: 2019-05-29 | End: 2019-05-30 | Stop reason: HOSPADM

## 2019-05-29 RX ORDER — SODIUM CHLORIDE 0.9 % (FLUSH) 0.9 %
3-10 SYRINGE (ML) INJECTION AS NEEDED
Status: DISCONTINUED | OUTPATIENT
Start: 2019-05-29 | End: 2019-05-30 | Stop reason: HOSPADM

## 2019-05-29 RX ORDER — NALOXONE HCL 0.4 MG/ML
0.1 VIAL (ML) INJECTION
Status: DISCONTINUED | OUTPATIENT
Start: 2019-05-29 | End: 2019-05-29

## 2019-05-29 RX ORDER — LISINOPRIL 10 MG/1
10 TABLET ORAL DAILY
Status: DISCONTINUED | OUTPATIENT
Start: 2019-05-29 | End: 2019-05-30 | Stop reason: HOSPADM

## 2019-05-29 RX ORDER — SODIUM CHLORIDE 0.9 % (FLUSH) 0.9 %
3 SYRINGE (ML) INJECTION EVERY 12 HOURS SCHEDULED
Status: DISCONTINUED | OUTPATIENT
Start: 2019-05-29 | End: 2019-05-30 | Stop reason: HOSPADM

## 2019-05-29 RX ORDER — SODIUM CHLORIDE, SODIUM GLUCONATE, SODIUM ACETATE, POTASSIUM CHLORIDE, AND MAGNESIUM CHLORIDE 526; 502; 368; 37; 30 MG/100ML; MG/100ML; MG/100ML; MG/100ML; MG/100ML
1000 INJECTION, SOLUTION INTRAVENOUS CONTINUOUS
Status: DISCONTINUED | OUTPATIENT
Start: 2019-05-29 | End: 2019-05-30 | Stop reason: HOSPADM

## 2019-05-29 RX ORDER — OXYCODONE HCL 10 MG/1
10 TABLET, FILM COATED, EXTENDED RELEASE ORAL EVERY 12 HOURS SCHEDULED
Status: DISCONTINUED | OUTPATIENT
Start: 2019-05-29 | End: 2019-05-30 | Stop reason: HOSPADM

## 2019-05-29 RX ADMIN — SODIUM CHLORIDE 100 ML/HR: 9 INJECTION, SOLUTION INTRAVENOUS at 22:42

## 2019-05-29 RX ADMIN — FENTANYL CITRATE 25 MCG: 50 INJECTION, SOLUTION INTRAMUSCULAR; INTRAVENOUS at 10:53

## 2019-05-29 RX ADMIN — OXYCODONE HYDROCHLORIDE 5 MG: 5 TABLET ORAL at 22:42

## 2019-05-29 RX ADMIN — PHENYLEPHRINE HYDROCHLORIDE 100 MCG: 10 INJECTION INTRAVENOUS at 11:18

## 2019-05-29 RX ADMIN — ONDANSETRON 4 MG: 2 INJECTION INTRAMUSCULAR; INTRAVENOUS at 12:15

## 2019-05-29 RX ADMIN — PHENYLEPHRINE HYDROCHLORIDE 100 MCG: 10 INJECTION INTRAVENOUS at 10:59

## 2019-05-29 RX ADMIN — Medication 2 G: at 11:05

## 2019-05-29 RX ADMIN — PHENYLEPHRINE HYDROCHLORIDE 100 MCG: 10 INJECTION INTRAVENOUS at 11:39

## 2019-05-29 RX ADMIN — SODIUM CHLORIDE 100 ML/HR: 9 INJECTION, SOLUTION INTRAVENOUS at 16:02

## 2019-05-29 RX ADMIN — CLINDAMYCIN IN 5 PERCENT DEXTROSE 600 MG: 12 INJECTION, SOLUTION INTRAVENOUS at 16:04

## 2019-05-29 RX ADMIN — SENNOSIDES AND DOCUSATE SODIUM 2 TABLET: 8.6; 5 TABLET ORAL at 20:29

## 2019-05-29 RX ADMIN — HYDROMORPHONE HYDROCHLORIDE 0.25 MG: 1 INJECTION, SOLUTION INTRAMUSCULAR; INTRAVENOUS; SUBCUTANEOUS at 13:49

## 2019-05-29 RX ADMIN — HYDROMORPHONE HYDROCHLORIDE 0.25 MG: 1 INJECTION, SOLUTION INTRAMUSCULAR; INTRAVENOUS; SUBCUTANEOUS at 13:34

## 2019-05-29 RX ADMIN — OXYCODONE HYDROCHLORIDE 5 MG: 5 TABLET ORAL at 16:09

## 2019-05-29 RX ADMIN — SODIUM CHLORIDE 2 G: 9 INJECTION, SOLUTION INTRAVENOUS at 18:01

## 2019-05-29 RX ADMIN — SODIUM CHLORIDE, SODIUM GLUCONATE, SODIUM ACETATE, POTASSIUM CHLORIDE, AND MAGNESIUM CHLORIDE 1000 ML: 526; 502; 368; 37; 30 INJECTION, SOLUTION INTRAVENOUS at 08:56

## 2019-05-29 RX ADMIN — ATORVASTATIN CALCIUM 10 MG: 10 TABLET, FILM COATED ORAL at 20:29

## 2019-05-29 RX ADMIN — FENTANYL CITRATE 25 MCG: 50 INJECTION, SOLUTION INTRAMUSCULAR; INTRAVENOUS at 12:11

## 2019-05-29 RX ADMIN — ACETAMINOPHEN 1000 MG: 500 TABLET ORAL at 08:57

## 2019-05-29 RX ADMIN — OXYCODONE HYDROCHLORIDE 10 MG: 10 TABLET, FILM COATED, EXTENDED RELEASE ORAL at 08:57

## 2019-05-29 RX ADMIN — FAMOTIDINE 40 MG: 40 TABLET ORAL at 16:04

## 2019-05-29 RX ADMIN — DEXAMETHASONE SODIUM PHOSPHATE 4 MG: 4 INJECTION, SOLUTION INTRAMUSCULAR; INTRAVENOUS at 11:13

## 2019-05-29 RX ADMIN — LIDOCAINE HYDROCHLORIDE 50 MG: 10 INJECTION, SOLUTION INFILTRATION; PERINEURAL at 10:57

## 2019-05-29 RX ADMIN — FENTANYL CITRATE 25 MCG: 50 INJECTION, SOLUTION INTRAMUSCULAR; INTRAVENOUS at 11:25

## 2019-05-29 RX ADMIN — ACETAMINOPHEN 1000 MG: 500 TABLET ORAL at 17:56

## 2019-05-29 RX ADMIN — PROPOFOL 125 MCG/KG/MIN: 10 INJECTION, EMULSION INTRAVENOUS at 10:57

## 2019-05-29 RX ADMIN — TRANEXAMIC ACID 1000 MG: 100 INJECTION, SOLUTION INTRAVENOUS at 08:56

## 2019-05-29 RX ADMIN — ACETAMINOPHEN 1000 MG: 500 TABLET ORAL at 20:29

## 2019-05-29 RX ADMIN — FENTANYL CITRATE 25 MCG: 50 INJECTION, SOLUTION INTRAMUSCULAR; INTRAVENOUS at 10:57

## 2019-05-29 RX ADMIN — WARFARIN SODIUM 2.5 MG: 2.5 TABLET ORAL at 17:56

## 2019-05-29 RX ADMIN — TRANEXAMIC ACID 1000 MG: 100 INJECTION, SOLUTION INTRAVENOUS at 12:23

## 2019-05-29 RX ADMIN — MELOXICAM 15 MG: 15 TABLET ORAL at 08:57

## 2019-05-29 RX ADMIN — TIZANIDINE 4 MG: 4 TABLET ORAL at 22:42

## 2019-05-29 RX ADMIN — CLINDAMYCIN IN 5 PERCENT DEXTROSE 600 MG: 12 INJECTION, SOLUTION INTRAVENOUS at 22:29

## 2019-05-29 RX ADMIN — OXYCODONE HYDROCHLORIDE 10 MG: 10 TABLET, FILM COATED, EXTENDED RELEASE ORAL at 20:29

## 2019-05-29 RX ADMIN — PHENYLEPHRINE HYDROCHLORIDE 100 MCG: 10 INJECTION INTRAVENOUS at 11:10

## 2019-05-29 RX ADMIN — BUPIVACAINE HYDROCHLORIDE 1.8 ML: 7.5 INJECTION, SOLUTION EPIDURAL; RETROBULBAR at 10:55

## 2019-05-29 RX ADMIN — HYDROMORPHONE HYDROCHLORIDE 0.25 MG: 1 INJECTION, SOLUTION INTRAMUSCULAR; INTRAVENOUS; SUBCUTANEOUS at 14:04

## 2019-05-29 RX ADMIN — PHENYLEPHRINE HYDROCHLORIDE 100 MCG: 10 INJECTION INTRAVENOUS at 11:03

## 2019-05-29 NOTE — ANESTHESIA POSTPROCEDURE EVALUATION
Patient: Brunilda Horner    Procedure Summary     Date:  05/29/19 Room / Location:  [facility] OR 11 / [facility] OR    Anesthesia Start:  1043 Anesthesia Stop:  1303    Procedure:  TOTAL HIP ARTHROPLASTY ANTERIOR - right (Right Hip) Diagnosis:       Primary osteoarthritis of right hip      Disorder of SI (sacroiliac) joint      Chronic pain of right hip      (Primary osteoarthritis of right hip [M16.11])      (Disorder of SI (sacroiliac) joint [M53.3])      (Chronic pain of right hip [M25.551, G89.29])    Surgeon:  Shady Canales MD Provider:  Delmer Connor MD    Anesthesia Type:  spinal ASA Status:  3          Anesthesia Type: spinal  Last vitals  BP   106/52 (05/29/19 1303)   Temp   97.6 °F (36.4 °C) (05/29/19 1303)   Pulse   79 (05/29/19 1303)   Resp   18 (05/29/19 1303)     SpO2   100 % (05/29/19 1303)     Post Anesthesia Care and Evaluation    Patient location during evaluation: bedside  Patient participation: complete - patient cannot participate  Level of consciousness: awake  Pain score: 0  Pain management: adequate  Airway patency: patent  Anesthetic complications: No anesthetic complications  PONV Status: none  Cardiovascular status: acceptable  Respiratory status: acceptable  Hydration status: acceptable

## 2019-05-29 NOTE — ANESTHESIA PREPROCEDURE EVALUATION
Anesthesia Evaluation     Patient summary reviewed   NPO Solid Status: > 8 hours  NPO Liquid Status: > 8 hours           Airway   Mallampati: II  TM distance: >3 FB  Neck ROM: full  No difficulty expected  Dental    (+) poor dentition    Pulmonary - normal exam   Cardiovascular - normal exam  Exercise tolerance: good (4-7 METS)    NYHA Classification: II  ECG reviewed    (+) hypertension, hyperlipidemia,       Neuro/Psych  (+) psychiatric history Anxiety,     GI/Hepatic/Renal/Endo      Musculoskeletal     Abdominal    Substance History      OB/GYN          Other   (+) arthritis                     Anesthesia Plan    ASA 3     spinal   (Patient is indeed interested in the spinal anesthetic technique today with the caveat that a GA may be needed if the spinal fails.)  intravenous induction   Anesthetic plan, all risks, benefits, and alternatives have been provided, discussed and informed consent has been obtained with: patient.

## 2019-05-29 NOTE — ANESTHESIA PROCEDURE NOTES
Spinal Block      Patient location during procedure: OR  Start Time: 5/29/2019 10:50 AM  Stop Time: 5/29/2019 10:55 AM  Indication:at surgeon's request, post-op pain management and procedure for pain  Performed By  CRNA: Blas Jefferson CRNA  Preanesthetic Checklist  Completed: patient identified, site marked, surgical consent, pre-op evaluation, timeout performed, IV checked, risks and benefits discussed and monitors and equipment checked  Spinal Block Prep:  Patient Position:sitting  Sterile Tech:cap, gloves, sterile barriers and mask  Prep:Betadine  Patient Monitoring:EKG, continuous pulse oximetry and blood pressure monitoring  Spinal Block Procedure  Approach:midline  Guidance:landmark technique and palpation technique  Location:L3-L4  Needle Type:Pencan  Needle Gauge:22 G  Placement of Spinal needle event:cerebrospinal fluid aspirated  Paresthesia: no  Fluid Appearance:clear  Medications: bupivacaine PF (MARCAINE) 0.75 % injection, 1.8 mL  Med Administered at 5/29/2019 10:55 AM   Post Assessment  Patient Tolerance:patient tolerated the procedure well with no apparent complications  Complications no

## 2019-05-30 ENCOUNTER — ANTICOAGULATION VISIT (OUTPATIENT)
Dept: PHARMACY | Facility: HOSPITAL | Age: 74
End: 2019-05-30

## 2019-05-30 VITALS
WEIGHT: 139.77 LBS | DIASTOLIC BLOOD PRESSURE: 65 MMHG | RESPIRATION RATE: 16 BRPM | SYSTOLIC BLOOD PRESSURE: 151 MMHG | HEART RATE: 75 BPM | BODY MASS INDEX: 22.46 KG/M2 | TEMPERATURE: 97.1 F | HEIGHT: 66 IN | OXYGEN SATURATION: 98 %

## 2019-05-30 LAB
INR PPP: 1.4 (ref 0.8–1.2)
PROTHROMBIN TIME: 16.7 SECONDS (ref 11.1–15.3)

## 2019-05-30 PROCEDURE — 97116 GAIT TRAINING THERAPY: CPT

## 2019-05-30 PROCEDURE — 85610 PROTHROMBIN TIME: CPT | Performed by: ORTHOPAEDIC SURGERY

## 2019-05-30 PROCEDURE — 25010000002 CEFAZOLIN PER 500 MG: Performed by: ORTHOPAEDIC SURGERY

## 2019-05-30 PROCEDURE — 97530 THERAPEUTIC ACTIVITIES: CPT

## 2019-05-30 PROCEDURE — 97535 SELF CARE MNGMENT TRAINING: CPT

## 2019-05-30 PROCEDURE — 97110 THERAPEUTIC EXERCISES: CPT

## 2019-05-30 PROCEDURE — 99024 POSTOP FOLLOW-UP VISIT: CPT | Performed by: ORTHOPAEDIC SURGERY

## 2019-05-30 RX ORDER — WARFARIN SODIUM 2.5 MG/1
2.5 TABLET ORAL NIGHTLY
Qty: 41 TABLET | Refills: 0 | Status: SHIPPED | OUTPATIENT
Start: 2019-05-30 | End: 2019-07-10

## 2019-05-30 RX ORDER — OXYCODONE AND ACETAMINOPHEN 7.5; 325 MG/1; MG/1
1 TABLET ORAL EVERY 4 HOURS PRN
Qty: 40 TABLET | Refills: 0 | Status: SHIPPED | OUTPATIENT
Start: 2019-05-30 | End: 2019-06-05 | Stop reason: SDUPTHER

## 2019-05-30 RX ADMIN — FERROUS SULFATE TAB EC 324 MG (65 MG FE EQUIVALENT) 325 MG: 324 (65 FE) TABLET DELAYED RESPONSE at 09:09

## 2019-05-30 RX ADMIN — ACETAMINOPHEN 1000 MG: 500 TABLET ORAL at 09:11

## 2019-05-30 RX ADMIN — OXYCODONE HYDROCHLORIDE 10 MG: 10 TABLET, FILM COATED, EXTENDED RELEASE ORAL at 09:11

## 2019-05-30 RX ADMIN — BUPROPION HYDROCHLORIDE 300 MG: 150 TABLET, FILM COATED, EXTENDED RELEASE ORAL at 09:10

## 2019-05-30 RX ADMIN — SODIUM CHLORIDE 2 G: 9 INJECTION, SOLUTION INTRAVENOUS at 02:04

## 2019-05-30 RX ADMIN — FAMOTIDINE 40 MG: 40 TABLET ORAL at 09:10

## 2019-05-30 RX ADMIN — ESCITALOPRAM OXALATE 10 MG: 10 TABLET ORAL at 09:12

## 2019-05-30 RX ADMIN — SENNOSIDES AND DOCUSATE SODIUM 2 TABLET: 8.6; 5 TABLET ORAL at 09:10

## 2019-05-30 RX ADMIN — LISINOPRIL 10 MG: 10 TABLET ORAL at 09:11

## 2019-05-30 RX ADMIN — OXYCODONE HYDROCHLORIDE 5 MG: 5 TABLET ORAL at 02:04

## 2019-05-30 RX ADMIN — OXYCODONE HYDROCHLORIDE 5 MG: 5 TABLET ORAL at 06:35

## 2019-05-30 NOTE — PROGRESS NOTES
Anticoagulation- Warfarin     Completed Discharge Warfarin Counseling    Discussed the followin. Reason for Medication and Length of therapy  2. Drug-Drug Interactions  3. Drug-Diet Interactions  4. Drug- Alcohol Interactions  5. Signs and Symptoms of Bleeding  6. Fall risk- go to the Emergency Room  7. Home procedures:  N/A  8. Sent the Rx to: [facility] for warfarin 2.5, Number: 41  Si tablet every night or As Directed + 0 refills  9. Gave Warfarin booklet  10. Answered all Questions  11. E-Rxed in lab orders to CRUZITO Razo RPH  19  12:04 PM

## 2019-05-31 ENCOUNTER — TELEPHONE (OUTPATIENT)
Dept: ORTHOPEDIC SURGERY | Facility: CLINIC | Age: 74
End: 2019-05-31

## 2019-05-31 ENCOUNTER — APPOINTMENT (OUTPATIENT)
Dept: PHYSICAL THERAPY | Facility: HOSPITAL | Age: 74
End: 2019-05-31
Attending: ORTHOPAEDIC SURGERY

## 2019-05-31 LAB
LAB AP CASE REPORT: NORMAL
PATH REPORT.FINAL DX SPEC: NORMAL
PATH REPORT.GROSS SPEC: NORMAL

## 2019-05-31 NOTE — TELEPHONE ENCOUNTER
MELANIE      THERAPY CALLED JUST TO LET YOU KNOW THAT THE Pt HAD AN APPT TODAY BUT CANCELED BECAUSE SHE SAID SHE WAS TO WORE OUT AND THEY RESCHEDULED HER FOR Tuesday 6/4/19

## 2019-06-03 ENCOUNTER — APPOINTMENT (OUTPATIENT)
Dept: LAB | Facility: HOSPITAL | Age: 74
End: 2019-06-03

## 2019-06-03 LAB
ALBUMIN SERPL-MCNC: 3.7 G/DL (ref 3.5–5.2)
ALBUMIN/GLOB SERPL: 1 G/DL
ALP SERPL-CCNC: 173 U/L (ref 39–117)
ALT SERPL W P-5'-P-CCNC: 17 U/L (ref 1–33)
ANION GAP SERPL CALCULATED.3IONS-SCNC: 17.8 MMOL/L
AST SERPL-CCNC: 30 U/L (ref 1–32)
BILIRUB SERPL-MCNC: 0.4 MG/DL (ref 0.2–1.2)
BUN BLD-MCNC: 9 MG/DL (ref 8–23)
BUN/CREAT SERPL: 11.1 (ref 7–25)
CALCIUM SPEC-SCNC: 10 MG/DL (ref 8.6–10.5)
CHLORIDE SERPL-SCNC: 98 MMOL/L (ref 98–107)
CHOLEST SERPL-MCNC: 157 MG/DL (ref 0–200)
CO2 SERPL-SCNC: 25.2 MMOL/L (ref 22–29)
CREAT BLD-MCNC: 0.81 MG/DL (ref 0.57–1)
GFR SERPL CREATININE-BSD FRML MDRD: 69 ML/MIN/1.73
GLOBULIN UR ELPH-MCNC: 3.6 GM/DL
GLUCOSE BLD-MCNC: 84 MG/DL (ref 65–99)
HDLC SERPL-MCNC: 58 MG/DL (ref 40–60)
LDLC SERPL CALC-MCNC: 76 MG/DL (ref 0–100)
LDLC/HDLC SERPL: 1.3 {RATIO}
MAGNESIUM SERPL-MCNC: 1.9 MG/DL (ref 1.6–2.4)
POTASSIUM BLD-SCNC: 4.8 MMOL/L (ref 3.5–5.2)
PROT SERPL-MCNC: 7.3 G/DL (ref 6–8.5)
SODIUM BLD-SCNC: 141 MMOL/L (ref 136–145)
TRIGL SERPL-MCNC: 117 MG/DL (ref 0–150)
VLDLC SERPL-MCNC: 23.4 MG/DL (ref 5–40)

## 2019-06-03 PROCEDURE — 80061 LIPID PANEL: CPT | Performed by: FAMILY MEDICINE

## 2019-06-03 PROCEDURE — 80053 COMPREHEN METABOLIC PANEL: CPT | Performed by: FAMILY MEDICINE

## 2019-06-03 PROCEDURE — 36415 COLL VENOUS BLD VENIPUNCTURE: CPT | Performed by: FAMILY MEDICINE

## 2019-06-03 PROCEDURE — 83735 ASSAY OF MAGNESIUM: CPT | Performed by: FAMILY MEDICINE

## 2019-06-04 ENCOUNTER — ANTICOAGULATION VISIT (OUTPATIENT)
Dept: PHARMACY | Facility: HOSPITAL | Age: 74
End: 2019-06-04

## 2019-06-04 ENCOUNTER — LAB (OUTPATIENT)
Dept: LAB | Facility: HOSPITAL | Age: 74
End: 2019-06-04

## 2019-06-04 ENCOUNTER — HOSPITAL ENCOUNTER (OUTPATIENT)
Dept: PHYSICAL THERAPY | Facility: HOSPITAL | Age: 74
Setting detail: THERAPIES SERIES
Discharge: HOME | End: 2019-06-04
Attending: ORTHOPAEDIC SURGERY

## 2019-06-04 DIAGNOSIS — M16.11 PRIMARY OSTEOARTHRITIS OF RIGHT HIP: Primary | ICD-10-CM

## 2019-06-04 DIAGNOSIS — Z79.01 LONG TERM (CURRENT) USE OF ANTICOAGULANTS: Primary | ICD-10-CM

## 2019-06-04 DIAGNOSIS — Z96.641 STATUS POST TOTAL REPLACEMENT OF RIGHT HIP: ICD-10-CM

## 2019-06-04 DIAGNOSIS — Z78.9 IMPAIRED MOBILITY AND ADLS: ICD-10-CM

## 2019-06-04 DIAGNOSIS — M25.551 CHRONIC PAIN OF RIGHT HIP: Chronic | ICD-10-CM

## 2019-06-04 DIAGNOSIS — Z74.09 IMPAIRED FUNCTIONAL MOBILITY, BALANCE, GAIT, AND ENDURANCE: ICD-10-CM

## 2019-06-04 DIAGNOSIS — G89.29 CHRONIC PAIN OF RIGHT HIP: Chronic | ICD-10-CM

## 2019-06-04 DIAGNOSIS — Z74.09 IMPAIRED MOBILITY AND ADLS: ICD-10-CM

## 2019-06-04 LAB
INR PPP: 3.38 (ref 0.8–1.2)
PROTHROMBIN TIME: 33.9 SECONDS (ref 11.1–15.3)

## 2019-06-04 PROCEDURE — 97110 THERAPEUTIC EXERCISES: CPT | Performed by: PHYSICAL THERAPIST

## 2019-06-04 PROCEDURE — 36415 COLL VENOUS BLD VENIPUNCTURE: CPT

## 2019-06-04 PROCEDURE — 85610 PROTHROMBIN TIME: CPT

## 2019-06-04 PROCEDURE — 97162 PT EVAL MOD COMPLEX 30 MIN: CPT | Performed by: PHYSICAL THERAPIST

## 2019-06-04 NOTE — PROGRESS NOTES
Spoke with Brunilda. Reviewed current lab.  No s/s of bleeding. No new meds. No missed doses. Reviewed schedule for following week. Pt read back regimen and verbalized understanding. All questions answered. Next INR on 6/11 provided by [facility].    Italo Razo RP  06/04/19  6:39 PM

## 2019-06-04 NOTE — THERAPY EVALUATION
Outpatient Physical Therapy Ortho Initial Evaluation  [facility]     Patient Name: Brunilda Horner  : 1945  MRN: [id number]  Today's Date: 2019      Visit Date: 2019  Attendance:  (Medicare)  Subjective Improvement: n/a  Next MD Appt: 19  Recert Date: 19    Therapy Diagnosis: R total hip arthroplasty 19         Past Medical History:   Diagnosis Date   • Anxiety     Generalized anxiety disorder    2015    • Arthritis    • Blepharitis 2014   • Cellulitis of foot 2016   • Depression    • History of Papanicolaou smear of cervix 2009    negative   • Hypertension    • Osteopenia    • Pure hypercholesterolemia 2015        Past Surgical History:   Procedure Laterality Date   • BREAST BIOPSY     • BREAST SURGERY  1994    Excision of mass, left breast. Mass, left breast   • COLONOSCOPY  2010    no polyps   • FOOT SURGERY  2001    Removal of tumor, radical resection of soft tissue, right foot. Recurrent plantar fibroma, plantar, right foot   • MAMMO STEREOTACTIC BREAST BIOPSY 1ST W WO DEVICE  1988    Needle localization and excision of micro-calcifications. Micro-calcifications, left breast   • SKIN BIOPSY  1991    Tangential excision keratoses left cheek. Exicison 25 papilloma, neck.   • TOTAL HIP ARTHROPLASTY Right 2019    Procedure: TOTAL HIP ARTHROPLASTY ANTERIOR - right;  Surgeon: Shady Canales MD;  Location: [facility];  Service: Orthopedics       Current Outpatient Medications:   •  atorvastatin (LIPITOR) 10 MG tablet, Take 10 mg by mouth Every Night., Disp: , Rfl:   •  buPROPion XL (WELLBUTRIN XL) 300 MG 24 hr tablet, Take 1 tablet by mouth Daily., Disp: 90 tablet, Rfl: 1  •  Calcium Carbonate-Vitamin D (CALTRATE 600+D PO), Take  by mouth 2 (Two) Times a Day., Disp: , Rfl:   •  cholecalciferol (VITAMIN D3) 1000 UNITS tablet, Take 1,000 Units by mouth daily., Disp: , Rfl:   •  escitalopram (LEXAPRO) 10  MG tablet, Take 1 tablet by mouth Daily., Disp: 90 tablet, Rfl: 1  •  lisinopril (PRINIVIL,ZESTRIL) 10 MG tablet, Take 1 tablet by mouth Daily., Disp: 90 tablet, Rfl: 1  •  Multiple Vitamins-Minerals (ICAPS AREDS 2 PO), Take  by mouth 2 (Two) Times a Day., Disp: , Rfl:   •  Multiple Vitamins-Minerals (MULTIVITAMIN ADULT) tablet, Take 1 tablet by mouth Daily., Disp: , Rfl:   •  Omega-3 Fatty Acids (OMEGA-3 PO), Take  by mouth Daily., Disp: , Rfl:   •  oxyCODONE-acetaminophen (PERCOCET) 7.5-325 MG per tablet, Take 1 tablet by mouth Every 4 (Four) Hours As Needed for Moderate Pain ., Disp: 40 tablet, Rfl: 0  •  temazepam (RESTORIL) 30 MG capsule, Take 1 capsule by mouth At Night As Needed for Sleep., Disp: 30 capsule, Rfl: 0  •  tiZANidine (ZANAFLEX) 4 MG tablet, Take 1 tablet by mouth At Night As Needed for Muscle Spasms., Disp: 30 tablet, Rfl: 5  •  vitamin E 400 UNIT capsule, Take 400 Units by mouth daily., Disp: , Rfl:   •  warfarin (COUMADIN) 2.5 MG tablet, Take 1 tablet by mouth Every Night for 41 doses. Or as directed by [facility] Pharmacists, Disp: 41 tablet, Rfl: 0    No Known Allergies    Visit Dx:     ICD-10-CM ICD-9-CM   1. Primary osteoarthritis of right hip M16.11 715.15   2. Impaired mobility and ADLs Z74.09 799.89   3. Impaired functional mobility, balance, gait, and endurance Z74.09 V49.89   4. Status post total replacement of right hip Z96.641 V43.64   5. Chronic pain of right hip M25.551 719.45    G89.29 338.29         Patient History     Row Name 06/04/19 1400             History    Chief Complaint  Difficulty Walking;Difficulty with daily activities;Pain  -SS      Type of Pain  Lower Extremity / Leg;Hip pain right  -SS      Date Current Problem(s) Began  05/29/19  -      Brief Description of Current Complaint  Patient underwent right total hip arthroplasty on 5/29/19 due to chronic hip pain. She was discharged home on 5/30/19. She reports that she was up on her feet more after returning home and  "overdid activity. Her leg was swollen on 5/31/19 and she missed her scheduled evaluation. She notes burning pain and tightness in the distal quad and lateral hip. \"It feels pretty good\" today. Hot poker sensation when gets up after prolonged sitting and laying. Doing some activity around the house.  female with children. Lives 2 story house with 1 step to enter house from the garage, 2 steps down into living room, and 12 stairs down into basement. Not accessing basement at this time.   -SS      Patient/Caregiver Goals  Improve mobility;Improve strength \"To get to where I can use it.\"  -SS      Current Tobacco Use  no  -SS      Smoking Status  quit > 40 years ago  -SS      Patient's Rating of General Health  Very good  -SS      Occupation/sports/leisure activities  Retired. Hobbies: door to door work with [employer], read, computer work, shopping  -SS      Surgery/Hospitalization  5/29/19 total hip; D/C 5/30/19  -SS         Pain     Pain Location  Hip;Leg right  -SS      Pain at Present  2  -SS      Pain at Best  0 since surgery  -SS      Pain at Worst  10 since surgery  -SS      Pain Frequency  Intermittent  -SS      Pain Description  Burning aching anterior hip/proximal thigh  -SS      What Performance Factors Make the Current Problem(s) WORSE?  getting up after prolonged sitting/laying, walking very far  -      What Performance Factors Make the Current Problem(s) BETTER?  rest, ice  -SS      Is your sleep disturbed?  No  -SS      Is medication used to assist with sleep?  Yes  -SS      Difficulties at work?  n/a  -SS      Difficulties with ADL's?  decreased activity  -SS      Difficulties with recreational activities?  decreased activity  -SS         Fall Risk Assessment    Any falls in the past year:  Yes  -SS      Number of falls reported in the last 12 months  1  -SS      Factors that contributed to the fall:  Tripped  -SS      Does patient have a fear of falling  No  -SS         Daily " Activities    Primary Language  English  -SS         Safety    Are you being hurt, hit, or frightened by anyone at home or in your life?  No  -SS      Are you being neglected by a caregiver  No  -SS        User Key  (r) = Recorded By, (t) = Taken By, (c) = Cosigned By    Initials Name Provider Type    Ezio Jara, PT DPT Physical Therapist          PT Ortho     Row Name 06/04/19 1400       Subjective Comments    Subjective Comments  see Therapy Patient History  -       Precautions and Contraindications    Precautions  R anterior approach total hip arthroplasty 5/29/19  -       Subjective Pain    Able to rate subjective pain?  yes  -SS    Pre-Treatment Pain Level  2  -SS    Post-Treatment Pain Level  4  -    Subjective Pain Comment  states will ice at home  -SS       Posture/Observations    Posture/Observations Comments  Gait with Rollator. Antalgia noted.   -SS       Right Lower Ext    Rt Hip ABduction AROM  20  -SS    Rt Hip ADduction AROM  20  -SS    Rt Hip Flexion AROM  90; pain  -SS    Rt Knee Extension/Flexion AROM  0-; burning in distal thigh with extension  -SS       Sensation    Additional Comments  Intact distally. Numb proximal and mid-thigh anteriorly and entire lateral thigh.  -      User Key  (r) = Recorded By, (t) = Taken By, (c) = Cosigned By    Initials Name Provider Type    Ezio Jara, PT DPT Physical Therapist            Therapy Education  Education Details: therapy plan  How Provided: Verbal  Provided to: Patient  Level of Understanding: Verbalized     PT OP Goals     Row Name 06/04/19 1400          PT Short Term Goals    STG Date to Achieve  06/25/19  -     STG 1  Note a >/= 50% subjective improvement.  -     STG 2  Demonstrate ability to ambulate household distances with cane.  -     STG 3  R knee active extension to 0 degrees.   -     STG 4  R hip active flexion to be >/= 100 degrees.  -     STG 5  LEFS score to be >/= 25/80.  -        Long  Term Goals    LTG Date to Achieve  07/16/19  -     LTG 1  Independent with HEP/self-management.  -SS     LTG 2  Demonstrate ability to ambulate community distances without assistive device.  -SS     LTG 3  LEFS score to be >/= 40/80.  -SS     LTG 4  Demonstrate ability to ascend 5 stairs reciprocally with 1 handrail.  -        Time Calculation    PT Goal Re-Cert Due Date  06/25/19  -       User Key  (r) = Recorded By, (t) = Taken By, (c) = Cosigned By    Initials Name Provider Type     Ezio Crockett, PT DPT Physical Therapist          PT Assessment/Plan     Row Name 06/04/19 1400          PT Assessment    Functional Limitations  Impaired gait;Limitation in home management;Limitations in community activities;Limitations in functional capacity and performance;Performance in leisure activities;Performance in self-care ADL  -     Impairments  Gait;Joint mobility;Integumentary integrity;Range of motion;Pain;Balance;Endurance  -     Assessment Comments  Patient is 6 days s/p R total hip arthroplasty. Burning in anterior thigh with anterior hip capsule stretch. Some hip pain with Pro2.   -     Rehab Potential  Good  -     Patient/caregiver participated in establishment of treatment plan and goals  Yes  -     Patient would benefit from skilled therapy intervention  Yes  -SS        PT Plan    PT Frequency  2x/week;3x/week  -     Predicted Duration of Therapy Intervention (Therapy Eval)  6 weeks  -     PT Plan Comments  ROM, gentle stretching, gentle strengthening, gait training, stair training, ice with or without IFC estim as needed for pain  -       User Key  (r) = Recorded By, (t) = Taken By, (c) = Cosigned By    Initials Name Provider Type     Ezio Crockett, PT DPT Physical Therapist            Exercises     Row Name 06/04/19 1400             Precautions    Existing Precautions/Restrictions  -- R total hip 5/29/19  -         Subjective Comments    Subjective Comments  see  Therapy Patient History  -SS         Subjective Pain    Able to rate subjective pain?  yes  -SS      Pre-Treatment Pain Level  2  -SS      Post-Treatment Pain Level  4  -SS      Subjective Pain Comment  states will ice at home  -SS         Exercise 1    Exercise Name 1  Pro2, Seat 11, LE, ROM  -SS      Cueing 1  Verbal  -SS      Time 1  8 mins  -SS      Additional Comments  Level 1  -SS         Exercise 2    Exercise Name 2  Anterior hip capsule stretch  -SS      Cueing 2  Verbal;Demo  -SS      Sets 2  3  -SS      Time 2  30 sec hold  -SS         Exercise 3    Exercise Name 3  Standing hip flexion  -SS      Cueing 3  Verbal;Demo  -SS      Sets 3  1  -SS      Reps 3  10  -SS         Exercise 4    Exercise Name 4  Standing knee flexion  -SS      Cueing 4  Verbal;Demo  -SS      Sets 4  1  -SS      Reps 4  10  -SS        User Key  (r) = Recorded By, (t) = Taken By, (c) = Cosigned By    Initials Name Provider Type    Ezio Jara, PT DPT Physical Therapist           Outcome Measure Options: Lower Extremity Functional Scale (LEFS)  Lower Extremity Functional Index  Any of your usual work, housework or school activities: Quite a bit of difficulty  Your usual hobbies, recreational or sporting activities: No difficulty  Getting into or out of the bath: Moderate difficulty  Walking between rooms: Moderate difficulty  Putting on your shoes or socks: Moderate difficulty  Squatting: Extreme difficulty or unable to perform activity  Lifting an object, like a bag of groceries from the floor: Extreme difficulty or unable to perform activity  Performing light activities around your home: Moderate difficulty  Performing heavy activities around your home: Extreme difficulty or unable to perform activity  Getting into or out of a car: Moderate difficulty  Walking 2 blocks: Extreme difficulty or unable to perform activity  Walking a mile: Extreme difficulty or unable to perform activity  Going up or down 10 stairs (about 1  flight of stairs): Extreme difficulty or unable to perform activity  Standing for 1 hour: Extreme difficulty or unable to perform activity  Sitting for 1 hour: Quite a bit of difficulty  Running on even ground: Extreme difficulty or unable to perform activity  Running on uneven ground: Extreme difficulty or unable to perform activity  Making sharp turns while running fast: Extreme difficulty or unable to perform activity  Hopping: Extreme difficulty or unable to perform activity  Rolling over in bed: Extreme difficulty or unable to perform activity  Total: 16      Time Calculation:     Start Time: 1433  Stop Time: 1514  Time Calculation (min): 41 min  Total Timed Code Minutes- PT: 12 minute(s)     Therapy Charges for Today     Code Description Service Date Service Provider Modifiers Qty    35624973261 HC PT THER PROC EA 15 MIN 6/4/2019 Ezio Crockett, PT DPT GP 1    62953148238 HC PT EVAL MOD COMPLEXITY 2 6/4/2019 Ezio Crockett, PT DPT GP 1                   Ezio Crockett, PT, DPT, CHT  6/4/2019

## 2019-06-05 NOTE — DISCHARGE SUMMARY
Patient Name: Brunilda Horner  Patient YOB: 1945    Date of Admission:  5/29/2019  Date of Discharge:  5/30/2019  Discharge Diagnosis:   Primary osteoarthritis of right hip    Disorder of SI (sacroiliac) joint    Chronic pain of right hip    Impaired mobility and ADLs    Impaired functional mobility, balance, gait, and endurance    Generalized anxiety disorder    Essential hypertension    Presenting Problem/History of Present Illness: Primary osteoarthritis of right hip [M16.11]  Disorder of SI (sacroiliac) joint [M53.3]  Chronic pain of right hip [M25.551, G89.29]  Primary osteoarthritis of right hip [M16.11]    Procedure:  Procedure(s) (LRB):  TOTAL HIP ARTHROPLASTY ANTERIOR - right (Right)    Admitting Physician:  Shady Canales MD    Consults:   Consults     No orders found from 4/30/2019 to 5/30/2019.          DETAILS OF HOSPITAL STAY:  Patient is a 74 y.o. female was admitted to the floor following the above procedure and underwent an uncomplicated hospital stay.  Patient did well with physical therapy and was ambulating well without problems.  On the day of discharge the wound was clean, dry and intact and calf was soft and non tender and Homans sign was negative.  Patient was tolerating diet without problems.  Patient was discharged home.    Condition on Discharge:  Stable      LABS:      Admission on 05/29/2019, Discharged on 05/30/2019   Component Date Value Ref Range Status   • ABO Type 05/29/2019 O   Final   • RH type 05/29/2019 Positive   Final   • Antibody Screen 05/29/2019 Negative   Final   • T&S Expiration Date 05/29/2019 6/1/2019 11:59:59 PM   Final   • Previous History 05/29/2019 Previous Record on File   Final   • Case Report 05/29/2019    Final                    Value:Surgical Pathology Report                         Case: [id number]                                  Authorizing Provider:  Shady Canales MD Collected:           05/29/2019 12:23 PM          Ordering  Location:     [facility]             Received:            05/30/2019 06:14 AM                                 [place] [facility]                                                              Pathologist:           Hamilton Vazquez MD                                                         Specimen:    Hip, Right, femoral head from right hip                                                   • Final Diagnosis 05/29/2019    Final                    Value:This result contains rich text formatting which cannot be displayed here.   • Gross Description 05/29/2019    Final                    Value:This result contains rich text formatting which cannot be displayed here.   • Hemoglobin 05/29/2019 11.4* 12.0 - 15.9 g/dL Final   • Hematocrit 05/29/2019 34.2  34.0 - 46.6 % Final   • Protime 05/30/2019 16.7* 11.1 - 15.3 Seconds Final   • INR 05/30/2019 1.40* 0.80 - 1.20 Final       No results found.    Discharge Medications     Discharge Medications      New Medications      Instructions Start Date   oxyCODONE-acetaminophen 7.5-325 MG per tablet  Commonly known as:  PERCOCET   1 tablet, Oral, Every 4 Hours PRN      warfarin 2.5 MG tablet  Commonly known as:  COUMADIN   2.5 mg, Oral, Nightly, Or as directed by [facility] Pharmacists         Continue These Medications      Instructions Start Date   atorvastatin 10 MG tablet  Commonly known as:  LIPITOR   10 mg, Oral, Nightly      buPROPion  MG 24 hr tablet  Commonly known as:  WELLBUTRIN XL   300 mg, Oral, Daily      CALTRATE 600+D PO   Oral, 2 Times Daily      cholecalciferol 1000 units tablet  Commonly known as:  VITAMIN D3   1,000 Units, Oral, Daily      escitalopram 10 MG tablet  Commonly known as:  LEXAPRO   10 mg, Oral, Daily      lisinopril 10 MG tablet  Commonly known as:  PRINIVIL,ZESTRIL   10 mg, Oral, Daily      ICAPS AREDS 2 PO   Oral, 2 Times Daily      MULTIVITAMIN ADULT tablet   1 tablet, Oral, Daily      OMEGA-3 PO   Oral, Daily      temazepam 30 MG  capsule  Commonly known as:  RESTORIL   30 mg, Oral, Nightly PRN      tiZANidine 4 MG tablet  Commonly known as:  ZANAFLEX   4 mg, Oral, Nightly PRN      vitamin E 400 UNIT capsule   400 Units, Oral, Daily         Stop These Medications    HYDROcodone-acetaminophen 7.5-325 MG per tablet  Commonly known as:  NORCO            Discharge Diet:   Diet Instructions     Diet: Regular      Discharge Diet:  Regular          Activity at Discharge:   Activity Instructions     Activity as Tolerated            Discharge Instructions: Patient is to continue with physical therapy exercises daily and continue working with the physical therapist as ordered. Patient may weight bear as tolerated. Continue ice regularly and use ace bandages from the foot toward the knee as needed for swelling. Patient instructed on frequent calf pumping exercises.  Patient also instructed on incentive spirometer during hospitalization and encouraged to continue to use at home regularly. Patient may shower on POD#2. The wound should be gently cleaned with antibacterial soap then allowed to dry.  If there is any drainage then it can be covered with a dry sterile dressing.  If there is drainage, redness or swelling, then the physician should be notified. Follow up appointment in 2 weeks - patient to call the office at [phone] to schedule.  Coumadin management per hospital pharmacy.  All other meds per the discharge med/rec    Discharge Diagnosis:    Patient Active Problem List   Diagnosis   • Generalized anxiety disorder   • Chronic depression   • Chronic pain of right hip   • Bilateral primary osteoarthritis of hip   • Disorder of SI (sacroiliac) joint   • Primary osteoarthritis of right hip   • Other hyperlipidemia   • Essential hypertension       Follow-up Appointments  Future Appointments   Date Time Provider Department Center   6/6/2019  8:45 AM Freda Romero PTA  [facility] None   6/11/2019  3:15 PM Aby Sibley PTA  [facility] None  "  6/13/2019  9:50 AM Dena Carter APRN [facility] None   6/13/2019  1:45 PM Freda Romero, PTA  [facility] None   11/13/2019  2:30 PM Jesus Krause MD [facility] None     Additional Instructions for the Follow-ups that You Need to Schedule     Ambulatory Referral to Physical Therapy Evaluate and treat   As directed      S/p KRISTIN  No restrictions  Swelling control  Slowly advance as pain allows    Order Comments:  S/p KRISTIN No restrictions Swelling control Slowly advance as pain allows     Specialty needed:  Evaluate and treat         Call MD With Problems / Concerns   As directed      Instructions:   Call the office with questions, problems, or concerns.  Remember that pain medication can not be \"called in\" but a prescription must be written and picked up from the office.   So, be sure to notify the office with a little notice prior to running out.    Order Comments:  Instructions: Call the office with questions, problems, or concerns. Remember that pain medication can not be \"called in\" but a prescription must be written and picked up from the office. So, be sure to notify the office with a little notice prior to running out.          Discharge Follow-up with Specified Provider: SARA Garcias R Peyton; 2 Weeks   As directed      To:  SARA Garcias R Peyton    Follow Up:  2 Weeks                  Shady Canales MD  06/05/19  7:25 AM  "

## 2019-06-06 ENCOUNTER — TELEPHONE (OUTPATIENT)
Dept: ORTHOPEDIC SURGERY | Facility: CLINIC | Age: 74
End: 2019-06-06

## 2019-06-06 ENCOUNTER — HOSPITAL ENCOUNTER (OUTPATIENT)
Dept: PHYSICAL THERAPY | Facility: HOSPITAL | Age: 74
Setting detail: THERAPIES SERIES
Discharge: HOME | End: 2019-06-06
Attending: ORTHOPAEDIC SURGERY

## 2019-06-06 DIAGNOSIS — M16.11 PRIMARY OSTEOARTHRITIS OF RIGHT HIP: Primary | ICD-10-CM

## 2019-06-06 DIAGNOSIS — Z96.641 STATUS POST TOTAL REPLACEMENT OF RIGHT HIP: ICD-10-CM

## 2019-06-06 PROCEDURE — 97110 THERAPEUTIC EXERCISES: CPT

## 2019-06-06 RX ORDER — OXYCODONE AND ACETAMINOPHEN 7.5; 325 MG/1; MG/1
1 TABLET ORAL EVERY 6 HOURS PRN
Qty: 40 TABLET | Refills: 0 | Status: SHIPPED | OUTPATIENT
Start: 2019-06-06 | End: 2019-06-18 | Stop reason: SDUPTHER

## 2019-06-06 NOTE — TELEPHONE ENCOUNTER
DR NAVARRO.  PATIENT CALLED REQUESTING A REFILL OF OXYCODONE 7.5 MG.  SHE HAS RECENTLY HAD RT HIP SURGERY.  SHE SAYS SHE HAS HAD TO TAKE 2 PILLS AT TIMES AND SHE IS RUNNING OUT EARLY.  SHE IS NOT DUE UNTIL Saturday.  SHE IS ASKING IF YOU CAN REWRITE THE PRESCRIPTION FOR 1 OR 2 PILLS?  HER , ARISTEO BLANDON, WILL BE PICKING UP THE PRESCRIPTION.

## 2019-06-06 NOTE — THERAPY TREATMENT NOTE
Outpatient Physical Therapy Ortho Treatment Note  [facility]     Patient Name: Brunilda Horner  : 1945  MRN: [id number]  Today's Date: 2019      Visit Date: 2019     Subjective Improvement 0  Visits 2/2  Visits approved medicare cap  RTMD 2019  Recert Date 2019    S/P R Total hip arthroplasty on 2019    Visit Dx:    ICD-10-CM ICD-9-CM   1. Primary osteoarthritis of right hip M16.11 715.15   2. Status post total replacement of right hip Z96.641 V43.64       Patient Active Problem List   Diagnosis   • Generalized anxiety disorder   • Chronic depression   • Chronic pain of right hip   • Bilateral primary osteoarthritis of hip   • Disorder of SI (sacroiliac) joint   • Primary osteoarthritis of right hip   • Other hyperlipidemia   • Essential hypertension        Past Medical History:   Diagnosis Date   • Anxiety     Generalized anxiety disorder    2015    • Arthritis    • Blepharitis 2014   • Cellulitis of foot 2016   • Depression    • History of Papanicolaou smear of cervix 2009    negative   • Hypertension    • Osteopenia    • Pure hypercholesterolemia 2015        Past Surgical History:   Procedure Laterality Date   • BREAST BIOPSY     • BREAST SURGERY  1994    Excision of mass, left breast. Mass, left breast   • COLONOSCOPY  2010    no polyps   • FOOT SURGERY  2001    Removal of tumor, radical resection of soft tissue, right foot. Recurrent plantar fibroma, plantar, right foot   • MAMMO STEREOTACTIC BREAST BIOPSY 1ST W WO DEVICE  1988    Needle localization and excision of micro-calcifications. Micro-calcifications, left breast   • SKIN BIOPSY  1991    Tangential excision keratoses left cheek. Exicison 25 papilloma, neck.   • TOTAL HIP ARTHROPLASTY Right 2019    Procedure: TOTAL HIP ARTHROPLASTY ANTERIOR - right;  Surgeon: Shady Canales MD;  Location: [facility] OR;  Service: Orthopedics       PT Ortho      Row Name 06/06/19 0900       Precautions and Contraindications    Precautions  R ant approach on 5-  -CP       Subjective Pain    Able to rate subjective pain?  yes  -CP    Pre-Treatment Pain Level  4  -CP       Posture/Observations    Posture/Observations Comments  gait with rollator  -CP    Row Name 06/04/19 1400       Subjective Comments    Subjective Comments  see Therapy Patient History  -SS       Precautions and Contraindications    Precautions  R anterior approach total hip arthroplasty 5/29/19  -SS       Subjective Pain    Able to rate subjective pain?  yes  -SS    Pre-Treatment Pain Level  2  -SS    Post-Treatment Pain Level  4  -SS    Subjective Pain Comment  states will ice at home  -SS       Posture/Observations    Posture/Observations Comments  Gait with Rollator. Antalgia noted.   -SS       Right Lower Ext    Rt Hip ABduction AROM  20  -SS    Rt Hip ADduction AROM  20  -SS    Rt Hip Flexion AROM  90; pain  -SS    Rt Knee Extension/Flexion AROM  0-; burning in distal thigh with extension  -SS       Sensation    Additional Comments  Intact distally. Numb proximal and mid-thigh anteriorly and entire lateral thigh.  -SS      User Key  (r) = Recorded By, (t) = Taken By, (c) = Cosigned By    Initials Name Provider Type    SS Ezio Crockett, PT DPT Physical Therapist    CP Freda Romero, PTA Physical Therapy Assistant                      PT Assessment/Plan     Row Name 06/06/19 0945          PT Assessment    Assessment Comments  Very good tolerance to todays ther ex.  Based on patients motiviation, she should do well in  therapy and reach all goals  -CP        PT Plan    PT Frequency  2x/week;3x/week  -CP     Predicted Duration of Therapy Intervention (Therapy Eval)  6 weeks  -CP     PT Plan Comments  Cont with POC.  side stepping at taping table.  -CP       User Key  (r) = Recorded By, (t) = Taken By, (c) = Cosigned By    Initials Name Provider Type    Freda Raines, PTA  "Physical Therapy Assistant          Modalities     Row Name 06/06/19 0900             Subjective Comments    Subjective Comments  Patient reports icnrease pain.  She is out of pain meds and has contacted her MD for a reffill  -CP         Ice    Ice Applied  Yes  -CP      Location  right ant hip  -CP      Rx Minutes  15 mins  -CP      Ice S/P Rx  Yes  -CP        User Key  (r) = Recorded By, (t) = Taken By, (c) = Cosigned By    Initials Name Provider Type    Freda Raines PTA Physical Therapy Assistant        Exercises     Row Name 06/06/19 0900             Subjective Comments    Subjective Comments  Patient reports icnrease pain.  She is out of pain meds and has contacted her MD for a reffill  -CP         Subjective Pain    Able to rate subjective pain?  yes  -CP      Pre-Treatment Pain Level  4  -CP      Post-Treatment Pain Level  5  -CP         Exercise 1    Exercise Name 1  Pro II level 1  -CP      Time 1  10  -CP         Exercise 2    Exercise Name 2  incline stretch  -CP      Cueing 2  Verbal;Demo  -CP      Sets 2  3  -CP      Time 2  30  -CP         Exercise 3    Exercise Name 3  gait with rollator  -CP      Cueing 3  Verbal  -CP      Reps 3  270 ft x 1  -CP         Exercise 4    Exercise Name 4  seated Hip AD squeezes  -CP      Cueing 4  Verbal;Demo  -CP      Sets 4  2  -CP      Reps 4  10  -CP      Time 4  5\" hold  -CP         Exercise 5    Exercise Name 5  LAQ  -CP      Cueing 5  Verbal;Demo  -CP      Sets 5  2  -CP      Reps 5  10  -CP      Time 5  5\" holds  -CP         Exercise 6    Exercise Name 6  Standing CR  -CP      Cueing 6  Verbal;Demo  -CP      Sets 6  2  -CP      Reps 6  10  -CP         Exercise 7    Exercise Name 7  ham sets  -CP      Cueing 7  Verbal;Demo  -CP      Sets 7  2  -CP      Reps 7  10  -CP      Time 7  5\" hold  -CP         Exercise 8    Exercise Name 8  SAQ  -CP      Cueing 8  Verbal;Demo  -CP      Sets 8  2  -CP      Reps 8  10  -CP      Time 8  5\" hoild  -CP         Exercise " 9    Exercise Name 9  supine hip ab/ad  -CP      Cueing 9  Verbal;Demo  -CP      Sets 9  1  -CP      Reps 9  10  -CP        User Key  (r) = Recorded By, (t) = Taken By, (c) = Cosigned By    Initials Name Provider Type    Freda Raines, ADAIR Physical Therapy Assistant                       PT OP Goals     Row Name 06/06/19 0900          PT Short Term Goals    STG Date to Achieve  06/25/19  -CP     STG 1  Note a >/= 50% subjective improvement.  -CP     STG 1 Progress  Not Met  -CP     STG 2  Demonstrate ability to ambulate household distances with cane.  -CP     STG 2 Progress  Not Met  -CP     STG 3  R knee active extension to 0 degrees.   -CP     STG 3 Progress  Progressing  -CP     STG 4  R hip active flexion to be >/= 100 degrees.  -CP     STG 4 Progress  Progressing  -CP     STG 5  LEFS score to be >/= 25/80.  -CP     STG 5 Progress  Not Met  -CP        Long Term Goals    LTG Date to Achieve  07/16/19  -CP     LTG 1  Independent with HEP/self-management.  -CP     LTG 1 Progress  Ongoing  -CP     LTG 2  Demonstrate ability to ambulate community distances without assistive device.  -CP     LTG 2 Progress  Not Met  -CP     LTG 3  LEFS score to be >/= 40/80.  -CP     LTG 3 Progress  Not Met  -CP     LTG 4  Demonstrate ability to ascend 5 stairs reciprocally with 1 handrail.  -CP     LTG 4 Progress  Not Met  -CP        Time Calculation    PT Goal Re-Cert Due Date  06/25/19  -CP       User Key  (r) = Recorded By, (t) = Taken By, (c) = Cosigned By    Initials Name Provider Type    Freda Raines PTA Physical Therapy Assistant          Therapy Education  Education Details: hip AD squeezes, LAQ, Standing CR, SAQ, Ham sets, supine hip AB/AD  Given: HEP  Program: New  How Provided: Verbal, Demonstration, Written  Provided to: Patient  Level of Understanding: Teach back education performed, Verbalized, Demonstrated              Time Calculation:   Start Time: 0845  Stop Time: 0950  Time Calculation (min): 65  min  Total Timed Code Minutes- PT: 45 minute(s)  Therapy Charges for Today     Code Description Service Date Service Provider Modifiers Qty    19742829652 HC PT THER SUPP EA 15 MIN 6/6/2019 Freda Romero PTA GP 1    81683157885 HC PT THER PROC EA 15 MIN 6/6/2019 Freda Romero PTA GP 3                    Freda Romero PTA  6/6/2019

## 2019-06-07 NOTE — TELEPHONE ENCOUNTER
MELANIE      Pt CALLED CHECKING TO SEE IF YOU HAD REVIEWED HER REQUEST FOR A REFILL SHE STATES SHE IS IN A LOT OF PAIN AND DOESN'T NO WHAT TO DO     I INFORMED THE Pt HER REQUEST WAS STILL PENDING AND AS SOON AS IT HAS BEEN REVIEWED SOMEONE WILL CALL HER

## 2019-06-11 ENCOUNTER — HOSPITAL ENCOUNTER (OUTPATIENT)
Dept: PHYSICAL THERAPY | Facility: HOSPITAL | Age: 74
Setting detail: THERAPIES SERIES
Discharge: HOME | End: 2019-06-11
Attending: ORTHOPAEDIC SURGERY

## 2019-06-11 ENCOUNTER — ANTICOAGULATION VISIT (OUTPATIENT)
Dept: PHARMACY | Facility: HOSPITAL | Age: 74
End: 2019-06-11

## 2019-06-11 ENCOUNTER — LAB (OUTPATIENT)
Dept: LAB | Facility: HOSPITAL | Age: 74
End: 2019-06-11

## 2019-06-11 DIAGNOSIS — Z79.01 LONG TERM (CURRENT) USE OF ANTICOAGULANTS: ICD-10-CM

## 2019-06-11 DIAGNOSIS — M16.11 PRIMARY OSTEOARTHRITIS OF RIGHT HIP: Primary | ICD-10-CM

## 2019-06-11 DIAGNOSIS — Z96.641 STATUS POST TOTAL REPLACEMENT OF RIGHT HIP: ICD-10-CM

## 2019-06-11 LAB
INR PPP: 1.45 (ref 0.8–1.2)
PROTHROMBIN TIME: 17.4 SECONDS (ref 11.1–15.3)

## 2019-06-11 PROCEDURE — 36415 COLL VENOUS BLD VENIPUNCTURE: CPT

## 2019-06-11 PROCEDURE — 97110 THERAPEUTIC EXERCISES: CPT

## 2019-06-11 PROCEDURE — 85610 PROTHROMBIN TIME: CPT

## 2019-06-11 NOTE — PROGRESS NOTES
Spoke with Ms. Siri. Reviewed current lab.  No s/s of bleeding. No new meds. No missed doses.    INR was 3.4 last week on warfarin 2.5 mg nightly.  1.45 this week.  Will add warfarin 2.5 mg to TuTh this week, 1.25 mg all other days.     Reviewed schedule for following week. Pt read back regimen and verbalized understanding. All questions answered. [facility]    Edwar Blanc [facility]  06/11/19  6:50 PM

## 2019-06-11 NOTE — THERAPY TREATMENT NOTE
Outpatient Physical Therapy Ortho Treatment Note  [facility]     Patient Name: Brunilda Horner  : 1945  MRN: [id number]  Today's Date: 2019      Visit Date: 2019  Subjective Improvement:  45%  Visit Number:    3/3  Recert Date:    19  MD Visit:    19 ( note will be faxed )  Total Approved Visits:   Medicare    S/P R Total hip arthroplasty on 2019      Visit Dx:    ICD-10-CM ICD-9-CM   1. Primary osteoarthritis of right hip M16.11 715.15   2. Status post total replacement of right hip Z96.641 V43.64       Patient Active Problem List   Diagnosis   • Generalized anxiety disorder   • Chronic depression   • Chronic pain of right hip   • Bilateral primary osteoarthritis of hip   • Disorder of SI (sacroiliac) joint   • Primary osteoarthritis of right hip   • Other hyperlipidemia   • Essential hypertension        Past Medical History:   Diagnosis Date   • Anxiety     Generalized anxiety disorder    2015    • Arthritis    • Blepharitis 2014   • Cellulitis of foot 2016   • Depression    • History of Papanicolaou smear of cervix 2009    negative   • Hypertension    • Osteopenia    • Pure hypercholesterolemia 2015        Past Surgical History:   Procedure Laterality Date   • BREAST BIOPSY     • BREAST SURGERY  1994    Excision of mass, left breast. Mass, left breast   • COLONOSCOPY  2010    no polyps   • FOOT SURGERY  2001    Removal of tumor, radical resection of soft tissue, right foot. Recurrent plantar fibroma, plantar, right foot   • MAMMO STEREOTACTIC BREAST BIOPSY 1ST W WO DEVICE  1988    Needle localization and excision of micro-calcifications. Micro-calcifications, left breast   • SKIN BIOPSY  1991    Tangential excision keratoses left cheek. Exicison 25 papilloma, neck.   • TOTAL HIP ARTHROPLASTY Right 2019    Procedure: TOTAL HIP ARTHROPLASTY ANTERIOR - right;  Surgeon: Shady Canales MD;  Location:   [facility] OR;  Service: Orthopedics       PT Ortho     Row Name 06/11/19 1500       Precautions and Contraindications    Precautions  R ant approach on 5-  -BB       Posture/Observations    Posture/Observations Comments  Gait independent with rollator. Able to demo safe gait with standard cane 50 ft level surface without LOB and good gait sequence.  AROM R knee extension supine 0 degrees.  Supine hip flexion with knee bent 90 degrees with discomfort R ant hip.   -BB      User Key  (r) = Recorded By, (t) = Taken By, (c) = Cosigned By    Initials Name Provider Type    Aby Alexander PTA Physical Therapy Assistant                      PT Assessment/Plan     Row Name 06/11/19 7825          PT Assessment    Assessment Comments  Continues with discomfort L ant hip and quad region.  Tightness noted R quad and pt felt decreased tightness after treatment. Pt encouraged to work on knee flexion for quad stretch in sitting.  Pt verbalizes compliance of current HEP.   -BB        PT Plan    PT Frequency  2x/week;3x/week  -BB     Predicted Duration of Therapy Intervention (Therapy Eval)  x 6 weeks  -BB     PT Plan Comments  Progress as able. MD note will be faxed.   -BB       User Key  (r) = Recorded By, (t) = Taken By, (c) = Cosigned By    Initials Name Provider Type    Aby Alexander PTA Physical Therapy Assistant          Modalities     Row Name 06/11/19 1500             Ice    Ice Applied  Yes  -BB      Location  R ant hip and thigh  -BB      Rx Minutes  10 mins Pt chose to end early.  -BB      Ice S/P Rx  Yes  -BB        User Key  (r) = Recorded By, (t) = Taken By, (c) = Cosigned By    Initials Name Provider Type    Aby Alexander PTA Physical Therapy Assistant        Exercises     Row Name 06/11/19 1500             Subjective Comments    Subjective Comments  States she still has discomfort R ant hip and thigh muscle. Reports she is making progress and walking a few steps in home without AD.    -BB          Subjective Pain    Able to rate subjective pain?  yes  -BB      Pre-Treatment Pain Level  2  -BB      Post-Treatment Pain Level  2  -BB         Exercise 1    Exercise Name 1  Pro II level 1 seat 10 ROM  -BB      Time 1  10  -BB         Exercise 2    Exercise Name 2  Incline stretch  -BB      Reps 2  3  -BB      Time 2  30  -BB         Exercise 3    Exercise Name 3  HS stretch R  -BB      Reps 3  3  -BB      Time 3  30  -BB         Exercise 4    Exercise Name 4  seated rest  -BB         Exercise 5    Exercise Name 5  standing CR/TR  -BB      Sets 5  2  -BB      Reps 5  10  -BB         Exercise 6    Exercise Name 6  standing ham curl R  -BB      Sets 6  2  -BB      Reps 6  10  -BB         Exercise 7    Exercise Name 7  LAQ with R thigh supported to reduce ant hip pain  -BB      Sets 7  2  -BB      Reps 7  10  -BB      Additional Comments  slow ecc  -BB         Exercise 8    Exercise Name 8  add sq gentle  -BB      Cueing 8  Verbal  -BB      Sets 8  2  -BB      Reps 8  10  -BB      Time 8  5 sec  -BB         Exercise 9    Exercise Name 9  side stepping at taping table  -BB      Reps 9  4 trips  -BB         Exercise 10    Exercise Name 10  Gait with standard cane 100 ft  -BB      Additional Comments  SBA but no LOB.  -BB         Exercise 11    Exercise Name 11  Ice see modalities flowhseet  -BB        User Key  (r) = Recorded By, (t) = Taken By, (c) = Cosigned By    Initials Name Provider Type    Aby Alexander, PTA Physical Therapy Assistant                       PT OP Goals     Row Name 06/11/19 1500          PT Short Term Goals    STG Date to Achieve  06/25/19  -BB     STG 1  Note a >/= 50% subjective improvement.  -BB     STG 1 Progress  Progressing  -BB     STG 2  Demonstrate ability to ambulate household distances with cane.  -BB     STG 2 Progress  Met  -BB     STG 3  R knee active extension to 0 degrees.   -BB     STG 3 Progress  Met  -BB     STG 4  R hip active flexion to be >/= 100 degrees.  -BB      STG 4 Progress  Progressing  -BB     STG 4 Progress Comments  90 degrees with ant hip discomfort  -BB     STG 5  LEFS score to be >/= 25/80.  -BB     STG 5 Progress  Not Met  -BB        Long Term Goals    LTG Date to Achieve  07/16/19  -BB     LTG 1  Independent with HEP/self-management.  -BB     LTG 1 Progress  Ongoing  -BB     LTG 2  Demonstrate ability to ambulate community distances without assistive device.  -BB     LTG 2 Progress  Not Met  -BB     LTG 3  LEFS score to be >/= 40/80.  -BB     LTG 3 Progress  Not Met  -BB     LTG 4  Demonstrate ability to ascend 5 stairs reciprocally with 1 handrail.  -BB     LTG 4 Progress  Not Met  -BB        Time Calculation    PT Goal Re-Cert Due Date  06/25/19  -BB       User Key  (r) = Recorded By, (t) = Taken By, (c) = Cosigned By    Initials Name Provider Type    Aby Alexander PTA Physical Therapy Assistant          Therapy Education  Given: HEP  Program: Reinforced              Time Calculation:   Start Time: 1520  Stop Time: 1620  Time Calculation (min): 60 min  Total Timed Code Minutes- PT: 45 minute(s)  Therapy Charges for Today     Code Description Service Date Service Provider Modifiers Qty    12178183292 HC PT THER PROC EA 15 MIN 6/11/2019 Aby Sibley PTA GP 3    83588852690 HC PT THER SUPP EA 15 MIN 6/11/2019 Aby Sibley PTA GP 1                    Aby Sibley PTA  6/11/2019

## 2019-06-12 ENCOUNTER — DOCUMENTATION (OUTPATIENT)
Dept: ORTHOPEDIC SURGERY | Facility: CLINIC | Age: 74
End: 2019-06-12

## 2019-06-12 DIAGNOSIS — M16.11 PRIMARY OSTEOARTHRITIS OF RIGHT HIP: Primary | ICD-10-CM

## 2019-06-13 ENCOUNTER — HOSPITAL ENCOUNTER (OUTPATIENT)
Dept: PHYSICAL THERAPY | Facility: HOSPITAL | Age: 74
Setting detail: THERAPIES SERIES
Discharge: HOME | End: 2019-06-13
Attending: ORTHOPAEDIC SURGERY

## 2019-06-13 ENCOUNTER — OFFICE VISIT (OUTPATIENT)
Dept: ORTHOPEDIC SURGERY | Facility: CLINIC | Age: 74
End: 2019-06-13

## 2019-06-13 VITALS — HEIGHT: 66 IN | BODY MASS INDEX: 22.66 KG/M2 | WEIGHT: 141 LBS

## 2019-06-13 DIAGNOSIS — G89.29 CHRONIC PAIN OF RIGHT HIP: Chronic | ICD-10-CM

## 2019-06-13 DIAGNOSIS — M16.11 PRIMARY OSTEOARTHRITIS OF RIGHT HIP: Primary | ICD-10-CM

## 2019-06-13 DIAGNOSIS — Z96.641 STATUS POST TOTAL REPLACEMENT OF RIGHT HIP: ICD-10-CM

## 2019-06-13 DIAGNOSIS — M25.551 CHRONIC PAIN OF RIGHT HIP: Chronic | ICD-10-CM

## 2019-06-13 PROCEDURE — 97110 THERAPEUTIC EXERCISES: CPT

## 2019-06-13 PROCEDURE — 99024 POSTOP FOLLOW-UP VISIT: CPT | Performed by: NURSE PRACTITIONER

## 2019-06-13 NOTE — THERAPY TREATMENT NOTE
Outpatient Physical Therapy Ortho Treatment Note  [facility]     Patient Name: Brunilda Horner  : 1945  MRN: [id number]  Today's Date: 2019      Visit Date: 2019     Subjective Improvement 45%  Visits 4/4  Visits approved medicare cap  RTMD not sure  Recert Date 2019    S/MN KRISTIN on   Post op 2 weeks 1 day    Visit Dx:    ICD-10-CM ICD-9-CM   1. Primary osteoarthritis of right hip M16.11 715.15   2. Status post total replacement of right hip Z96.641 V43.64       Patient Active Problem List   Diagnosis   • Generalized anxiety disorder   • Chronic depression   • Chronic pain of right hip   • Bilateral primary osteoarthritis of hip   • Disorder of SI (sacroiliac) joint   • Primary osteoarthritis of right hip   • Other hyperlipidemia   • Essential hypertension   • Status post total replacement of right hip        Past Medical History:   Diagnosis Date   • Anxiety     Generalized anxiety disorder    2015    • Arthritis    • Blepharitis 2014   • Cellulitis of foot 2016   • Depression    • History of Papanicolaou smear of cervix 2009    negative   • Hypertension    • Osteopenia    • Pure hypercholesterolemia 2015        Past Surgical History:   Procedure Laterality Date   • BREAST BIOPSY     • BREAST SURGERY  1994    Excision of mass, left breast. Mass, left breast   • COLONOSCOPY  2010    no polyps   • FOOT SURGERY  2001    Removal of tumor, radical resection of soft tissue, right foot. Recurrent plantar fibroma, plantar, right foot   • MAMMO STEREOTACTIC BREAST BIOPSY 1ST W WO DEVICE  1988    Needle localization and excision of micro-calcifications. Micro-calcifications, left breast   • SKIN BIOPSY  1991    Tangential excision keratoses left cheek. Exicison 25 papilloma, neck.   • TOTAL HIP ARTHROPLASTY Right 2019    Procedure: TOTAL HIP ARTHROPLASTY ANTERIOR - right;  Surgeon: Shady Canales MD;  Location:  " [facility] OR;  Service: Orthopedics       PT Ortho     Row Name 06/13/19 1400       Precautions and Contraindications    Precautions  R ant approach on 5-  -CP    Contraindications  2 weeks 1 days   -CP       Posture/Observations    Posture/Observations Comments  Amb into clinic with rollator in clinic ipt uses cane and independent  -CP    Row Name 06/11/19 1500       Precautions and Contraindications    Precautions  R ant approach on 5-  -BB       Posture/Observations    Posture/Observations Comments  Gait independent with rollator. Able to demo safe gait with standard cane 50 ft level surface without LOB and good gait sequence.  AROM R knee extension supine 0 degrees.  Supine hip flexion with knee bent 90 degrees with discomfort R ant hip.   -BB      User Key  (r) = Recorded By, (t) = Taken By, (c) = Cosigned By    Initials Name Provider Type    Aby Alexander, ADAIR Physical Therapy Assistant    Freda Raines, ADAIR Physical Therapy Assistant                      PT Assessment/Plan     Row Name 06/13/19 1623          PT Assessment    Assessment Comments  Reports decrease pain today.  Appears to be progressing very nicely.  Patient is safe to amb with SC however at this time prefers the rollator  -CP        PT Plan    PT Frequency  2x/week;3x/week  -CP     Predicted Duration of Therapy Intervention (Therapy Eval)  6 weeks  -CP     PT Plan Comments  Cont with POC.  Step up 4\" next  -CP       User Key  (r) = Recorded By, (t) = Taken By, (c) = Cosigned By    Initials Name Provider Type    Freda Raines, ADAIR Physical Therapy Assistant          Modalities     Row Name 06/13/19 1400             Ice    Ice Applied  Yes  -CP      Location  right hip and thing  -CP      Rx Minutes  15 mins  -CP      Ice S/P Rx  Yes  -CP        User Key  (r) = Recorded By, (t) = Taken By, (c) = Cosigned By    Initials Name Provider Type    Freda Raines PTA Physical Therapy Assistant        Exercises     " "Row Name 06/13/19 1400             Subjective Comments    Subjective Comments  Doing well. Walking with cane while in home.  Feels more comfortable using rollator in public  -CP         Subjective Pain    Able to rate subjective pain?  yes  -CP      Pre-Treatment Pain Level  -- .5/10  -CP      Post-Treatment Pain Level  0  -CP         Exercise 1    Exercise Name 1  Pro II level 2  -CP      Time 1  10  -CP         Exercise 2    Exercise Name 2  gait with cane in clinic  -CP      Cueing 2  Verbal;Demo  -CP      Time 2  270 ft  -CP         Exercise 3    Exercise Name 3  incline stretch  -CP      Cueing 3  Verbal  -CP      Sets 3  3  -CP      Time 3  30  -CP         Exercise 4    Exercise Name 4  standing HS stretch  -CP      Cueing 4  Verbal  -CP      Sets 4  3  -CP      Time 4  30  -CP         Exercise 5    Exercise Name 5  CR/TR  -CP      Cueing 5  Verbal  -CP         Exercise 6    Exercise Name 6  standing HS curl  -CP      Cueing 6  Verbal;Demo  -CP      Sets 6  2  -CP      Reps 6  10  -CP         Exercise 7    Exercise Name 7  standing marching  -CP      Cueing 7  Verbal;Demo  -CP      Sets 7  2  -CP      Reps 7  10  -CP         Exercise 8    Exercise Name 8  Standing Hip AB and EXT  -CP      Cueing 8  Verbal;Demo  -CP      Sets 8  1  -CP      Reps 8  10  -CP         Exercise 9    Exercise Name 9  heelslides  -CP      Cueing 9  Verbal  -CP      Sets 9  2  -CP      Reps 9  10  -CP         Exercise 10    Exercise Name 10  BKFO  -CP      Cueing 10  Verbal  -CP      Sets 10  2  -CP      Reps 10  10  -CP         Exercise 11    Exercise Name 11  seated ham sets on tball  -CP      Cueing 11  Verbal  -CP      Sets 11  2  -CP      Reps 11  10  -CP      Time 11  5\" hold  -CP        User Key  (r) = Recorded By, (t) = Taken By, (c) = Cosigned By    Initials Name Provider Type    CP Freda Romero, PTA Physical Therapy Assistant                       PT OP Goals     Row Name 06/13/19 1400          PT Short Term Goals    STG " Date to Achieve  06/25/19  -CP     STG 1  Note a >/= 50% subjective improvement.  -CP     STG 1 Progress  Progressing  -CP     STG 2  Demonstrate ability to ambulate household distances with cane.  -CP     STG 2 Progress  Met  -CP     STG 3  R knee active extension to 0 degrees.   -CP     STG 3 Progress  Met  -CP     STG 4  R hip active flexion to be >/= 100 degrees.  -CP     STG 4 Progress  Progressing  -CP     STG 5  LEFS score to be >/= 25/80.  -CP     STG 5 Progress  Not Met  -CP        Long Term Goals    LTG Date to Achieve  07/16/19  -CP     LTG 1  Independent with HEP/self-management.  -CP     LTG 1 Progress  Ongoing  -CP     LTG 2  Demonstrate ability to ambulate community distances without assistive device.  -CP     LTG 2 Progress  Not Met  -CP     LTG 3  LEFS score to be >/= 40/80.  -CP     LTG 3 Progress  Not Met  -CP     LTG 4  Demonstrate ability to ascend 5 stairs reciprocally with 1 handrail.  -CP     LTG 4 Progress  Not Met  -CP        Time Calculation    PT Goal Re-Cert Due Date  06/25/19  -CP       User Key  (r) = Recorded By, (t) = Taken By, (c) = Cosigned By    Initials Name Provider Type    CP Freda Romero PTA Physical Therapy Assistant          Therapy Education  Education Details: Standing hip AB and ext.  standing marching  Given: HEP  Program: New  How Provided: Verbal, Demonstration, Written  Provided to: Patient  Level of Understanding: Teach back education performed, Verbalized, Demonstrated              Time Calculation:   Start Time: 1345  Stop Time: 1445  Time Calculation (min): 60 min  Total Timed Code Minutes- PT: 45 minute(s)  Therapy Charges for Today     Code Description Service Date Service Provider Modifiers Qty    92635982372 HC PT THER SUPP EA 15 MIN 6/13/2019 Freda Romero, ADAIR GP 1    05224410430 HC PT THER PROC EA 15 MIN 6/13/2019 Freda Romero PTA GP 3                    Freda Romero PTA  6/13/2019

## 2019-06-13 NOTE — PROGRESS NOTES
"Brunilda Horner   : 1945   MRN: [id number]   DATE: 2019    Chief Complaint:    Chief Complaint   Patient presents with   • Right Hip - Post-op     Date of Surgery:  2019    SUBJECTIVE: Patient presents to office for postoperative follow-up status post right total hip arthroplasty performed per Dr. Canales on 2019.  Patient is doing well postoperatively with no unusual complaints or concerns noted.  Patient continues to progress as expected.  Patient continues with her course of physical therapy at [facility].  Patient reports gradual improvement in her right hip pain and reports her pain is much improved compared to preoperatively.  Patient continues with her Coumadin therapy as instructed for DVT prophylaxis.  Patient continues with Percocet as needed for pain.  No new complaints or concerns noted.  Patient is ambulatory in office with a steady gait and use of her walker.  X-rays are repeated today.    OBJECTIVE:   Vitals:  Vitals:    19 1006   Weight: 64 kg (141 lb)   Height: 167.6 cm (66\")     The patient is awake, alert, and oriented and in no apparent distress.     Physical Exam of the Right Hip:    Mild pain and limitations with range of motion.  Range of motion is progressing as expected.  The surgical incision is healing appropriately.  Incision is well approximated with no drainage and no erythema.  No signs of infection noted.  The calf is soft and nontender.  Homans sign is negative.    DIAGNOSTIC STUDIES  Xrays:     Xr Hip With Or Without Pelvis 2 - 3 View Right    Result Date: 2019  Narrative: AP and lateral views of the right hip with an AP view of the pelvis reveal postsurgical changes post right total hip arthroplasty.  Prostheses are well-positioned and well-aligned with no evidence of hardware failure or loosening noted.  The presence of the right hip implant is new in the interim when compared with prior images from 2019.  Joint spacing in the left hip " appears well-maintained on the pelvic view and unchanged from prior images.  No acute radiologic abnormalities are noted at this time.06/13/19 at 4:50 PM by ARYA Sagastume     PLAN: 1) Continue to monitor the surgical incision for any signs of infection including redness, swelling, tenderness, warmth and/or purulent drainage.       Patient is instructed to notify the office immediately if any such signs of infection are noted.  Wound care instructions provided, including care of      the adhesive skin closure dressing.   2) Continue with current course of physical therapy and home exercises as instructed.     3) continue with modified weightbearing with use of her walker until she is ready to transition to a cane.  PT will assist with this transition period.       Patient can progress her activity and weightbearing as tolerated.   4) Continue with ice therapy to the right hip as needed to minimize pain.   5) Continue Coumadin therapy for a total of 6 weeks postop for DVT prophylaxis, as managed per hospital pharmacy.   6) Continue Percocet as needed for pain.  Patient denies needing any refill of pain medication today.    7) Follow up in 4 weeks for a recheck.          This document has been electronically signed by ARYA Sagastume on June 17, 2019 5:35 PM

## 2019-06-17 DIAGNOSIS — F32.A CHRONIC DEPRESSION: Chronic | ICD-10-CM

## 2019-06-17 RX ORDER — TEMAZEPAM 30 MG/1
CAPSULE ORAL
Qty: 90 CAPSULE | Refills: 3 | Status: CANCELLED | OUTPATIENT
Start: 2019-06-17

## 2019-06-17 RX ORDER — TEMAZEPAM 30 MG/1
30 CAPSULE ORAL NIGHTLY PRN
Qty: 30 CAPSULE | Refills: 5 | Status: SHIPPED | OUTPATIENT
Start: 2019-06-17 | End: 2019-11-21 | Stop reason: SDUPTHER

## 2019-06-18 ENCOUNTER — ANTICOAGULATION VISIT (OUTPATIENT)
Dept: PHARMACY | Facility: HOSPITAL | Age: 74
End: 2019-06-18

## 2019-06-18 ENCOUNTER — HOSPITAL ENCOUNTER (OUTPATIENT)
Dept: PHYSICAL THERAPY | Facility: HOSPITAL | Age: 74
Setting detail: THERAPIES SERIES
Discharge: HOME | End: 2019-06-18
Attending: ORTHOPAEDIC SURGERY

## 2019-06-18 ENCOUNTER — LAB (OUTPATIENT)
Dept: LAB | Facility: HOSPITAL | Age: 74
End: 2019-06-18

## 2019-06-18 DIAGNOSIS — Z96.641 STATUS POST TOTAL REPLACEMENT OF RIGHT HIP: ICD-10-CM

## 2019-06-18 DIAGNOSIS — M16.11 PRIMARY OSTEOARTHRITIS OF RIGHT HIP: Primary | ICD-10-CM

## 2019-06-18 DIAGNOSIS — Z79.01 LONG TERM (CURRENT) USE OF ANTICOAGULANTS: ICD-10-CM

## 2019-06-18 LAB
INR PPP: 1.31 (ref 0.8–1.2)
PROTHROMBIN TIME: 16.1 SECONDS (ref 11.1–15.3)

## 2019-06-18 PROCEDURE — 36415 COLL VENOUS BLD VENIPUNCTURE: CPT

## 2019-06-18 PROCEDURE — 85610 PROTHROMBIN TIME: CPT

## 2019-06-18 PROCEDURE — 97110 THERAPEUTIC EXERCISES: CPT

## 2019-06-18 RX ORDER — OXYCODONE AND ACETAMINOPHEN 7.5; 325 MG/1; MG/1
1 TABLET ORAL EVERY 4 HOURS PRN
Qty: 18 TABLET | Refills: 0 | Status: SHIPPED | OUTPATIENT
Start: 2019-06-18 | End: 2019-06-24 | Stop reason: SDUPTHER

## 2019-06-18 NOTE — TELEPHONE ENCOUNTER
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 She will have to refill it sooner because of this (if needed) when Dr. Canales returns. Thanks.

## 2019-06-18 NOTE — THERAPY TREATMENT NOTE
Outpatient Physical Therapy Ortho Treatment Note  [facility]     Patient Name: Brunilda Horner  : 1945  MRN: [id number]  Today's Date: 2019      Visit Date: 2019    Subjective Improvement not assessed this date  Visits   Visits approved medicare cap  RTMD Not sure  Recert Date 2019    S/P Right KRISTIN on 2019    Visit Dx:    ICD-10-CM ICD-9-CM   1. Primary osteoarthritis of right hip M16.11 715.15   2. Status post total replacement of right hip Z96.641 V43.64       Patient Active Problem List   Diagnosis   • Generalized anxiety disorder   • Chronic depression   • Chronic pain of right hip   • Bilateral primary osteoarthritis of hip   • Disorder of SI (sacroiliac) joint   • Primary osteoarthritis of right hip   • Other hyperlipidemia   • Essential hypertension   • Status post total replacement of right hip        Past Medical History:   Diagnosis Date   • Anxiety     Generalized anxiety disorder    2015    • Arthritis    • Blepharitis 2014   • Cellulitis of foot 2016   • Depression    • History of Papanicolaou smear of cervix 2009    negative   • Hypertension    • Osteopenia    • Pure hypercholesterolemia 2015        Past Surgical History:   Procedure Laterality Date   • BREAST BIOPSY     • BREAST SURGERY  1994    Excision of mass, left breast. Mass, left breast   • COLONOSCOPY  2010    no polyps   • FOOT SURGERY  2001    Removal of tumor, radical resection of soft tissue, right foot. Recurrent plantar fibroma, plantar, right foot   • MAMMO STEREOTACTIC BREAST BIOPSY 1ST W WO DEVICE  1988    Needle localization and excision of micro-calcifications. Micro-calcifications, left breast   • SKIN BIOPSY  1991    Tangential excision keratoses left cheek. Exicison 25 papilloma, neck.   • TOTAL HIP ARTHROPLASTY Right 2019    Procedure: TOTAL HIP ARTHROPLASTY ANTERIOR - right;  Surgeon: Shady Canales MD;  Location:   [facility];  Service: Orthopedics       PT Ortho     Row Name 06/18/19 1500       Precautions and Contraindications    Precautions  R ant approach 5-  -CP    Contraindications  2 weeks 6 days post op  -CP       Posture/Observations    Posture/Observations Comments  amb with rollator  -CP      User Key  (r) = Recorded By, (t) = Taken By, (c) = Cosigned By    Initials Name Provider Type    Freda Raines, ADAIR Physical Therapy Assistant                      PT Assessment/Plan     Row Name 06/18/19 1522          PT Assessment    Assessment Comments  Patient ther ex decrease to reflect increase pain.  Patient is TTP to right quad.  -CP        PT Plan    PT Frequency  2x/week;3x/week  -CP     Predicted Duration of Therapy Intervention (Therapy Eval)  6 weeks  -CP     PT Plan Comments  Cont with POC.  CKC if able, Monitor response to MFR.  Recheck scheduled for next week.  -CP       User Key  (r) = Recorded By, (t) = Taken By, (c) = Cosigned By    Initials Name Provider Type    Freda Raines, ADAIR Physical Therapy Assistant            Exercises     Row Name 06/18/19 1500             Subjective Comments    Subjective Comments  For some reason patient is having a little more pain in right thigh and lat hip.  She went back to using the rollator  -CP         Subjective Pain    Able to rate subjective pain?  yes  -CP      Pre-Treatment Pain Level  2  -CP      Post-Treatment Pain Level  1  -CP         Exercise 1    Exercise Name 1  Pro II level 2  -CP      Time 1  10  -CP         Exercise 2    Exercise Name 2  incline stretch  -CP      Cueing 2  Verbal  -CP      Sets 2  3  -CP      Time 2  30  -CP         Exercise 3    Exercise Name 3  standing HS stretch  -CP      Cueing 3  Verbal  -CP      Sets 3  3  -CP      Time 3  30  -CP         Exercise 4    Exercise Name 4  side stepping // bars  -CP      Cueing 4  Verbal  -CP      Reps 4  5  -CP         Exercise 5    Exercise Name 5  high kick marching in // bar  -CP       Cueing 5  Verbal  -CP      Reps 5  5  -CP         Exercise 6    Exercise Name 6  up and down ramp in rehab gym  -CP      Cueing 6  Verbal;Demo  -CP      Reps 6  5  -CP         Exercise 7    Exercise Name 7  seated knee fl with tband  -CP      Cueing 7  Verbal;Tactile  -CP      Sets 7  2  -CP      Reps 7  10  -CP      Time 7  red   -CP         Exercise 8    Exercise Name 8  see manual  -CP        User Key  (r) = Recorded By, (t) = Taken By, (c) = Cosigned By    Initials Name Provider Type    CP Freda Romero, ADAIR Physical Therapy Assistant                      Manual Rx (last 36 hours)      Manual Treatments     Row Name 06/18/19 1500             Manual Rx 1    Manual Rx 1 Location  MFR/SRM right quad  -CP      Manual Rx 1 Duration  8  -CP        User Key  (r) = Recorded By, (t) = Taken By, (c) = Cosigned By    Initials Name Provider Type    Freda Raines, PTA Physical Therapy Assistant          PT OP Goals     Row Name 06/18/19 1500          PT Short Term Goals    STG Date to Achieve  06/25/19  -CP     STG 1  Note a >/= 50% subjective improvement.  -CP     STG 1 Progress  Progressing  -CP     STG 2  Demonstrate ability to ambulate household distances with cane.  -CP     STG 2 Progress  Met  -CP     STG 3  R knee active extension to 0 degrees.   -CP     STG 3 Progress  Met  -CP     STG 4  R hip active flexion to be >/= 100 degrees.  -CP     STG 4 Progress  Progressing  -CP     STG 5  LEFS score to be >/= 25/80.  -CP     STG 5 Progress  Not Met  -CP        Long Term Goals    LTG Date to Achieve  07/16/19  -CP     LTG 1  Independent with HEP/self-management.  -CP     LTG 1 Progress  Ongoing  -CP     LTG 2  Demonstrate ability to ambulate community distances without assistive device.  -CP     LTG 2 Progress  Not Met  -CP     LTG 3  LEFS score to be >/= 40/80.  -CP     LTG 3 Progress  Not Met  -CP     LTG 4  Demonstrate ability to ascend 5 stairs reciprocally with 1 handrail.  -CP     LTG 4 Progress  Not  Met  -CP        Time Calculation    PT Goal Re-Cert Due Date  06/25/19  -CP       User Key  (r) = Recorded By, (t) = Taken By, (c) = Cosigned By    Initials Name Provider Type    CP Freda Romero PTA Physical Therapy Assistant          Therapy Education  Education Details: Patient to use rollator to help unload right le  Given: Symptoms/condition management  Program: Reinforced  How Provided: Verbal, Demonstration  Provided to: Patient  Level of Understanding: Teach back education performed, Verbalized, Demonstrated              Time Calculation:      Therapy Charges for Today     Code Description Service Date Service Provider Modifiers Qty    55074432696 HC PT THER PROC EA 15 MIN 6/18/2019 Freda Romero PTA GP 3                    Freda Romero PTA  6/18/2019

## 2019-06-18 NOTE — PROGRESS NOTES
Spoke with patient. Reviewed current lab.  No s/s of bleeding. No new meds. No missed doses. Reviewed schedule for following week. Pt read back regimen and verbalized understanding. All questions answered. Next INR on 6/25 provided by Moe.    Mikaela William RPH  06/18/19  3:52 PM

## 2019-06-18 NOTE — TELEPHONE ENCOUNTER
Regarding: Prescription Question  Contact: [phone]  ----- Message from [employer], Generic sent at 6/18/2019  9:31 AM EDT -----    Please refill my pain medicine.  My pharmacy is [facility].    Thank you  Brunilda DE LEÓN 1945  [phone]

## 2019-06-18 NOTE — TELEPHONE ENCOUNTER
Patient was contacted and she is aware that the script will run out sooner than normal Dena filled it for what she could and sent it to [facility]

## 2019-06-20 ENCOUNTER — HOSPITAL ENCOUNTER (OUTPATIENT)
Dept: PHYSICAL THERAPY | Facility: HOSPITAL | Age: 74
Setting detail: THERAPIES SERIES
Discharge: HOME | End: 2019-06-20
Attending: ORTHOPAEDIC SURGERY

## 2019-06-20 DIAGNOSIS — Z96.641 STATUS POST TOTAL REPLACEMENT OF RIGHT HIP: ICD-10-CM

## 2019-06-20 DIAGNOSIS — M16.11 PRIMARY OSTEOARTHRITIS OF RIGHT HIP: Primary | ICD-10-CM

## 2019-06-20 PROCEDURE — 97110 THERAPEUTIC EXERCISES: CPT

## 2019-06-20 NOTE — THERAPY TREATMENT NOTE
Outpatient Physical Therapy Ortho Treatment Note  [facility]     Patient Name: Brunilda Horner  : 1945  MRN: [id number]  Today's Date: 2019      Visit Date: 2019     Subjective Improvement little better  Visits 6/6  Visits approved medicare cap  RTMD 2019  Recert Date 2019    S/P right KRISTIN on 2019  Post op 3 weeks 1 day      Visit Dx:    ICD-10-CM ICD-9-CM   1. Primary osteoarthritis of right hip M16.11 715.15   2. Status post total replacement of right hip Z96.641 V43.64       Patient Active Problem List   Diagnosis   • Generalized anxiety disorder   • Chronic depression   • Chronic pain of right hip   • Bilateral primary osteoarthritis of hip   • Disorder of SI (sacroiliac) joint   • Primary osteoarthritis of right hip   • Other hyperlipidemia   • Essential hypertension   • Status post total replacement of right hip        Past Medical History:   Diagnosis Date   • Anxiety     Generalized anxiety disorder    2015    • Arthritis    • Blepharitis 2014   • Cellulitis of foot 2016   • Depression    • History of Papanicolaou smear of cervix 2009    negative   • Hypertension    • Osteopenia    • Pure hypercholesterolemia 2015        Past Surgical History:   Procedure Laterality Date   • BREAST BIOPSY     • BREAST SURGERY  1994    Excision of mass, left breast. Mass, left breast   • COLONOSCOPY  2010    no polyps   • FOOT SURGERY  2001    Removal of tumor, radical resection of soft tissue, right foot. Recurrent plantar fibroma, plantar, right foot   • MAMMO STEREOTACTIC BREAST BIOPSY 1ST W WO DEVICE  1988    Needle localization and excision of micro-calcifications. Micro-calcifications, left breast   • SKIN BIOPSY  1991    Tangential excision keratoses left cheek. Exicison 25 papilloma, neck.   • TOTAL HIP ARTHROPLASTY Right 2019    Procedure: TOTAL HIP ARTHROPLASTY ANTERIOR - right;  Surgeon: Shady Canales  MD Terrell;  Location: [facility];  Service: Orthopedics       PT Ortho     Row Name 06/20/19 0900       Precautions and Contraindications    Precautions  R ant approach on 5-  -CP    Contraindications  3 weeks 1 day post op  -CP       Posture/Observations    Posture/Observations Comments  amb with SC  -CP    Row Name 06/18/19 1500       Precautions and Contraindications    Precautions  R ant approach 5-  -CP    Contraindications  2 weeks 6 days post op  -CP       Posture/Observations    Posture/Observations Comments  amb with rollator  -CP      User Key  (r) = Recorded By, (t) = Taken By, (c) = Cosigned By    Initials Name Provider Type    Freda Raines, PTA Physical Therapy Assistant                      PT Assessment/Plan     Row Name 06/20/19 1052          PT Assessment    Assessment Comments  Patient was able to amb stairs ascending and descending with SC with recipitical gait pattern.  Cont with TTP with right quad  -CP        PT Plan    PT Frequency  2x/week;3x/week  -CP     Predicted Duration of Therapy Intervention (Therapy Eval)  6 weeks  -CP     PT Plan Comments  Cont with POC.  Recheck next week.  prone quad stretch.  possible MFR to right ITB  -CP       User Key  (r) = Recorded By, (t) = Taken By, (c) = Cosigned By    Initials Name Provider Type    Freda Raines, ADAIR Physical Therapy Assistant          Modalities     Row Name 06/20/19 0900             Moist Heat    MH Applied  Yes  -CP      Location  right quad  -CP      Rx Minutes  15 mins  -CP      MH S/P Rx  Yes  -CP        User Key  (r) = Recorded By, (t) = Taken By, (c) = Cosigned By    Initials Name Provider Type    Freda Raines PTA Physical Therapy Assistant        Exercises     Row Name 06/20/19 0900             Subjective Comments    Subjective Comments  hip feels the same no change.  she is amb with cane because her  didnt want to put her rollator in the car.  -CP         Subjective Pain    Able to  "rate subjective pain?  yes  -CP      Pre-Treatment Pain Level  2  -CP      Post-Treatment Pain Level  0  -CP         Exercise 1    Exercise Name 1  Pro II level 2  -CP      Time 1  10  -CP         Exercise 2    Exercise Name 2  incline stretch  -CP      Cueing 2  Verbal  -CP      Sets 2  3  -CP      Time 2  30  -CP         Exercise 3    Exercise Name 3  standing HS stretch  -CP      Cueing 3  Verbal  -CP      Sets 3  3  -CP      Time 3  30  -CP         Exercise 4    Exercise Name 4  lunge stretch  -CP      Cueing 4  Verbal  -CP      Sets 4  3  -CP      Time 4  30  -CP         Exercise 5    Exercise Name 5  CR/TR  -CP      Cueing 5  Verbal  -CP      Sets 5  2  -CP      Reps 5  10  -CP         Exercise 6    Exercise Name 6  step up 4\"  -CP      Cueing 6  Verbal  -CP      Sets 6  2  -CP      Reps 6  10  -CP         Exercise 7    Exercise Name 7  stairs  -CP      Reps 7  1 flight  -CP      Time 7  recipitcal gait with SC  -CP         Exercise 8    Exercise Name 8  lat step 4\"  -CP      Cueing 8  Verbal;Demo  -CP      Sets 8  2  -CP      Reps 8  10  -CP      Time 8  independent  -CP         Exercise 9    Exercise Name 9  mini squats  -CP      Cueing 9  Verbal;Demo  -CP      Sets 9  2  -CP      Reps 9  10  -CP         Exercise 10    Exercise Name 10  LAQ hip AB squeezes  -CP      Cueing 10  Verbal;Demo  -CP      Sets 10  2  -CP      Reps 10  10  -CP      Time 10  1 lb  -CP         Exercise 11    Exercise Name 11  clamshells  -CP      Cueing 11  Verbal;Tactile  -CP      Sets 11  2  -CP      Reps 11  10  -CP         Exercise 12    Exercise Name 12  see manual  -CP        User Key  (r) = Recorded By, (t) = Taken By, (c) = Cosigned By    Initials Name Provider Type    CP Freda Romero, PTA Physical Therapy Assistant                      Manual Rx (last 36 hours)      Manual Treatments     Row Name 06/20/19 0900             Manual Rx 1    Manual Rx 1 Location  right quad  -CP      Manual Rx 1 Type  MFR/STM  -CP      Manual " Rx 1 Duration  9  -CP        User Key  (r) = Recorded By, (t) = Taken By, (c) = Cosigned By    Initials Name Provider Type    Freda Raines, ADAIR Physical Therapy Assistant          PT OP Goals     Row Name 06/20/19 0900          PT Short Term Goals    STG Date to Achieve  06/25/19  -CP     STG 1  Note a >/= 50% subjective improvement.  -CP     STG 1 Progress  Progressing  -CP     STG 2  Demonstrate ability to ambulate household distances with cane.  -CP     STG 2 Progress  Met  -CP     STG 3  R knee active extension to 0 degrees.   -CP     STG 3 Progress  Met  -CP     STG 4  R hip active flexion to be >/= 100 degrees.  -CP     STG 4 Progress  Progressing  -CP     STG 5  LEFS score to be >/= 25/80.  -CP     STG 5 Progress  Not Met  -CP        Long Term Goals    LTG Date to Achieve  07/16/19  -CP     LTG 1  Independent with HEP/self-management.  -CP     LTG 1 Progress  Ongoing  -CP     LTG 2  Demonstrate ability to ambulate community distances without assistive device.  -CP     LTG 2 Progress  Not Met  -CP     LTG 3  LEFS score to be >/= 40/80.  -CP     LTG 3 Progress  Not Met  -CP     LTG 4  Demonstrate ability to ascend 5 stairs reciprocally with 1 handrail.  -CP     LTG 4 Progress  Not Met  -CP        Time Calculation    PT Goal Re-Cert Due Date  06/25/19  -CP       User Key  (r) = Recorded By, (t) = Taken By, (c) = Cosigned By    Initials Name Provider Type    Freda Raines PTA Physical Therapy Assistant          Therapy Education  Education Details: standing lunge stretch, clamshell  Given: HEP  Program: New  How Provided: Verbal, Demonstration  Provided to: Patient  Level of Understanding: Teach back education performed, Verbalized, Demonstrated              Time Calculation:   Start Time: 0930  Stop Time: 1045  Time Calculation (min): 75 min  Total Timed Code Minutes- PT: 60 minute(s)  Therapy Charges for Today     Code Description Service Date Service Provider Modifiers Qty    [id number]  PT  THER SUPP EA 15 MIN 6/20/2019 Freda Romero, ADAIR GP 1    51626606735 HC PT THER PROC EA 15 MIN 6/20/2019 Freda Romero, ADAIR GP 4                    Freda Romero PTA  6/20/2019

## 2019-06-24 RX ORDER — OXYCODONE AND ACETAMINOPHEN 7.5; 325 MG/1; MG/1
1 TABLET ORAL EVERY 4 HOURS PRN
Qty: 18 TABLET | Refills: 0 | Status: SHIPPED | OUTPATIENT
Start: 2019-06-24 | End: 2019-06-28 | Stop reason: SDUPTHER

## 2019-06-24 NOTE — TELEPHONE ENCOUNTER
Regarding: Prescription Question  Contact: [phone]  ----- Message from [employer], Generic sent at 6/24/2019  9:17 AM EDT -----    I need my pain Med refilled. My pharmacy is [facility].....Let me know if I need to  a hard copy.    Thank you  Brunilda DE LEÓN 1945  Phone  [phone]

## 2019-06-25 ENCOUNTER — ANTICOAGULATION VISIT (OUTPATIENT)
Dept: PHARMACY | Facility: HOSPITAL | Age: 74
End: 2019-06-25

## 2019-06-25 ENCOUNTER — LAB (OUTPATIENT)
Dept: LAB | Facility: HOSPITAL | Age: 74
End: 2019-06-25

## 2019-06-25 ENCOUNTER — HOSPITAL ENCOUNTER (OUTPATIENT)
Dept: PHYSICAL THERAPY | Facility: HOSPITAL | Age: 74
Setting detail: THERAPIES SERIES
Discharge: HOME | End: 2019-06-25
Attending: ORTHOPAEDIC SURGERY

## 2019-06-25 DIAGNOSIS — M16.11 PRIMARY OSTEOARTHRITIS OF RIGHT HIP: Primary | ICD-10-CM

## 2019-06-25 DIAGNOSIS — Z96.641 STATUS POST TOTAL REPLACEMENT OF RIGHT HIP: ICD-10-CM

## 2019-06-25 DIAGNOSIS — Z79.01 LONG TERM (CURRENT) USE OF ANTICOAGULANTS: ICD-10-CM

## 2019-06-25 LAB
INR PPP: 1.42 (ref 0.8–1.2)
PROTHROMBIN TIME: 17.1 SECONDS (ref 11.1–15.3)

## 2019-06-25 PROCEDURE — 85610 PROTHROMBIN TIME: CPT

## 2019-06-25 PROCEDURE — 97110 THERAPEUTIC EXERCISES: CPT

## 2019-06-25 PROCEDURE — 36415 COLL VENOUS BLD VENIPUNCTURE: CPT

## 2019-06-25 NOTE — PROGRESS NOTES
Spoke with Brunilda Avendaño. Reviewed current lab.  No s/s of bleeding. No new meds. No missed doses. Reviewed schedule for following week. Pt read back regimen and verbalized understanding. All questions answered. Next INR on 07/02 provided by FLIP Nelson.    Freda Dugan [facility]  06/25/19  6:02 PM

## 2019-06-25 NOTE — THERAPY DISCHARGE NOTE
Outpatient Physical Therapy Ortho Treatment Note/Discharge Summary  [facility]     Patient Name: Brunilda Horner  : 1945  MRN: [id number]  Today's Date: 2019      Visit Date: 2019     Subjective Improvement 90%  Visits 7/  Visits approved medicare cap  RTMD 2019  recert Date 2019    S/P Right KRISTIN on 2019  Post op 3 weeks 6 days    Visit Dx:    ICD-10-CM ICD-9-CM   1. Primary osteoarthritis of right hip M16.11 715.15   2. Status post total replacement of right hip Z96.641 V43.64       Patient Active Problem List   Diagnosis   • Generalized anxiety disorder   • Chronic depression   • Chronic pain of right hip   • Bilateral primary osteoarthritis of hip   • Disorder of SI (sacroiliac) joint   • Primary osteoarthritis of right hip   • Other hyperlipidemia   • Essential hypertension   • Status post total replacement of right hip        Past Medical History:   Diagnosis Date   • Anxiety     Generalized anxiety disorder    2015    • Arthritis    • Blepharitis 2014   • Cellulitis of foot 2016   • Depression    • History of Papanicolaou smear of cervix 2009    negative   • Hypertension    • Osteopenia    • Pure hypercholesterolemia 2015        Past Surgical History:   Procedure Laterality Date   • BREAST BIOPSY     • BREAST SURGERY  1994    Excision of mass, left breast. Mass, left breast   • COLONOSCOPY  2010    no polyps   • FOOT SURGERY  2001    Removal of tumor, radical resection of soft tissue, right foot. Recurrent plantar fibroma, plantar, right foot   • MAMMO STEREOTACTIC BREAST BIOPSY 1ST W WO DEVICE  1988    Needle localization and excision of micro-calcifications. Micro-calcifications, left breast   • SKIN BIOPSY  1991    Tangential excision keratoses left cheek. Exicison 25 papilloma, neck.   • TOTAL HIP ARTHROPLASTY Right 2019    Procedure: TOTAL HIP ARTHROPLASTY ANTERIOR - right;  Surgeon: Shady Canales  "MD Terrell;  Location: [facility];  Service: Orthopedics                       PT Assessment/Plan     Row Name 06/25/19 0366          PT Assessment    Assessment Comments  Patient has progressed very well.  All goals have been met.    -CP        PT Plan    PT Plan Comments  D/C to home with HEP.  Patient is a fitness member and will come on her own to ride the pro II  -CP       User Key  (r) = Recorded By, (t) = Taken By, (c) = Cosigned By    Initials Name Provider Type    Freda Raines PTA Physical Therapy Assistant              Exercises     Row Name 06/25/19 1400             Subjective Comments    Subjective Comments  Patient states that she is doing very well.  She believes that she can do the ex on her own at home.  she is also a fitness members  -CP         Subjective Pain    Able to rate subjective pain?  yes  -CP      Pre-Treatment Pain Level  0  -CP      Post-Treatment Pain Level  0  -CP         Exercise 1    Exercise Name 1  Pro II level 3  -CP      Time 1  10  -CP         Exercise 2    Exercise Name 2  incline stretch  -CP      Cueing 2  Verbal  -CP      Sets 2  3  -CP      Time 2  30  -CP         Exercise 3    Exercise Name 3  standing HS stretch  -CP      Cueing 3  Verbal  -CP      Sets 3  3  -CP      Time 3  30  -CP         Exercise 4    Exercise Name 4  step up 6\"  -CP      Cueing 4  Verbal  -CP      Sets 4  2  -CP      Reps 4  10  -CP         Exercise 5    Exercise Name 5  lat step up 4\"  -CP      Cueing 5  Verbal  -CP      Sets 5  2  -CP      Reps 5  10  -CP         Exercise 6    Exercise Name 6  mini squats  -CP      Cueing 6  Verbal  -CP      Sets 6  2  -CP      Reps 6  10  -CP         Exercise 7    Exercise Name 7  stairs  -CP      Reps 7  1 flight  -CP      Time 7  recipitcal gait ascending and descending  -CP         Exercise 8    Exercise Name 8  Review HEP  -CP        User Key  (r) = Recorded By, (t) = Taken By, (c) = Cosigned By    Initials Name Provider Type    Freda Raines" SERA, PTA Physical Therapy Assistant                         PT OP Goals     Row Name 06/25/19 1300          PT Short Term Goals    STG Date to Achieve  06/25/19  -CP     STG 1  Note a >/= 50% subjective improvement.  -CP     STG 1 Progress  Met  -CP     STG 2  Demonstrate ability to ambulate household distances with cane.  -CP     STG 2 Progress  Met  -CP     STG 3  R knee active extension to 0 degrees.   -CP     STG 3 Progress  Met  -CP     STG 4  R hip active flexion to be >/= 100 degrees.  -CP     STG 4 Progress  Met  -CP     STG 5  LEFS score to be >/= 25/80.  -CP     STG 5 Progress  Met  -CP        Long Term Goals    LTG Date to Achieve  07/16/19  -CP     LTG 1  Independent with HEP/self-management.  -CP     LTG 1 Progress  Met  -CP     LTG 2  Demonstrate ability to ambulate community distances without assistive device.  -CP     LTG 2 Progress  Met  -CP     LTG 3  LEFS score to be >/= 40/80.  -CP     LTG 3 Progress  Met  -CP     LTG 4  Demonstrate ability to ascend 5 stairs reciprocally with 1 handrail.  -CP     LTG 4 Progress  Met  -CP        Time Calculation    PT Goal Re-Cert Due Date  06/25/19  -CP       User Key  (r) = Recorded By, (t) = Taken By, (c) = Cosigned By    Initials Name Provider Type    Freda Raines, ADAIR Physical Therapy Assistant          Therapy Education  Education Details: MIHAI sue  Given: HEP  Program: Reinforced  How Provided: Verbal, Written  Provided to: Patient  Level of Understanding: Verbalized    Outcome Measure Options: Lower Extremity Functional Scale (LEFS)  Lower Extremity Functional Index  Any of your usual work, housework or school activities: A little bit of difficulty  Your usual hobbies, recreational or sporting activities: A little bit of difficulty  Getting into or out of the bath: No difficulty  Walking between rooms: No difficulty  Putting on your shoes or socks: No difficulty  Squatting: No difficulty  Lifting an object, like a bag of groceries from the  floor: No difficulty  Performing light activities around your home: No difficulty  Performing heavy activities around your home: A little bit of difficulty  Getting into or out of a car: No difficulty  Walking 2 blocks: A little bit of difficulty  Walking a mile: A little bit of difficulty  Going up or down 10 stairs (about 1 flight of stairs): A little bit of difficulty  Standing for 1 hour: A little bit of difficulty  Sitting for 1 hour: Moderate difficulty  Running on even ground: Extreme difficulty or unable to perform activity  Running on uneven ground: Extreme difficulty or unable to perform activity  Making sharp turns while running fast: Extreme difficulty or unable to perform activity  Hopping: Extreme difficulty or unable to perform activity  Rolling over in bed: Extreme difficulty or unable to perform activity  Total: 51      Time Calculation:   Start Time: 1345  Stop Time: 1423  Time Calculation (min): 38 min  Total Timed Code Minutes- PT: 38 minute(s)  Therapy Charges for Today     Code Description Service Date Service Provider Modifiers Qty    [id number] HC PT THER PROC EA 15 MIN 6/25/2019 Freda Romero, PTA GP 3          PT G-Codes  Outcome Measure Options: Lower Extremity Functional Scale (LEFS)  Total: 51     OP PT Discharge Summary  Date of Discharge: 06/25/19  Reason for Discharge: All goals achieved, Patient/Caregiver request  Outcomes Achieved: Able to achieve all goals within established timeline  Discharge Destination: Home with home program  Discharge Instructions/Additional Comments: Patient is a fitness member and will come on her own.      Freda Romero PTA  6/25/2019

## 2019-06-26 DIAGNOSIS — M16.0 BILATERAL PRIMARY OSTEOARTHRITIS OF HIP: Chronic | ICD-10-CM

## 2019-06-26 RX ORDER — TIZANIDINE 4 MG/1
4 TABLET ORAL NIGHTLY PRN
Qty: 30 TABLET | Refills: 5 | Status: SHIPPED | OUTPATIENT
Start: 2019-06-26 | End: 2020-06-02

## 2019-06-27 ENCOUNTER — APPOINTMENT (OUTPATIENT)
Dept: PHYSICAL THERAPY | Facility: HOSPITAL | Age: 74
End: 2019-06-27
Attending: ORTHOPAEDIC SURGERY

## 2019-06-27 ENCOUNTER — OFFICE VISIT (OUTPATIENT)
Dept: ORTHOPEDIC SURGERY | Facility: CLINIC | Age: 74
End: 2019-06-27

## 2019-06-27 VITALS — WEIGHT: 139.7 LBS | HEIGHT: 66 IN | BODY MASS INDEX: 22.45 KG/M2

## 2019-06-27 DIAGNOSIS — M16.11 PRIMARY OSTEOARTHRITIS OF RIGHT HIP: Primary | ICD-10-CM

## 2019-06-27 DIAGNOSIS — G89.29 CHRONIC PAIN OF RIGHT HIP: ICD-10-CM

## 2019-06-27 DIAGNOSIS — Z96.641 STATUS POST TOTAL REPLACEMENT OF RIGHT HIP: ICD-10-CM

## 2019-06-27 DIAGNOSIS — M25.551 CHRONIC PAIN OF RIGHT HIP: ICD-10-CM

## 2019-06-27 PROCEDURE — 99024 POSTOP FOLLOW-UP VISIT: CPT | Performed by: NURSE PRACTITIONER

## 2019-07-01 RX ORDER — OXYCODONE AND ACETAMINOPHEN 7.5; 325 MG/1; MG/1
1 TABLET ORAL EVERY 6 HOURS PRN
Qty: 12 TABLET | Refills: 0 | Status: SHIPPED | OUTPATIENT
Start: 2019-07-01 | End: 2019-07-02 | Stop reason: SDUPTHER

## 2019-07-02 ENCOUNTER — TELEPHONE (OUTPATIENT)
Dept: ORTHOPEDIC SURGERY | Facility: CLINIC | Age: 74
End: 2019-07-02

## 2019-07-02 ENCOUNTER — LAB (OUTPATIENT)
Dept: LAB | Facility: HOSPITAL | Age: 74
End: 2019-07-02

## 2019-07-02 ENCOUNTER — ANTICOAGULATION VISIT (OUTPATIENT)
Dept: PHARMACY | Facility: HOSPITAL | Age: 74
End: 2019-07-02

## 2019-07-02 DIAGNOSIS — Z79.01 LONG TERM (CURRENT) USE OF ANTICOAGULANTS: ICD-10-CM

## 2019-07-02 LAB
INR PPP: 1.36 (ref 0.8–1.2)
PROTHROMBIN TIME: 16.6 SECONDS (ref 11.1–15.3)

## 2019-07-02 PROCEDURE — 36415 COLL VENOUS BLD VENIPUNCTURE: CPT

## 2019-07-02 PROCEDURE — 85610 PROTHROMBIN TIME: CPT

## 2019-07-02 RX ORDER — OXYCODONE AND ACETAMINOPHEN 7.5; 325 MG/1; MG/1
1 TABLET ORAL EVERY 8 HOURS PRN
Qty: 30 TABLET | Refills: 0 | Status: SHIPPED | OUTPATIENT
Start: 2019-07-02 | End: 2019-07-15 | Stop reason: SDUPTHER

## 2019-07-02 NOTE — PROGRESS NOTES
Spoke with pt. Reviewed current lab.  No s/s of bleeding. No new meds. No missed doses. Reviewed schedule for following week. Pt read back regimen and verbalized understanding. All questions answered. No further INRs therapy completes 7/10    Blas Farias [facility]  07/02/19  4:39 PM

## 2019-07-02 NOTE — TELEPHONE ENCOUNTER
Regarding: Prescription Question  Contact: [phone]  ----- Message from [employer], Generic sent at 7/2/2019  3:18 PM EDT -----    Since Dena can only send a refill for 12, I'm inquiring if Dr. Morales's would write me one for 30 that I could  Friday.   Thank you for being so prompt. It is much appreciated.    Brunilda Horner

## 2019-07-10 ENCOUNTER — TELEPHONE (OUTPATIENT)
Dept: PHARMACY | Facility: HOSPITAL | Age: 74
End: 2019-07-10

## 2019-07-10 NOTE — TELEPHONE ENCOUNTER
Spoke with Ms. Horner.  Today is the last day of warfarin therapy, instructed to take last dose of 2.5 mg tonight.  Counseled on discarding warfarin.  Patient findings otherwise negative.  All questions answered.

## 2019-07-15 DIAGNOSIS — Z96.641 STATUS POST TOTAL REPLACEMENT OF RIGHT HIP: ICD-10-CM

## 2019-07-15 DIAGNOSIS — G89.29 CHRONIC PAIN OF RIGHT HIP: ICD-10-CM

## 2019-07-15 DIAGNOSIS — M16.11 PRIMARY OSTEOARTHRITIS OF RIGHT HIP: Primary | ICD-10-CM

## 2019-07-15 DIAGNOSIS — M25.551 CHRONIC PAIN OF RIGHT HIP: ICD-10-CM

## 2019-07-15 NOTE — TELEPHONE ENCOUNTER
Please send a refill request to Dr. Canales. He is her surgeon and my prescriptive authority for Percocet is limited. Thanks.

## 2019-07-15 NOTE — TELEPHONE ENCOUNTER
Regarding: Prescription Question  Contact: [phone]  ----- Message from [employer], Generic sent at 7/15/2019 11:30 AM EDT -----    Good morning,  I need my pain pills refilled....my pharmacy is [facility].    Thank you,  Brunilda Horner   [phone]  [birth year]

## 2019-07-16 RX ORDER — OXYCODONE AND ACETAMINOPHEN 7.5; 325 MG/1; MG/1
1 TABLET ORAL EVERY 8 HOURS PRN
Qty: 30 TABLET | Refills: 0 | Status: SHIPPED | OUTPATIENT
Start: 2019-07-16 | End: 2019-07-25 | Stop reason: SDUPTHER

## 2019-08-07 DIAGNOSIS — M16.11 PRIMARY OSTEOARTHRITIS OF RIGHT HIP: Primary | ICD-10-CM

## 2019-08-07 DIAGNOSIS — Z96.641 STATUS POST TOTAL REPLACEMENT OF RIGHT HIP: ICD-10-CM

## 2019-08-08 ENCOUNTER — OFFICE VISIT (OUTPATIENT)
Dept: ORTHOPEDIC SURGERY | Facility: CLINIC | Age: 74
End: 2019-08-08

## 2019-08-08 VITALS — HEIGHT: 66 IN | WEIGHT: 137.9 LBS | BODY MASS INDEX: 22.16 KG/M2

## 2019-08-08 DIAGNOSIS — Z96.641 PRESENCE OF RIGHT ARTIFICIAL HIP JOINT: ICD-10-CM

## 2019-08-08 DIAGNOSIS — M16.11 PRIMARY OSTEOARTHRITIS OF RIGHT HIP: Primary | ICD-10-CM

## 2019-08-08 DIAGNOSIS — I10 ESSENTIAL HYPERTENSION: ICD-10-CM

## 2019-08-08 PROCEDURE — 99024 POSTOP FOLLOW-UP VISIT: CPT | Performed by: ORTHOPAEDIC SURGERY

## 2019-08-08 NOTE — PROGRESS NOTES
Brunilda Horner is a 74 y.o. female is s/p       Chief Complaint   Patient presents with   • Right Hip - Follow-up       HISTORY OF PRESENT ILLNESS: f/u right total hip arthroplasty done on 5/29/2019. xrays done today.   No problems  No fever or chills  No leg length discrepancy       No Known Allergies      Current Outpatient Medications:   •  atorvastatin (LIPITOR) 10 MG tablet, Take 10 mg by mouth Every Night., Disp: , Rfl:   •  buPROPion XL (WELLBUTRIN XL) 300 MG 24 hr tablet, Take 1 tablet by mouth Daily., Disp: 90 tablet, Rfl: 1  •  Calcium Carbonate-Vitamin D (CALTRATE 600+D PO), Take  by mouth 2 (Two) Times a Day., Disp: , Rfl:   •  cholecalciferol (VITAMIN D3) 1000 UNITS tablet, Take 1,000 Units by mouth daily., Disp: , Rfl:   •  escitalopram (LEXAPRO) 10 MG tablet, Take 1 tablet by mouth Daily., Disp: 90 tablet, Rfl: 1  •  lisinopril (PRINIVIL,ZESTRIL) 10 MG tablet, Take 1 tablet by mouth Daily., Disp: 90 tablet, Rfl: 1  •  Multiple Vitamins-Minerals (ICAPS AREDS 2 PO), Take  by mouth 2 (Two) Times a Day., Disp: , Rfl:   •  Multiple Vitamins-Minerals (MULTIVITAMIN ADULT) tablet, Take 1 tablet by mouth Daily., Disp: , Rfl:   •  Omega-3 Fatty Acids (OMEGA-3 PO), Take  by mouth Daily., Disp: , Rfl:   •  temazepam (RESTORIL) 30 MG capsule, Take 1 capsule by mouth At Night As Needed for Sleep., Disp: 30 capsule, Rfl: 5  •  tiZANidine (ZANAFLEX) 4 MG tablet, Take 1 tablet by mouth At Night As Needed for Muscle Spasms., Disp: 30 tablet, Rfl: 5  •  vitamin E 400 UNIT capsule, Take 400 Units by mouth daily., Disp: , Rfl:   •  oxyCODONE-acetaminophen (PERCOCET) 7.5-325 MG per tablet, Take 1 tablet by mouth Every 8 (Eight) Hours As Needed for Moderate Pain ., Disp: 30 tablet, Rfl: 0    No fevers or chills.  No nausea or vomiting.      PHYSICAL EXAMINATION:       Brunilda Horner is a 74 y.o. female    Patient is awake and alert, answers questions appropriately and is in no apparent distress.    GAIT:     [x]   Normal  []  Antalgic    Assistive device: [x]  None  []  Walker     []  Crutches  []  Cane     []  Wheelchair  []  Stretcher    Right Hip Exam     Tenderness   The patient is experiencing no tenderness.     Range of Motion   Flexion: 120     Muscle Strength   Flexion: 5/5     Tests   BROWN: negative  Fadir:  Negative FADIR test    Other   Scars: present  Sensation: normal  Pulse: present              Xr Hip With Or Without Pelvis 2 - 3 View Right    Result Date: 8/8/2019  Narrative: Ordering Provider:  Shady Canales MD Ordering Diagnosis/Indication:  Primary osteoarthritis of right hip, Status post total replacement of right hip Procedure:  XR HIP W OR WO PELVIS 2-3 VIEW RIGHT Exam Date:  8/8/19 COMPARISON:  Todays X-rays were compared to previous images dated June 13, 2019.     Impression:  AP standing of the pelvis with AP and lateral of the right hip show acceptable position and alignment of a right total hip arthroplasty.  No sign of implant loosening or failure is noted.  There is very slight pelvic obliquity which could represent a leg length discrepancy or could represent degenerative change in the lumbar spine.  No acute bony abnormality and no interval change from prior x-ray. Shady Canales MD 8/8/19           ASSESSMENT:    Diagnoses and all orders for this visit:    Primary osteoarthritis of right hip    Presence of right artificial hip joint    Essential hypertension          PLAN    Advance as tolerated   Continue strength and conditioning  Activity as tolerated.  No restrictions f/u 1 year.    Return in about 1 year (around 8/8/2020) for Recheck with repeat xrays.    Shady Canales MD

## 2019-10-17 ENCOUNTER — OFFICE VISIT (OUTPATIENT)
Dept: ORTHOPEDIC SURGERY | Facility: CLINIC | Age: 74
End: 2019-10-17

## 2019-10-17 VITALS — HEIGHT: 66 IN | BODY MASS INDEX: 23.42 KG/M2 | WEIGHT: 145.7 LBS

## 2019-10-17 DIAGNOSIS — M79.642 LEFT HAND PAIN: Primary | ICD-10-CM
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
## 2019-10-17 DIAGNOSIS — M25.532 LEFT WRIST PAIN: ICD-10-CM

## 2019-10-17 PROCEDURE — 99214 OFFICE O/P EST MOD 30 MIN: CPT | Performed by: NURSE PRACTITIONER

## 2019-10-17 NOTE — PROGRESS NOTES
Brunilda Horner is a 74 y.o. female   Primary provider:  Jesus Krause MD       Chief Complaint   Patient presents with   • Left Hand - Pain   • Left Wrist - Pain   • Establish Care       HISTORY OF PRESENT ILLNESS:     74-year-old female patient presents to office for evaluation of left hand/wrist pain/injury.  Initial injury occurred on 10/14/2019 due to a fall.  Patient describes that she fell off of a step onto her garage concrete floor.  Patient was evaluated at [facility] on 10/15/2019 with x-rays performed.  There was some concern for a possible trapezius fracture.  She was placed in a Velcro thumb spica splint and prescribed Norco as needed for pain.  Pain is described as intermittent and mild in severity.  Pain is described as aching in nature.  No swelling or bruising reported.  Pain is worse with palpation (mildly) and use of her left hand/wrist.  Pain improves with rest, splinting and pain medication.      Pain   This is a new problem. The current episode started in the past 7 days (10/15/2019). The problem occurs intermittently. The problem has been gradually improving. Associated symptoms include arthralgias. Pertinent negatives include no joint swelling, numbness or weakness. Associated symptoms comments: Aching pain. Exacerbated by: palpation, movement/use of left hand/wrist. She has tried immobilization, rest and oral narcotics for the symptoms. The treatment provided moderate relief.        CONCURRENT MEDICAL HISTORY:    Past Medical History:   Diagnosis Date   • Anxiety     Generalized anxiety disorder    12/30/2015    • Arthritis    • Blepharitis 06/12/2014   • Cellulitis of foot 07/12/2016   • Depression    • History of Papanicolaou smear of cervix 06/03/2009    negative   • Hypertension    • Osteopenia    • Pure hypercholesterolemia 12/30/2015       No Known Allergies      Current Outpatient Medications:   •  atorvastatin (LIPITOR) 10 MG tablet, Take 10 mg by mouth Every  Night., Disp: , Rfl:   •  buPROPion XL (WELLBUTRIN XL) 300 MG 24 hr tablet, Take 1 tablet by mouth Daily., Disp: 90 tablet, Rfl: 1  •  Calcium Carbonate-Vitamin D (CALTRATE 600+D PO), Take  by mouth 2 (Two) Times a Day., Disp: , Rfl:   •  cholecalciferol (VITAMIN D3) 1000 UNITS tablet, Take 1,000 Units by mouth daily., Disp: , Rfl:   •  escitalopram (LEXAPRO) 10 MG tablet, Take 1 tablet by mouth Daily., Disp: 90 tablet, Rfl: 1  •  HYDROcodone-acetaminophen (NORCO) 7.5-325 MG per tablet, Take 1 tablet by mouth Every 6 (Six) Hours As Needed for Moderate Pain ., Disp: 12 tablet, Rfl: 0  •  lisinopril (PRINIVIL,ZESTRIL) 10 MG tablet, Take 1 tablet by mouth Daily., Disp: 90 tablet, Rfl: 1  •  Multiple Vitamins-Minerals (ICAPS AREDS 2 PO), Take  by mouth 2 (Two) Times a Day., Disp: , Rfl:   •  Multiple Vitamins-Minerals (MULTIVITAMIN ADULT) tablet, Take 1 tablet by mouth Daily., Disp: , Rfl:   •  Omega-3 Fatty Acids (OMEGA-3 PO), Take  by mouth Daily., Disp: , Rfl:   •  temazepam (RESTORIL) 30 MG capsule, Take 1 capsule by mouth At Night As Needed for Sleep., Disp: 30 capsule, Rfl: 5  •  tiZANidine (ZANAFLEX) 4 MG tablet, Take 1 tablet by mouth At Night As Needed for Muscle Spasms., Disp: 30 tablet, Rfl: 5  •  vitamin E 400 UNIT capsule, Take 400 Units by mouth daily., Disp: , Rfl:     Past Surgical History:   Procedure Laterality Date   • BREAST BIOPSY     • BREAST SURGERY  05/31/1994    Excision of mass, left breast. Mass, left breast   • COLONOSCOPY  01/01/2010    no polyps   • FOOT SURGERY  05/08/2001    Removal of tumor, radical resection of soft tissue, right foot. Recurrent plantar fibroma, plantar, right foot   • MAMMO STEREOTACTIC BREAST BIOPSY 1ST W WO DEVICE  06/08/1988    Needle localization and excision of micro-calcifications. Micro-calcifications, left breast   • SKIN BIOPSY  06/06/1991    Tangential excision keratoses left cheek. Exicison 25 papilloma, neck.   • TOTAL HIP ARTHROPLASTY Right 5/29/2019     "Procedure: TOTAL HIP ARTHROPLASTY ANTERIOR - right;  Surgeon: Shady Canales MD;  Location: [facility];  Service: Orthopedics       Family History   Problem Relation Age of Onset   • Asthma Other    • Breast cancer Other    • Cancer Other    • Heart disease Other    • Lung disease Other    • Breast cancer Paternal Aunt         Social History     Socioeconomic History   • Marital status:      Spouse name: Not on file   • Number of children: Not on file   • Years of education: Not on file   • Highest education level: Not on file   Tobacco Use   • Smoking status: Former Smoker     Packs/day: 1.00     Years: 10.00     Pack years: 10.00     Last attempt to quit: 1976     Years since quittin.8   • Smokeless tobacco: Never Used   Substance and Sexual Activity   • Alcohol use: Yes     Alcohol/week: 1.2 oz     Types: 2 Glasses of wine per week   • Drug use: No   • Sexual activity: Defer        Review of Systems   Constitutional: Negative.    HENT: Negative.    Eyes: Negative.    Respiratory: Negative.    Cardiovascular: Negative.    Gastrointestinal: Negative.    Endocrine: Negative.    Genitourinary: Negative.    Musculoskeletal: Positive for arthralgias. Negative for joint swelling.        Left wrist/hand pain.    Skin: Negative.    Allergic/Immunologic: Negative.    Neurological: Negative.  Negative for weakness and numbness.   Hematological: Negative.    Psychiatric/Behavioral: Negative.        PHYSICAL EXAMINATION:       Ht 167.6 cm (66\")   Wt 66.1 kg (145 lb 11.2 oz)   LMP  (LMP Unknown)   BMI 23.52 kg/m²     Physical Exam   Constitutional: She is oriented to person, place, and time. Vital signs are normal. She appears well-developed and well-nourished. She is active and cooperative. She does not appear ill. No distress.   HENT:   Head: Normocephalic.   Pulmonary/Chest: Effort normal. No respiratory distress.   Abdominal: Soft. She exhibits no distension.   Musculoskeletal: She exhibits " tenderness (Mild, left wrist/hand). She exhibits no edema or deformity.   Neurological: She is alert and oriented to person, place, and time. GCS eye subscore is 4. GCS verbal subscore is 5. GCS motor subscore is 6.   Skin: Skin is warm, dry and intact. Capillary refill takes less than 2 seconds. No erythema.   Psychiatric: She has a normal mood and affect. Her speech is normal and behavior is normal. Judgment and thought content normal. Cognition and memory are normal.   Vitals reviewed.      GAIT:     [x]  Normal  []  Antalgic    Assistive device: [x]  None  []  Walker     []  Crutches  []  Cane     []  Wheelchair  []  Stretcher    Right Hand Exam     Tenderness   The patient is experiencing no tenderness.     Range of Motion   The patient has normal right wrist ROM.     Muscle Strength   The patient has normal right wrist strength.    Other   Erythema: absent  Sensation: normal  Pulse: present      Left Hand Exam     Tenderness   The patient is experiencing tenderness in the dorsal area (Mild).     Range of Motion   The patient has normal left wrist ROM.    Muscle Strength   Left wrist normal muscle strength: deferred.    Other   Erythema: absent  Sensation: normal  Pulse: present    Comments:  Mild pain with range of motion. No limitations in range of motion. No swelling. No ecchymosis. No erythema. No deformity. Skin is intact. Mild tenderness to the proximal dorsal hand. Capillary refill is less than 3 seconds.             Xr Wrist 3+ View Left    Result Date: 10/15/2019  Narrative: EXAM:  Radiograph(s), Osseous       REGION:   Wrist  SIDE:     Left   VIEWS:   3         INDICATION:    fell on concrete   COMPARISON:    none          FINDINGS:       No evidence of a fracture or bony malalignment.   No evidence of osteolytic/osteoblastic disease.   Age-related degenerative changes.                         .        Impression: CONCLUSION:       1. No radiographic evidence of acute osseous pathology.          Note:   if pain or symptoms persist beyond reasonable expectations and follow-up imaging is anticipated,  cross sectional imaging (CT and/or MRI) is suggested, as is deemed clinically appropriate.            Electronically signed by:  JUAN JOSÉ Wayne MD  10/15/2019 9:16 AM CDT Workstation: [id number]    ASSESSMENT:    Diagnoses and all orders for this visit:    Left hand pain    Left wrist pain    Fall with injury, initial encounter    PLAN    X-rays of the left wrist done on 10/15/2019 reviewed. The radiology interpretation is normal with no evidence of acute bony injury. However, there was some question about a possible fracture of the trapezius bone. There is a subtle cortical abnormality noted on one view that may represent an acute nondisplaced fracture. Patient has some mild tenderness that correlates with this but no swelling or bruising is noted. Recommend to continue with a thumb spica splint for conservative treatment and immobilization. A new/improved velcro thumb spica splint is placed/fitted in office today, which feels better to the patient. Recommend elevation and ice therapy as needed to minimize pain/swelling. Recommend Tylenol or Ibuprofen as needed for pain. Follow up in 2 weeks for recheck with repeat x-rays at that time.     Return in about 2 weeks (around 10/31/2019) for Recheck.        This document has been electronically signed by ARYA Sagastume on October 20, 2019 9:35 PM      ARYA Sagastume

## 2019-10-20 PROBLEM — M79.642 LEFT HAND PAIN: Status: ACTIVE | Noted: 2019-10-20
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
## 2019-10-20 PROBLEM — M25.532 LEFT WRIST PAIN: Status: ACTIVE | Noted: 2019-10-20

## 2019-10-30 DIAGNOSIS — M25.532 LEFT WRIST PAIN: Primary | ICD-10-CM

## 2019-10-31 ENCOUNTER — OFFICE VISIT (OUTPATIENT)
Dept: ORTHOPEDIC SURGERY | Facility: CLINIC | Age: 74
End: 2019-10-31

## 2019-10-31 VITALS — HEIGHT: 66 IN | BODY MASS INDEX: 23.46 KG/M2 | WEIGHT: 146 LBS

## 2019-10-31 DIAGNOSIS — M79.642 LEFT HAND PAIN: Primary | ICD-10-CM

## 2019-10-31 DIAGNOSIS — M25.532 LEFT WRIST PAIN: ICD-10-CM
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
## 2019-10-31 PROCEDURE — 99213 OFFICE O/P EST LOW 20 MIN: CPT | Performed by: NURSE PRACTITIONER

## 2019-11-13 DIAGNOSIS — E78.49 OTHER HYPERLIPIDEMIA: Chronic | ICD-10-CM

## 2019-11-13 RX ORDER — ATORVASTATIN CALCIUM 10 MG/1
TABLET, FILM COATED ORAL
Qty: 90 TABLET | Refills: 2 | OUTPATIENT
Start: 2019-11-13

## 2019-11-21 ENCOUNTER — OFFICE VISIT (OUTPATIENT)
Dept: FAMILY MEDICINE CLINIC | Facility: CLINIC | Age: 74
End: 2019-11-21

## 2019-11-21 VITALS
DIASTOLIC BLOOD PRESSURE: 82 MMHG | BODY MASS INDEX: 23.61 KG/M2 | HEIGHT: 66 IN | SYSTOLIC BLOOD PRESSURE: 140 MMHG | WEIGHT: 146.9 LBS

## 2019-11-21 DIAGNOSIS — Z12.31 VISIT FOR SCREENING MAMMOGRAM: ICD-10-CM

## 2019-11-21 DIAGNOSIS — I10 ESSENTIAL HYPERTENSION: Primary | Chronic | ICD-10-CM

## 2019-11-21 DIAGNOSIS — F41.1 GENERALIZED ANXIETY DISORDER: Chronic | ICD-10-CM

## 2019-11-21 DIAGNOSIS — E78.49 OTHER HYPERLIPIDEMIA: Chronic | ICD-10-CM

## 2019-11-21 DIAGNOSIS — F32.A CHRONIC DEPRESSION: Chronic | ICD-10-CM

## 2019-11-21 DIAGNOSIS — Z23 NEED FOR IMMUNIZATION AGAINST INFLUENZA: ICD-10-CM

## 2019-11-21 PROBLEM — M16.11 PRIMARY OSTEOARTHRITIS OF RIGHT HIP: Status: RESOLVED | Noted: 2018-07-10 | Resolved: 2019-11-21

## 2019-11-21 PROBLEM — M53.3 DISORDER OF SI (SACROILIAC) JOINT: Chronic | Status: ACTIVE | Noted: 2018-02-16

## 2019-11-21 PROBLEM — M25.532 LEFT WRIST PAIN: Status: RESOLVED | Noted: 2019-10-20 | Resolved: 2019-11-21

## 2019-11-21 PROBLEM — Z96.641 PRESENCE OF RIGHT ARTIFICIAL HIP JOINT: Chronic | Status: ACTIVE | Noted: 2019-08-08

## 2019-11-21 PROBLEM — Z96.641 STATUS POST TOTAL REPLACEMENT OF RIGHT HIP: Status: RESOLVED | Noted: 2019-06-13 | Resolved: 2019-11-21

## 2019-11-21 PROBLEM — M79.642 LEFT HAND PAIN: Status: RESOLVED | Noted: 2019-10-20 | Resolved: 2019-11-21
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
## 2019-11-21 PROCEDURE — G0008 ADMIN INFLUENZA VIRUS VAC: HCPCS | Performed by: FAMILY MEDICINE

## 2019-11-21 PROCEDURE — 99214 OFFICE O/P EST MOD 30 MIN: CPT | Performed by: FAMILY MEDICINE

## 2019-11-21 PROCEDURE — 90653 IIV ADJUVANT VACCINE IM: CPT | Performed by: FAMILY MEDICINE

## 2019-11-21 RX ORDER — LISINOPRIL 10 MG/1
10 TABLET ORAL DAILY
Qty: 90 TABLET | Refills: 1 | Status: SHIPPED | OUTPATIENT
Start: 2019-11-21 | End: 2020-05-21 | Stop reason: SDUPTHER

## 2019-11-21 RX ORDER — ESCITALOPRAM OXALATE 10 MG/1
10 TABLET ORAL DAILY
Qty: 90 TABLET | Refills: 1 | Status: SHIPPED | OUTPATIENT
Start: 2019-11-21 | End: 2020-05-21 | Stop reason: SDUPTHER

## 2019-11-21 RX ORDER — BUPROPION HYDROCHLORIDE 300 MG/1
300 TABLET ORAL DAILY
Qty: 90 TABLET | Refills: 1 | Status: SHIPPED | OUTPATIENT
Start: 2019-11-21 | End: 2020-05-21 | Stop reason: SDUPTHER

## 2019-11-21 RX ORDER — TEMAZEPAM 30 MG/1
30 CAPSULE ORAL NIGHTLY PRN
Qty: 30 CAPSULE | Refills: 5 | Status: SHIPPED | OUTPATIENT
Start: 2019-11-21 | End: 2020-05-21 | Stop reason: SDUPTHER

## 2019-11-21 RX ORDER — ATORVASTATIN CALCIUM 10 MG/1
10 TABLET, FILM COATED ORAL NIGHTLY
Qty: 90 TABLET | Refills: 3 | Status: SHIPPED | OUTPATIENT
Start: 2019-11-21 | End: 2020-08-17

## 2019-11-21 NOTE — PROGRESS NOTES
Subjective   Brunilda Horner is a 74 y.o. female.     History of Present Illness   follow-up hypertension hyperlipidemia mild chronic dysthymia chronic hip pain status post replacement.  In the interim blood pressures crept up slightly not been as active but now with her new hip she is being more active going back to fitness formula maintaining weight.  Last lab work within normal limits.  Does need a flu vaccine and a mammogram.  Not due for colonoscopy till next year.  Overall improved.  HPI    The following portions of the patient's history were reviewed and updated as appropriate: allergies, current medications, past family history, past medical history, past social history, past surgical history and problem list.    Review of Systems  Review of Systems   Constitutional: Negative for activity change, appetite change, fatigue and unexpected weight change.   HENT: Negative for trouble swallowing and voice change.    Eyes: Negative for redness and visual disturbance.   Respiratory: Negative for cough and wheezing.    Cardiovascular: Negative for chest pain and palpitations.   Gastrointestinal: Negative for abdominal pain, constipation, diarrhea, nausea and vomiting.   Genitourinary: Negative for urgency.   Musculoskeletal: Positive for arthralgias (Hip improved). Negative for joint swelling.   Neurological: Negative for syncope and headaches.   Hematological: Negative for adenopathy.   Psychiatric/Behavioral: Negative for sleep disturbance.       Objective   Physical Exam  Physical Exam   Constitutional: She is oriented to person, place, and time. She appears well-developed.   HENT:   Head: Normocephalic.   Right Ear: External ear normal.   Nose: Nose normal.   Eyes: Pupils are equal, round, and reactive to light.   Neck: Normal range of motion. No thyromegaly present.   Cardiovascular: Normal rate, regular rhythm, normal heart sounds and intact distal pulses. Exam reveals no gallop and no friction rub.   No murmur  "heard.  Pulmonary/Chest: Breath sounds normal.   Abdominal: Soft. She exhibits no distension and no mass. There is no tenderness.   Musculoskeletal: Normal range of motion.   It up and go 3 to 5 seconds slight antalgic gait   Neurological: She is alert and oriented to person, place, and time. She has normal reflexes.   Skin: Skin is warm and dry.   Psychiatric: She has a normal mood and affect. Her speech is normal and behavior is normal.         Visit Vitals  /82   Ht 167.6 cm (66\")   Wt 66.6 kg (146 lb 14.4 oz)   LMP  (LMP Unknown)   BMI 23.71 kg/m²     Body mass index is 23.71 kg/m².      Assessment/Plan   Brunilda was seen today for follow-up, sinus problem and headache.    Diagnoses and all orders for this visit:    Essential hypertension  -     lisinopril (PRINIVIL,ZESTRIL) 10 MG tablet; Take 1 tablet by mouth Daily.    Other hyperlipidemia    Generalized anxiety disorder    Chronic depression  -     escitalopram (LEXAPRO) 10 MG tablet; Take 1 tablet by mouth Daily.  -     temazepam (RESTORIL) 30 MG capsule; Take 1 capsule by mouth At Night As Needed for Sleep.  -     buPROPion XL (WELLBUTRIN XL) 300 MG 24 hr tablet; Take 1 tablet by mouth Daily.    Visit for screening mammogram  -     Mammo Screening Digital Tomosynthesis Bilateral With CAD    Need for immunization against influenza  -     Fluad Tri 65yr+    Other orders  -     atorvastatin (LIPITOR) 10 MG tablet; Take 1 tablet by mouth Every Night.    Counseled mainly on lifestyle measures diet exercise salt restriction infection control etc.  Continue medicines as outlined recheck 6 months  "

## 2019-12-20 ENCOUNTER — TELEPHONE (OUTPATIENT)
Dept: FAMILY MEDICINE CLINIC | Facility: CLINIC | Age: 74
End: 2019-12-20

## 2019-12-20 NOTE — TELEPHONE ENCOUNTER
I called and spoke with pt and let her know that I spoke with the [facility] where she had the mammogram done and that they have been calling her to set up an ultra sound for Monday they have her down to come in on  Monday dec the 23rd at 9am and that if she wanted that results of the mammogram she needed to call the [facility] and request results I gave her the number for there office

## 2019-12-20 NOTE — TELEPHONE ENCOUNTER
She called to get mammogram results-said had it done on Monday.She can be reached at [phone].

## 2020-02-12 RX ORDER — CLONIDINE HYDROCHLORIDE 0.1 MG/1
TABLET ORAL
Qty: 30 TABLET | Refills: 1 | Status: SHIPPED | OUTPATIENT
Start: 2020-02-12 | End: 2020-03-16

## 2020-03-16 ENCOUNTER — TELEPHONE (OUTPATIENT)
Dept: FAMILY MEDICINE CLINIC | Facility: CLINIC | Age: 75
End: 2020-03-16

## 2020-03-16 RX ORDER — CLONIDINE HYDROCHLORIDE 0.1 MG/1
TABLET ORAL
Qty: 30 TABLET | Refills: 0 | Status: SHIPPED | OUTPATIENT
Start: 2020-03-16 | End: 2020-05-01

## 2020-03-16 NOTE — TELEPHONE ENCOUNTER
Tracey arteaga [facility] called needing to speak with you about Ms. Horner getting a bone density test done.

## 2020-05-01 RX ORDER — CLONIDINE HYDROCHLORIDE 0.1 MG/1
TABLET ORAL
Qty: 30 TABLET | Refills: 0 | Status: SHIPPED | OUTPATIENT
Start: 2020-05-01 | End: 2020-06-04

## 2020-05-20 DIAGNOSIS — F32.A CHRONIC DEPRESSION: Chronic | ICD-10-CM

## 2020-05-20 RX ORDER — TEMAZEPAM 30 MG/1
CAPSULE ORAL
Qty: 30 CAPSULE | Refills: 4 | OUTPATIENT
Start: 2020-05-20

## 2020-05-21 ENCOUNTER — TELEMEDICINE (OUTPATIENT)
Dept: FAMILY MEDICINE CLINIC | Facility: CLINIC | Age: 75
End: 2020-05-21

## 2020-05-21 VITALS — DIASTOLIC BLOOD PRESSURE: 78 MMHG | BODY MASS INDEX: 24.21 KG/M2 | SYSTOLIC BLOOD PRESSURE: 134 MMHG | WEIGHT: 150 LBS

## 2020-05-21 DIAGNOSIS — Z00.00 MEDICARE ANNUAL WELLNESS VISIT, INITIAL: ICD-10-CM

## 2020-05-21 DIAGNOSIS — R92.8 ABNORMAL MAMMOGRAM: ICD-10-CM

## 2020-05-21 DIAGNOSIS — F32.A CHRONIC DEPRESSION: Chronic | ICD-10-CM

## 2020-05-21 DIAGNOSIS — I10 ESSENTIAL HYPERTENSION: Chronic | ICD-10-CM

## 2020-05-21 DIAGNOSIS — Z12.11 SCREEN FOR COLON CANCER: ICD-10-CM

## 2020-05-21 DIAGNOSIS — F41.1 GENERALIZED ANXIETY DISORDER: Chronic | ICD-10-CM

## 2020-05-21 DIAGNOSIS — E78.49 OTHER HYPERLIPIDEMIA: Primary | Chronic | ICD-10-CM

## 2020-05-21 PROCEDURE — 96160 PT-FOCUSED HLTH RISK ASSMT: CPT | Performed by: FAMILY MEDICINE

## 2020-05-21 PROCEDURE — 99214 OFFICE O/P EST MOD 30 MIN: CPT | Performed by: FAMILY MEDICINE

## 2020-05-21 PROCEDURE — G0438 PPPS, INITIAL VISIT: HCPCS | Performed by: FAMILY MEDICINE

## 2020-05-21 RX ORDER — LISINOPRIL 10 MG/1
10 TABLET ORAL DAILY
Qty: 90 TABLET | Refills: 1 | Status: SHIPPED | OUTPATIENT
Start: 2020-05-21 | End: 2020-11-13

## 2020-05-21 RX ORDER — ESCITALOPRAM OXALATE 10 MG/1
10 TABLET ORAL DAILY
Qty: 90 TABLET | Refills: 1 | Status: SHIPPED | OUTPATIENT
Start: 2020-05-21 | End: 2020-11-13

## 2020-05-21 RX ORDER — BUPROPION HYDROCHLORIDE 300 MG/1
300 TABLET ORAL DAILY
Qty: 90 TABLET | Refills: 1 | Status: SHIPPED | OUTPATIENT
Start: 2020-05-21 | End: 2020-11-13

## 2020-05-21 RX ORDER — TEMAZEPAM 30 MG/1
30 CAPSULE ORAL NIGHTLY PRN
Qty: 30 CAPSULE | Refills: 5 | Status: SHIPPED | OUTPATIENT
Start: 2020-05-21 | End: 2020-11-18 | Stop reason: SDUPTHER

## 2020-05-21 NOTE — PROGRESS NOTES
The ABCs of the Annual Wellness Visit  Initial Medicare Wellness Visit    Chief Complaint   Patient presents with   • Hypertension   • Hyperlipidemia       Subjective   History of Present Illness:  Brunilda Horner is a 75 y.o. female who presents for an Initial Medicare Wellness Visit.    HEALTH RISK ASSESSMENT    Recent Hospitalizations:  Recently treated at the following:  [facility]    Current Medical Providers:  Patient Care Team:  Jesus Krause MD as PCP - General (Family Medicine)  Dena Carter APRN as Nurse Practitioner (Orthopedic Surgery)  Shady Canales MD as Surgeon (Orthopedic Surgery)    Smoking Status:  Social History     Tobacco Use   Smoking Status Former Smoker   • Packs/day: 1.00   • Years: 10.00   • Pack years: 10.00   • Last attempt to quit: 1976   • Years since quittin.4   Smokeless Tobacco Never Used       Alcohol Consumption:  Social History     Substance and Sexual Activity   Alcohol Use Yes   • Alcohol/week: 2.0 standard drinks   • Types: 2 Glasses of wine per week       Depression Screen:   PHQ-2/PHQ-9 Depression Screening 2018   Little interest or pleasure in doing things 0   Feeling down, depressed, or hopeless 0   Total Score 0       Fall Risk Screen:  STEADI Fall Risk Assessment has not been completed.    Health Habits and Functional and Cognitive Screening:  No flowsheet data found.      Does the patient have evidence of cognitive impairment? No    Asprin use counseling:Does not need ASA (and currently is not on it)    Age-appropriate Screening Schedule:  Refer to the list below for future screening recommendations based on patient's age, sex and/or medical conditions. Orders for these recommended tests are listed in the plan section. The patient has been provided with a written plan.    Health Maintenance   Topic Date Due   • COLONOSCOPY  2020   • LIPID PANEL  2020   • INFLUENZA VACCINE  2020   • MAMMOGRAM  2021   •  TDAP/TD VACCINES (2 - Td) 09/06/2022   • DXA SCAN  Discontinued   • ZOSTER VACCINE  Discontinued          The following portions of the patient's history were reviewed and updated as appropriate: allergies, current medications, past family history, past medical history, past social history, past surgical history and problem list.    Outpatient Medications Prior to Visit   Medication Sig Dispense Refill   • atorvastatin (LIPITOR) 10 MG tablet Take 1 tablet by mouth Every Night. 90 tablet 3   • Calcium Carbonate-Vitamin D (CALTRATE 600+D PO) Take  by mouth 2 (Two) Times a Day.     • cholecalciferol (VITAMIN D3) 1000 UNITS tablet Take 1,000 Units by mouth daily.     • cloNIDine (CATAPRES) 0.1 MG tablet TAKE ONE TABLET BY MOUTH THREE TIMES A DAY AS NEEDED FOR ELEVATED BLOOD PRESSURE 30 tablet 0   • Multiple Vitamins-Minerals (ICAPS AREDS 2 PO) Take  by mouth 2 (Two) Times a Day.     • Multiple Vitamins-Minerals (MULTIVITAMIN ADULT) tablet Take 1 tablet by mouth Daily.     • Omega-3 Fatty Acids (OMEGA-3 PO) Take  by mouth Daily.     • tiZANidine (ZANAFLEX) 4 MG tablet Take 1 tablet by mouth At Night As Needed for Muscle Spasms. 30 tablet 5   • vitamin E 400 UNIT capsule Take 400 Units by mouth daily.     • buPROPion XL (WELLBUTRIN XL) 300 MG 24 hr tablet Take 1 tablet by mouth Daily. 90 tablet 1   • escitalopram (LEXAPRO) 10 MG tablet Take 1 tablet by mouth Daily. 90 tablet 1   • lisinopril (PRINIVIL,ZESTRIL) 10 MG tablet Take 1 tablet by mouth Daily. 90 tablet 1   • temazepam (RESTORIL) 30 MG capsule Take 1 capsule by mouth At Night As Needed for Sleep. 30 capsule 5     No facility-administered medications prior to visit.        Patient Active Problem List   Diagnosis   • Generalized anxiety disorder   • Chronic depression   • Chronic pain of right hip   • Bilateral primary osteoarthritis of hip   • Disorder of SI (sacroiliac) joint   • Other hyperlipidemia   • Essential hypertension   • Presence of right artificial hip  joint       Advanced Care Planning:  ACP discussion was held with the patient during this visit. Patient has an advance directive in EMR which is still valid.     Review of Systems    Compared to one year ago, the patient feels her physical health is better.  Compared to one year ago, the patient feels her mental health is better.    Reviewed chart for potential of high risk medication in the elderly: yes  Reviewed chart for potential of harmful drug interactions in the elderly:yes    Objective         Vitals:    05/21/20 1305   BP: 134/78   Weight: 68 kg (150 lb)       Body mass index is 24.21 kg/m².  Discussed the patient's BMI with her. The BMI is in the acceptable range.    Physical Exam          Assessment/Plan   Medicare Risks and Personalized Health Plan  CMS Preventative Services Quick Reference  Advance Directive Discussion  Breast Cancer/Mammogram Screening  Colon Cancer Screening  Depression/Dysphoria  Infection prevention    The above risks/problems have been discussed with the patient.  Pertinent information has been shared with the patient in the After Visit Summary.  Follow up plans and orders are seen below in the Assessment/Plan Section.    Diagnoses and all orders for this visit:    1. Other hyperlipidemia (Primary)  -     Lipid Panel With LDL / HDL Ratio; Future    2. Essential hypertension  -     Magnesium; Future  -     Comprehensive Metabolic Panel; Future  -     lisinopril (PRINIVIL,ZESTRIL) 10 MG tablet; Take 1 tablet by mouth Daily.  Dispense: 90 tablet; Refill: 1    3. Generalized anxiety disorder    4. Chronic depression  -     temazepam (RESTORIL) 30 MG capsule; Take 1 capsule by mouth At Night As Needed for Sleep.  Dispense: 30 capsule; Refill: 5  -     escitalopram (LEXAPRO) 10 MG tablet; Take 1 tablet by mouth Daily.  Dispense: 90 tablet; Refill: 1  -     buPROPion XL (WELLBUTRIN XL) 300 MG 24 hr tablet; Take 1 tablet by mouth Daily.  Dispense: 90 tablet; Refill: 1    5. Medicare  annual wellness visit, initial    6. Abnormal mammogram  -     US Breast Right Complete; Future    7. Screen for colon cancer  -     Cologuard - Stool, Per Rectum; Future      Follow Up:  No follow-ups on file.     An After Visit Summary and PPPS were given to the patient.     See previous note

## 2020-05-21 NOTE — PROGRESS NOTES
Subjective   Brunilda Horner is a 75 y.o. female. You have chosen to receive care through a telehealth visit.  Do you consent to use a video/audio connection for your medical care today? Yes      History of Present Illness reevaluation hypertension hyperlipidemia status post hip replacement generalized anxiety disorder chronic depressive disorder abnormal mammogram.  In interim states feels well maintaining weight and blood pressure taking all medication.  Continues on medicine for sleep at night as well as her other psychotropic medicines cholesterol etc.  Time for lab work.  Also time for repeat ultrasound right breast given last findings.  Is also due for colon exam and we can do Cologuard.  Is avoiding exposure for due to the pandemic.  Overall feels well.    The following portions of the patient's history were reviewed and updated as appropriate: allergies, current medications, past family history, past medical history, past social history, past surgical history and problem list.    Review of Systems   Constitutional: Negative for activity change, appetite change, fatigue and unexpected weight change.   HENT: Negative for trouble swallowing and voice change.    Eyes: Negative for redness and visual disturbance.   Respiratory: Negative for cough and wheezing.    Cardiovascular: Negative for chest pain and palpitations.   Gastrointestinal: Negative for abdominal pain, constipation, diarrhea, nausea and vomiting.   Genitourinary: Negative for urgency.   Musculoskeletal: Negative for joint swelling.   Neurological: Negative for syncope and headaches.   Hematological: Negative for adenopathy.   Psychiatric/Behavioral: Negative.  Negative for sleep disturbance.       Objective   Physical Exam   Constitutional: She appears well-developed.   HENT:   Head: Normocephalic.   Eyes: Pupils are equal, round, and reactive to light.   Neck: Normal range of motion.   Cardiovascular: Normal rate.   Pulmonary/Chest: Effort  normal.   Neurological: She is alert.   Skin: Skin is warm.   Psychiatric: She has a normal mood and affect. Her behavior is normal. Judgment and thought content normal.       Assessment/Plan   Brunilda was seen today for hypertension and hyperlipidemia.    Diagnoses and all orders for this visit:    Other hyperlipidemia  -     Lipid Panel With LDL / HDL Ratio; Future    Essential hypertension  -     Magnesium; Future  -     Comprehensive Metabolic Panel; Future  -     lisinopril (PRINIVIL,ZESTRIL) 10 MG tablet; Take 1 tablet by mouth Daily.    Generalized anxiety disorder    Chronic depression  -     temazepam (RESTORIL) 30 MG capsule; Take 1 capsule by mouth At Night As Needed for Sleep.  -     escitalopram (LEXAPRO) 10 MG tablet; Take 1 tablet by mouth Daily.  -     buPROPion XL (WELLBUTRIN XL) 300 MG 24 hr tablet; Take 1 tablet by mouth Daily.    Medicare annual wellness visit, initial    Abnormal mammogram  -     US Breast Right Complete; Future    Screen for colon cancer  -     Cologuard - Stool, Per Rectum; Future      Counseled on need for continued lifestyle changes following fall subspecialist continuing all medicines.  Lab work ordered.  Recheck 6 months.  This was an audio and video enabled telemedicine encounter. The time that was spent in reviewing the patient's chart and addressing their symptoms, diagnosis and treatment was 16 mins.

## 2020-06-02 DIAGNOSIS — M16.0 BILATERAL PRIMARY OSTEOARTHRITIS OF HIP: Chronic | ICD-10-CM

## 2020-06-02 RX ORDER — TIZANIDINE 4 MG/1
TABLET ORAL
Qty: 30 TABLET | Refills: 4 | Status: SHIPPED | OUTPATIENT
Start: 2020-06-02 | End: 2020-10-30 | Stop reason: SDUPTHER

## 2020-06-04 RX ORDER — CLONIDINE HYDROCHLORIDE 0.1 MG/1
TABLET ORAL
Qty: 30 TABLET | Refills: 0 | Status: SHIPPED | OUTPATIENT
Start: 2020-06-04 | End: 2020-07-06

## 2020-06-18 ENCOUNTER — TELEPHONE (OUTPATIENT)
Dept: FAMILY MEDICINE CLINIC | Facility: CLINIC | Age: 75
End: 2020-06-18

## 2020-06-18 NOTE — TELEPHONE ENCOUNTER
PATIENT STATES THAT SHE WAS TOLD SHE CANT HAVE SCRIPT FILLED FOR TEMAZEPAM UNLESS DR. DRAPER CALLS AND OKAYS THE REFILL.

## 2020-07-06 RX ORDER — CLONIDINE HYDROCHLORIDE 0.1 MG/1
TABLET ORAL
Qty: 30 TABLET | Refills: 0 | Status: SHIPPED | OUTPATIENT
Start: 2020-07-06 | End: 2020-08-03

## 2020-08-03 RX ORDER — CLONIDINE HYDROCHLORIDE 0.1 MG/1
TABLET ORAL
Qty: 30 TABLET | Refills: 0 | Status: SHIPPED | OUTPATIENT
Start: 2020-08-03 | End: 2020-09-03

## 2020-08-17 RX ORDER — ATORVASTATIN CALCIUM 10 MG/1
TABLET, FILM COATED ORAL
Qty: 90 TABLET | Refills: 2 | Status: SHIPPED | OUTPATIENT
Start: 2020-08-17 | End: 2020-11-18 | Stop reason: SDUPTHER

## 2020-09-03 RX ORDER — CLONIDINE HYDROCHLORIDE 0.1 MG/1
TABLET ORAL
Qty: 30 TABLET | Refills: 0 | Status: SHIPPED | OUTPATIENT
Start: 2020-09-03 | End: 2020-09-28

## 2020-09-28 RX ORDER — CLONIDINE HYDROCHLORIDE 0.1 MG/1
TABLET ORAL
Qty: 30 TABLET | Refills: 0 | Status: SHIPPED | OUTPATIENT
Start: 2020-09-28 | End: 2020-10-20

## 2020-10-20 ENCOUNTER — TELEPHONE (OUTPATIENT)
Dept: FAMILY MEDICINE CLINIC | Facility: CLINIC | Age: 75
End: 2020-10-20

## 2020-10-20 RX ORDER — CLONIDINE HYDROCHLORIDE 0.1 MG/1
TABLET ORAL
Qty: 30 TABLET | Refills: 0 | Status: SHIPPED | OUTPATIENT
Start: 2020-10-20 | End: 2020-11-06

## 2020-10-21 NOTE — TELEPHONE ENCOUNTER
Patient called and stated that she received a text that it was ready but she says the pharmacy has not even seen it in their system. Please advice.    [phone]

## 2020-10-30 DIAGNOSIS — M16.0 BILATERAL PRIMARY OSTEOARTHRITIS OF HIP: Chronic | ICD-10-CM

## 2020-11-02 RX ORDER — TIZANIDINE 4 MG/1
4 TABLET ORAL NIGHTLY PRN
Qty: 30 TABLET | Refills: 0 | Status: SHIPPED | OUTPATIENT
Start: 2020-11-02 | End: 2020-11-18 | Stop reason: SDUPTHER

## 2020-11-06 ENCOUNTER — LAB (OUTPATIENT)
Dept: LAB | Facility: HOSPITAL | Age: 75
End: 2020-11-06

## 2020-11-06 DIAGNOSIS — I10 ESSENTIAL HYPERTENSION: Chronic | ICD-10-CM

## 2020-11-06 DIAGNOSIS — E78.49 OTHER HYPERLIPIDEMIA: Chronic | ICD-10-CM

## 2020-11-06 LAB
ALBUMIN SERPL-MCNC: 5 G/DL (ref 3.5–5.2)
ALBUMIN/GLOB SERPL: 1.9 G/DL
ALP SERPL-CCNC: 195 U/L (ref 39–117)
ALT SERPL W P-5'-P-CCNC: 47 U/L (ref 1–33)
ANION GAP SERPL CALCULATED.3IONS-SCNC: 14 MMOL/L (ref 5–15)
AST SERPL-CCNC: 52 U/L (ref 1–32)
BILIRUB SERPL-MCNC: 0.4 MG/DL (ref 0–1.2)
BUN SERPL-MCNC: 15 MG/DL (ref 8–23)
BUN/CREAT SERPL: 14.4 (ref 7–25)
CALCIUM SPEC-SCNC: 9.8 MG/DL (ref 8.6–10.5)
CHLORIDE SERPL-SCNC: 99 MMOL/L (ref 98–107)
CHOLEST SERPL-MCNC: 248 MG/DL (ref 0–200)
CO2 SERPL-SCNC: 26 MMOL/L (ref 22–29)
CREAT SERPL-MCNC: 1.04 MG/DL (ref 0.57–1)
GFR SERPL CREATININE-BSD FRML MDRD: 52 ML/MIN/1.73
GLOBULIN UR ELPH-MCNC: 2.6 GM/DL
GLUCOSE SERPL-MCNC: 77 MG/DL (ref 65–99)
HDLC SERPL-MCNC: 150 MG/DL (ref 40–60)
LDLC SERPL CALC-MCNC: 88 MG/DL (ref 0–100)
LDLC/HDLC SERPL: 0.57 {RATIO}
MAGNESIUM SERPL-MCNC: 1.9 MG/DL (ref 1.6–2.4)
POTASSIUM SERPL-SCNC: 4.8 MMOL/L (ref 3.5–5.2)
PROT SERPL-MCNC: 7.6 G/DL (ref 6–8.5)
SODIUM SERPL-SCNC: 139 MMOL/L (ref 136–145)
TRIGL SERPL-MCNC: 64 MG/DL (ref 0–150)
VLDLC SERPL-MCNC: 10 MG/DL (ref 5–40)

## 2020-11-06 PROCEDURE — 80053 COMPREHEN METABOLIC PANEL: CPT

## 2020-11-06 PROCEDURE — 80061 LIPID PANEL: CPT

## 2020-11-06 PROCEDURE — 83735 ASSAY OF MAGNESIUM: CPT

## 2020-11-06 PROCEDURE — 36415 COLL VENOUS BLD VENIPUNCTURE: CPT

## 2020-11-06 RX ORDER — CLONIDINE HYDROCHLORIDE 0.1 MG/1
TABLET ORAL
Qty: 30 TABLET | Refills: 0 | Status: SHIPPED | OUTPATIENT
Start: 2020-11-06 | End: 2020-11-18 | Stop reason: SDUPTHER

## 2020-11-13 DIAGNOSIS — F32.A CHRONIC DEPRESSION: Chronic | ICD-10-CM

## 2020-11-13 DIAGNOSIS — I10 ESSENTIAL HYPERTENSION: Chronic | ICD-10-CM

## 2020-11-13 RX ORDER — BUPROPION HYDROCHLORIDE 300 MG/1
TABLET ORAL
Qty: 90 TABLET | Refills: 1 | Status: SHIPPED | OUTPATIENT
Start: 2020-11-13 | End: 2021-05-12

## 2020-11-13 RX ORDER — ESCITALOPRAM OXALATE 10 MG/1
TABLET ORAL
Qty: 90 TABLET | Refills: 1 | Status: SHIPPED | OUTPATIENT
Start: 2020-11-13 | End: 2021-05-12

## 2020-11-13 RX ORDER — LISINOPRIL 10 MG/1
TABLET ORAL
Qty: 90 TABLET | Refills: 1 | Status: SHIPPED | OUTPATIENT
Start: 2020-11-13 | End: 2021-05-12

## 2020-11-16 DIAGNOSIS — Z12.11 SCREEN FOR COLON CANCER: ICD-10-CM

## 2020-11-18 ENCOUNTER — OFFICE VISIT (OUTPATIENT)
Dept: FAMILY MEDICINE CLINIC | Facility: CLINIC | Age: 75
End: 2020-11-18

## 2020-11-18 VITALS
SYSTOLIC BLOOD PRESSURE: 142 MMHG | WEIGHT: 166 LBS | HEIGHT: 66 IN | BODY MASS INDEX: 26.68 KG/M2 | DIASTOLIC BLOOD PRESSURE: 82 MMHG

## 2020-11-18 DIAGNOSIS — M16.0 BILATERAL PRIMARY OSTEOARTHRITIS OF HIP: Chronic | ICD-10-CM

## 2020-11-18 DIAGNOSIS — F41.1 GENERALIZED ANXIETY DISORDER: Chronic | ICD-10-CM

## 2020-11-18 DIAGNOSIS — I10 ESSENTIAL HYPERTENSION: Primary | Chronic | ICD-10-CM

## 2020-11-18 DIAGNOSIS — E78.49 OTHER HYPERLIPIDEMIA: Chronic | ICD-10-CM

## 2020-11-18 DIAGNOSIS — Z12.31 SCREENING MAMMOGRAM FOR HIGH-RISK PATIENT: ICD-10-CM

## 2020-11-18 DIAGNOSIS — Z23 NEED FOR IMMUNIZATION AGAINST INFLUENZA: ICD-10-CM

## 2020-11-18 DIAGNOSIS — F32.A CHRONIC DEPRESSION: Chronic | ICD-10-CM

## 2020-11-18 PROBLEM — M25.551 CHRONIC PAIN OF RIGHT HIP: Chronic | Status: RESOLVED | Noted: 2018-02-16 | Resolved: 2020-11-18

## 2020-11-18 PROBLEM — G89.29 CHRONIC PAIN OF RIGHT HIP: Chronic | Status: RESOLVED | Noted: 2018-02-16 | Resolved: 2020-11-18

## 2020-11-18 PROCEDURE — 99214 OFFICE O/P EST MOD 30 MIN: CPT | Performed by: FAMILY MEDICINE

## 2020-11-18 PROCEDURE — G0008 ADMIN INFLUENZA VIRUS VAC: HCPCS | Performed by: FAMILY MEDICINE

## 2020-11-18 PROCEDURE — 90653 IIV ADJUVANT VACCINE IM: CPT | Performed by: FAMILY MEDICINE

## 2020-11-18 RX ORDER — ATORVASTATIN CALCIUM 10 MG/1
10 TABLET, FILM COATED ORAL NIGHTLY
Qty: 90 TABLET | Refills: 2 | Status: SHIPPED | OUTPATIENT
Start: 2020-11-18 | End: 2021-05-20 | Stop reason: SDUPTHER

## 2020-11-18 RX ORDER — TIZANIDINE 4 MG/1
4 TABLET ORAL NIGHTLY PRN
Qty: 90 TABLET | Refills: 3 | Status: SHIPPED | OUTPATIENT
Start: 2020-11-18 | End: 2021-11-23

## 2020-11-18 RX ORDER — CLONIDINE HYDROCHLORIDE 0.1 MG/1
TABLET ORAL
Qty: 90 TABLET | Refills: 1 | Status: SHIPPED | OUTPATIENT
Start: 2020-11-18 | End: 2021-03-18

## 2020-11-18 RX ORDER — TEMAZEPAM 30 MG/1
30 CAPSULE ORAL NIGHTLY PRN
Qty: 30 CAPSULE | Refills: 5 | Status: SHIPPED | OUTPATIENT
Start: 2020-11-18 | End: 2021-05-20

## 2020-11-18 NOTE — PROGRESS NOTES
Subjective   Brunilda Horner is a 75 y.o. female.  Evaluation hypertension hyperlipidemia DJD mild chronic anxiety insomnia etc.  In interim after hip surgery moving much better being more active feels much better.  Lab work acceptable.  Is due for mammogram next month.  Has had a colon screen.  Up-to-date on all other feels well otherwise.    History of Present Illness   HPI    The following portions of the patient's history were reviewed and updated as appropriate: allergies, current medications, past family history, past medical history, past social history, past surgical history and problem list.    Review of Systems  Review of Systems   Constitutional: Negative for activity change, appetite change, fatigue and unexpected weight change.   HENT: Negative for trouble swallowing and voice change.    Eyes: Negative for redness and visual disturbance.   Respiratory: Negative for cough and wheezing.    Cardiovascular: Negative for chest pain and palpitations.   Gastrointestinal: Negative for abdominal pain, constipation, diarrhea, nausea and vomiting.   Genitourinary: Negative for urgency.   Musculoskeletal: Negative for joint swelling.   Neurological: Negative for syncope and headaches.   Hematological: Negative for adenopathy.   Psychiatric/Behavioral: Positive for dysphoric mood (Stable on medication) and sleep disturbance (Stable).       Objective   Physical Exam  Physical Exam  Constitutional:       Appearance: She is well-developed.   HENT:      Head: Normocephalic.   Eyes:      Pupils: Pupils are equal, round, and reactive to light.   Neck:      Musculoskeletal: Normal range of motion.      Thyroid: No thyromegaly.   Cardiovascular:      Rate and Rhythm: Normal rate and regular rhythm.      Heart sounds: Normal heart sounds. No murmur. No friction rub. No gallop.    Pulmonary:      Breath sounds: Normal breath sounds.   Abdominal:      General: There is no distension.      Palpations: Abdomen is soft. There is  "no mass.      Tenderness: There is no abdominal tenderness.   Musculoskeletal: Normal range of motion.      Comments: No peripheral edema get up and go 3 to 5 seconds gait normal   Skin:     General: Skin is warm and dry.   Neurological:      Mental Status: She is alert and oriented to person, place, and time.      Deep Tendon Reflexes: Reflexes are normal and symmetric.   Psychiatric:         Attention and Perception: Attention normal.         Mood and Affect: Mood normal.         Speech: Speech normal.         Behavior: Behavior normal.       Results for orders placed or performed in visit on 11/06/20   Lipid Panel With LDL / HDL Ratio    Specimen: Blood   Result Value Ref Range    Total Cholesterol 248 (H) 0 - 200 mg/dL    Triglycerides 64 0 - 150 mg/dL    HDL Cholesterol 150 (H) 40 - 60 mg/dL    LDL Cholesterol  88 0 - 100 mg/dL    VLDL Cholesterol 10 5 - 40 mg/dL    LDL/HDL Ratio 0.57    Magnesium    Specimen: Blood   Result Value Ref Range    Magnesium 1.9 1.6 - 2.4 mg/dL   Comprehensive Metabolic Panel    Specimen: Blood   Result Value Ref Range    Glucose 77 65 - 99 mg/dL    BUN 15 8 - 23 mg/dL    Creatinine 1.04 (H) 0.57 - 1.00 mg/dL    Sodium 139 136 - 145 mmol/L    Potassium 4.8 3.5 - 5.2 mmol/L    Chloride 99 98 - 107 mmol/L    CO2 26.0 22.0 - 29.0 mmol/L    Calcium 9.8 8.6 - 10.5 mg/dL    Total Protein 7.6 6.0 - 8.5 g/dL    Albumin 5.00 3.50 - 5.20 g/dL    ALT (SGPT) 47 (H) 1 - 33 U/L    AST (SGOT) 52 (H) 1 - 32 U/L    Alkaline Phosphatase 195 (H) 39 - 117 U/L    Total Bilirubin 0.4 0.0 - 1.2 mg/dL    eGFR Non African Amer 52 (L) >60 mL/min/1.73    Globulin 2.6 gm/dL    A/G Ratio 1.9 g/dL    BUN/Creatinine Ratio 14.4 7.0 - 25.0    Anion Gap 14.0 5.0 - 15.0 mmol/L         Visit Vitals  /82   Ht 167.6 cm (66\")   Wt 75.3 kg (166 lb)   LMP  (LMP Unknown)   BMI 26.79 kg/m²     Body mass index is 26.79 kg/m².      Assessment/Plan   Diagnoses and all orders for this visit:    1. Essential hypertension " (Primary)    2. Other hyperlipidemia    3. Chronic depression  -     temazepam (RESTORIL) 30 MG capsule; Take 1 capsule by mouth At Night As Needed for Sleep.  Dispense: 30 capsule; Refill: 5    4. Generalized anxiety disorder    5. Screening mammogram for high-risk patient  -     Mammo Screening Digital Tomosynthesis Bilateral With CAD; Future    6. Need for immunization against influenza  -     Fluad Tri 65yr+    7. Bilateral primary osteoarthritis of hip  -     tiZANidine (ZANAFLEX) 4 MG tablet; Take 1 tablet by mouth At Night As Needed for Muscle Spasms.  Dispense: 90 tablet; Refill: 3    Other orders  -     cloNIDine (CATAPRES) 0.1 MG tablet; TAKE ONE TABLET BY MOUTH THREE TIMES A DAY AS NEEDED FOR ELEVATED BLOOD PRESSURE  Dispense: 90 tablet; Refill: 1  -     atorvastatin (LIPITOR) 10 MG tablet; Take 1 tablet by mouth Every Night.  Dispense: 90 tablet; Refill: 2    Counseled mainly on lifestyle measures infection prevention hydration fall prevention as before.  Medicines as outlined.  Recheck again in 6 months be time for subsequent Medicare

## 2020-11-19 DIAGNOSIS — F32.A CHRONIC DEPRESSION: Chronic | ICD-10-CM

## 2020-11-19 RX ORDER — TEMAZEPAM 30 MG/1
CAPSULE ORAL
Qty: 30 CAPSULE | Refills: 4 | OUTPATIENT
Start: 2020-11-19

## 2020-12-10 ENCOUNTER — OFFICE VISIT (OUTPATIENT)
Dept: FAMILY MEDICINE CLINIC | Facility: CLINIC | Age: 75
End: 2020-12-10

## 2020-12-10 ENCOUNTER — TELEPHONE (OUTPATIENT)
Dept: FAMILY MEDICINE CLINIC | Facility: CLINIC | Age: 75
End: 2020-12-10

## 2020-12-10 VITALS
SYSTOLIC BLOOD PRESSURE: 122 MMHG | BODY MASS INDEX: 26.9 KG/M2 | HEIGHT: 66 IN | WEIGHT: 167.4 LBS | DIASTOLIC BLOOD PRESSURE: 80 MMHG

## 2020-12-10 DIAGNOSIS — L24.4 IRRITANT CONTACT DERMATITIS DUE TO DRUG IN CONTACT WITH SKIN: ICD-10-CM

## 2020-12-10 DIAGNOSIS — R21 RASH: Primary | ICD-10-CM

## 2020-12-10 PROCEDURE — 99213 OFFICE O/P EST LOW 20 MIN: CPT | Performed by: FAMILY MEDICINE

## 2020-12-10 RX ORDER — TRIAMCINOLONE ACETONIDE 5 MG/G
CREAM TOPICAL
Qty: 30 G | Refills: 2 | Status: SHIPPED | OUTPATIENT
Start: 2020-12-10 | End: 2020-12-16 | Stop reason: SDUPTHER

## 2020-12-10 RX ORDER — PREDNISONE 20 MG/1
TABLET ORAL
Qty: 10 TABLET | Refills: 0 | Status: SHIPPED | OUTPATIENT
Start: 2020-12-10 | End: 2020-12-20

## 2020-12-10 NOTE — PROGRESS NOTES
Subjective   Brunilda Horner is a 75 y.o. female.  Request evaluation of rash.  Start off with 3 small red areas in the left wrist start using lotion and Benadryl cream because continue to itch start start you can use up on the forearms because started itch more now is developed diffuse multiple macules and papules in both arms and a larger patch in the left antecubital fossa.  No known other exposures no contact beyond same.    History of Present Illness   Rash  Pertinent negatives include no cough, diarrhea, fatigue or vomiting.       The following portions of the patient's history were reviewed and updated as appropriate: allergies, current medications, past family history, past medical history, past social history, past surgical history and problem list.    Review of Systems  Review of Systems   Constitutional: Negative for activity change, appetite change, fatigue and unexpected weight change.   HENT: Negative for trouble swallowing and voice change.    Eyes: Negative for redness and visual disturbance.   Respiratory: Negative for cough and wheezing.    Cardiovascular: Negative for chest pain and palpitations.   Gastrointestinal: Negative for abdominal pain, constipation, diarrhea, nausea and vomiting.   Genitourinary: Negative for urgency.   Musculoskeletal: Negative for joint swelling.   Skin: Positive for rash.   Neurological: Negative for syncope and headaches.   Hematological: Negative for adenopathy.   Psychiatric/Behavioral: Negative for sleep disturbance.       Objective   Physical Exam  Physical Exam  Constitutional:       Appearance: She is well-developed.   HENT:      Head: Normocephalic.   Eyes:      Pupils: Pupils are equal, round, and reactive to light.   Neck:      Musculoskeletal: Normal range of motion.      Thyroid: No thyromegaly.   Cardiovascular:      Rate and Rhythm: Normal rate.      Heart sounds: No murmur. No friction rub. No gallop.    Pulmonary:      Effort: Pulmonary effort is  "normal.   Abdominal:      General: There is no distension.      Palpations: Abdomen is soft. There is no mass.      Tenderness: There is no abdominal tenderness.   Musculoskeletal: Normal range of motion.   Skin:     General: Skin is warm and dry.      Comments: Both arms and forearm shows multiple raised small red maculopapular areas consistent what looks like mild asteatotic eczema.  There is a left antecubital fossa lateral wound that looks like it is chronically irritated probably secondary chemically attended about 2-1/2 cm.  Consistent with probable Benadryl cream dermatitis.  No tracking areas are seen.   Neurological:      Mental Status: She is alert and oriented to person, place, and time.      Deep Tendon Reflexes: Reflexes are normal and symmetric.           Visit Vitals  /80   Ht 167.6 cm (66\")   Wt 75.9 kg (167 lb 6.4 oz)   LMP  (LMP Unknown)   BMI 27.02 kg/m²     Body mass index is 27.02 kg/m².      Assessment/Plan   Diagnoses and all orders for this visit:    1. Rash (Primary)  -     triamcinolone (KENALOG) 0.5 % cream; Use to all rash area tid x 10 days then prn  Dispense: 30 g; Refill: 2  -     predniSONE (DELTASONE) 20 MG tablet; 2qdx 5 for rash  Dispense: 10 tablet; Refill: 0    2. Irritant contact dermatitis due to drug in contact with skin  -     triamcinolone (KENALOG) 0.5 % cream; Use to all rash area tid x 10 days then prn  Dispense: 30 g; Refill: 2  -     predniSONE (DELTASONE) 20 MG tablet; 2qdx 5 for rash  Dispense: 10 tablet; Refill: 0    Also cool water mild soap eczematous skin care.  Stop over-the-counter treatment.  May use lotions.  Short-term medication ordered symptomatic relief discussed rechecks direct  "

## 2020-12-10 NOTE — TELEPHONE ENCOUNTER
PT CALLED REGARDING A RASH THAT HAS BEEN POPPING UP IN VARIOUS PLACES, PT DESCRIBES IT AS SMALL BUMPS THAT ITCH AND TURN INTO RED PATCHES    PT WOULD LIKE A CALL BACK -[phone]

## 2020-12-15 DIAGNOSIS — L24.4 IRRITANT CONTACT DERMATITIS DUE TO DRUG IN CONTACT WITH SKIN: ICD-10-CM

## 2020-12-15 DIAGNOSIS — R21 RASH: ICD-10-CM

## 2020-12-15 RX ORDER — TRIAMCINOLONE ACETONIDE 5 MG/G
CREAM TOPICAL
Qty: 30 G | Refills: 2 | Status: CANCELLED | OUTPATIENT
Start: 2020-12-15

## 2020-12-15 NOTE — TELEPHONE ENCOUNTER
Caller: Siri Brunilda Avendaño    Relationship: Self    Best call back number: [phone]    Medication needed:   Requested Prescriptions     Pending Prescriptions Disp Refills   • triamcinolone (KENALOG) 0.5 % cream 30 g 2     Sig: Use to all rash area tid x 10 days then prn       When do you need the refill by: 12/15/2020    What details did the patient provide when requesting the medication: PATIENT STATES PHARMACY WILL NOT ALLOW TO REFILL FOR 5 DAYS. RASH IS NOT HEALED ALL THE WAY. REQUESTING A CALL FROM PROVIDER ONCE CALLED IN.     Does the patient have less than a 3 day supply:  [x] Yes  [] No    What is the patient's preferred pharmacy: [facility] [place] [address] AT [facility] [address] - [phone]  - [phone] FX

## 2020-12-16 DIAGNOSIS — R21 RASH: ICD-10-CM

## 2020-12-16 DIAGNOSIS — L24.4 IRRITANT CONTACT DERMATITIS DUE TO DRUG IN CONTACT WITH SKIN: ICD-10-CM

## 2020-12-16 RX ORDER — TRIAMCINOLONE ACETONIDE 5 MG/G
CREAM TOPICAL
Qty: 60 G | Refills: 2 | Status: SHIPPED | OUTPATIENT
Start: 2020-12-16 | End: 2020-12-17 | Stop reason: SDUPTHER

## 2020-12-17 DIAGNOSIS — R21 RASH: ICD-10-CM

## 2020-12-17 DIAGNOSIS — L24.4 IRRITANT CONTACT DERMATITIS DUE TO DRUG IN CONTACT WITH SKIN: ICD-10-CM

## 2020-12-17 RX ORDER — TRIAMCINOLONE ACETONIDE 5 MG/G
CREAM TOPICAL
Qty: 60 G | Refills: 2 | Status: SHIPPED | OUTPATIENT
Start: 2020-12-17 | End: 2021-03-22

## 2021-02-25 ENCOUNTER — IMMUNIZATION (OUTPATIENT)
Dept: VACCINE CLINIC | Facility: HOSPITAL | Age: 76
End: 2021-02-25

## 2021-02-25 PROCEDURE — 91300 HC SARSCOV02 VAC 30MCG/0.3ML IM: CPT | Performed by: THORACIC SURGERY (CARDIOTHORACIC VASCULAR SURGERY)

## 2021-02-25 PROCEDURE — 0001A: CPT | Performed by: THORACIC SURGERY (CARDIOTHORACIC VASCULAR SURGERY)

## 2021-03-18 ENCOUNTER — IMMUNIZATION (OUTPATIENT)
Dept: VACCINE CLINIC | Facility: HOSPITAL | Age: 76
End: 2021-03-18

## 2021-03-18 PROCEDURE — 0002A: CPT | Performed by: NURSE PRACTITIONER

## 2021-03-18 PROCEDURE — 91300 HC SARSCOV02 VAC 30MCG/0.3ML IM: CPT | Performed by: NURSE PRACTITIONER

## 2021-03-18 RX ORDER — CLONIDINE HYDROCHLORIDE 0.1 MG/1
TABLET ORAL
Qty: 90 TABLET | Refills: 3 | Status: SHIPPED | OUTPATIENT
Start: 2021-03-18 | End: 2021-11-23

## 2021-05-12 DIAGNOSIS — I10 ESSENTIAL HYPERTENSION: Chronic | ICD-10-CM

## 2021-05-12 DIAGNOSIS — F32.A CHRONIC DEPRESSION: Chronic | ICD-10-CM

## 2021-05-12 RX ORDER — ESCITALOPRAM OXALATE 10 MG/1
TABLET ORAL
Qty: 90 TABLET | Refills: 0 | Status: SHIPPED | OUTPATIENT
Start: 2021-05-12 | End: 2021-05-20 | Stop reason: SDUPTHER

## 2021-05-12 RX ORDER — BUPROPION HYDROCHLORIDE 300 MG/1
TABLET ORAL
Qty: 90 TABLET | Refills: 0 | Status: SHIPPED | OUTPATIENT
Start: 2021-05-12 | End: 2021-05-20 | Stop reason: SDUPTHER

## 2021-05-12 RX ORDER — BUPROPION HYDROCHLORIDE 300 MG/1
TABLET ORAL
Qty: 90 TABLET | Refills: 0 | Status: SHIPPED | OUTPATIENT
Start: 2021-05-12 | End: 2021-05-20

## 2021-05-12 RX ORDER — LISINOPRIL 10 MG/1
TABLET ORAL
Qty: 90 TABLET | Refills: 0 | Status: SHIPPED | OUTPATIENT
Start: 2021-05-12 | End: 2021-05-20 | Stop reason: SDUPTHER

## 2021-05-20 ENCOUNTER — OFFICE VISIT (OUTPATIENT)
Dept: FAMILY MEDICINE CLINIC | Facility: CLINIC | Age: 76
End: 2021-05-20

## 2021-05-20 VITALS
SYSTOLIC BLOOD PRESSURE: 138 MMHG | HEIGHT: 66 IN | WEIGHT: 163.3 LBS | DIASTOLIC BLOOD PRESSURE: 85 MMHG | BODY MASS INDEX: 26.24 KG/M2

## 2021-05-20 DIAGNOSIS — F32.A CHRONIC DEPRESSION: Chronic | ICD-10-CM

## 2021-05-20 DIAGNOSIS — E78.49 OTHER HYPERLIPIDEMIA: Chronic | ICD-10-CM

## 2021-05-20 DIAGNOSIS — I10 ESSENTIAL HYPERTENSION: Primary | Chronic | ICD-10-CM

## 2021-05-20 DIAGNOSIS — F41.1 GENERALIZED ANXIETY DISORDER: Chronic | ICD-10-CM

## 2021-05-20 DIAGNOSIS — Z00.00 MEDICARE ANNUAL WELLNESS VISIT, SUBSEQUENT: ICD-10-CM

## 2021-05-20 PROCEDURE — 96160 PT-FOCUSED HLTH RISK ASSMT: CPT | Performed by: FAMILY MEDICINE

## 2021-05-20 PROCEDURE — 1159F MED LIST DOCD IN RCRD: CPT | Performed by: FAMILY MEDICINE

## 2021-05-20 PROCEDURE — G0439 PPPS, SUBSEQ VISIT: HCPCS | Performed by: FAMILY MEDICINE

## 2021-05-20 PROCEDURE — 1126F AMNT PAIN NOTED NONE PRSNT: CPT | Performed by: FAMILY MEDICINE

## 2021-05-20 PROCEDURE — 99214 OFFICE O/P EST MOD 30 MIN: CPT | Performed by: FAMILY MEDICINE

## 2021-05-20 RX ORDER — ESCITALOPRAM OXALATE 10 MG/1
10 TABLET ORAL DAILY
Qty: 90 TABLET | Refills: 1 | Status: SHIPPED | OUTPATIENT
Start: 2021-05-20 | End: 2021-11-18 | Stop reason: SDUPTHER

## 2021-05-20 RX ORDER — TEMAZEPAM 30 MG/1
CAPSULE ORAL
Qty: 30 CAPSULE | Refills: 4 | Status: SHIPPED | OUTPATIENT
Start: 2021-05-20 | End: 2021-10-18 | Stop reason: SDUPTHER

## 2021-05-20 RX ORDER — BUPROPION HYDROCHLORIDE 300 MG/1
300 TABLET ORAL DAILY
Qty: 90 TABLET | Refills: 1 | Status: SHIPPED | OUTPATIENT
Start: 2021-05-20 | End: 2021-11-18 | Stop reason: SDUPTHER

## 2021-05-20 RX ORDER — LISINOPRIL 10 MG/1
10 TABLET ORAL DAILY
Qty: 90 TABLET | Refills: 3 | Status: SHIPPED | OUTPATIENT
Start: 2021-05-20 | End: 2022-05-19 | Stop reason: SDUPTHER

## 2021-05-20 RX ORDER — ATORVASTATIN CALCIUM 10 MG/1
10 TABLET, FILM COATED ORAL NIGHTLY
Qty: 90 TABLET | Refills: 2 | Status: SHIPPED | OUTPATIENT
Start: 2021-05-20 | End: 2022-02-11

## 2021-05-20 NOTE — PROGRESS NOTES
The ABCs of the Annual Wellness Visit  Subsequent Medicare Wellness Visit    Chief Complaint   Patient presents with   • Hypertension     6 month reval       Subjective   History of Present Illness:  Brunilda Horner is a 76 y.o. female who presents for a Subsequent Medicare Wellness Visit.    HEALTH RISK ASSESSMENT    Recent Hospitalizations:  No hospitalization(s) within the last year.    Current Medical Providers:  Patient Care Team:  Jesus Krause MD as PCP - General (Family Medicine)  Dena Carter APRN as Nurse Practitioner (Orthopedic Surgery)  Shady Canales MD as Surgeon (Orthopedic Surgery)    Smoking Status:  Social History     Tobacco Use   Smoking Status Former Smoker   • Packs/day: 1.00   • Years: 10.00   • Pack years: 10.00   • Quit date: 1976   • Years since quittin.4   Smokeless Tobacco Never Used       Alcohol Consumption:  Social History     Substance and Sexual Activity   Alcohol Use Yes   • Alcohol/week: 2.0 standard drinks   • Types: 2 Glasses of wine per week       Depression Screen:   PHQ-2/PHQ-9 Depression Screening 2020   Little interest or pleasure in doing things 0   Feeling down, depressed, or hopeless 1   Total Score 1       Fall Risk Screen:  STEADI Fall Risk Assessment has not been completed.    Health Habits and Functional and Cognitive Screening:  No flowsheet data found.      Does the patient have evidence of cognitive impairment? No    Asprin use counseling:Does not need ASA (and currently is not on it)    Age-appropriate Screening Schedule:  Refer to the list below for future screening recommendations based on patient's age, sex and/or medical conditions. Orders for these recommended tests are listed in the plan section. The patient has been provided with a written plan.    Health Maintenance   Topic Date Due   • INFLUENZA VACCINE  2021   • LIPID PANEL  2021   • TDAP/TD VACCINES (2 - Td or Tdap) 2022   • MAMMOGRAM  2023   • DXA  SCAN  Discontinued   • ZOSTER VACCINE  Discontinued          The following portions of the patient's history were reviewed and updated as appropriate: allergies, current medications, past family history, past medical history, past social history, past surgical history and problem list.    Outpatient Medications Prior to Visit   Medication Sig Dispense Refill   • Biotin 10 MG capsule Take  by mouth.     • Calcium Carbonate-Vitamin D (CALTRATE 600+D PO) Take  by mouth 2 (Two) Times a Day.     • cholecalciferol (VITAMIN D3) 1000 UNITS tablet Take 1,000 Units by mouth daily.     • cloNIDine (CATAPRES) 0.1 MG tablet TAKE ONE TABLET BY MOUTH THREE TIMES A DAY AS NEEDED FOR ELEVATED BLOOD PRESSURE 90 tablet 3   • Multiple Vitamins-Minerals (ICAPS AREDS 2 PO) Take  by mouth 2 (Two) Times a Day.     • Multiple Vitamins-Minerals (MULTIVITAMIN ADULT) tablet Take 1 tablet by mouth Daily.     • Omega-3 Fatty Acids (OMEGA-3 PO) Take  by mouth Daily.     • temazepam (RESTORIL) 30 MG capsule TAKE ONE CAPSULE BY MOUTH EVERY NIGHT AS NEEDED FOR SLEEP 30 capsule 4   • tiZANidine (ZANAFLEX) 4 MG tablet Take 1 tablet by mouth At Night As Needed for Muscle Spasms. 90 tablet 3   • vitamin E 400 UNIT capsule Take 400 Units by mouth daily.     • atorvastatin (LIPITOR) 10 MG tablet Take 1 tablet by mouth Every Night. 90 tablet 2   • buPROPion XL (WELLBUTRIN XL) 300 MG 24 hr tablet TAKE ONE TABLET BY MOUTH DAILY 90 tablet 0   • escitalopram (LEXAPRO) 10 MG tablet TAKE ONE TABLET BY MOUTH DAILY 90 tablet 0   • lisinopril (PRINIVIL,ZESTRIL) 10 MG tablet TAKE ONE TABLET BY MOUTH DAILY 90 tablet 0   • acetaminophen-codeine (TYLENOL #3) 300-30 MG per tablet Take 1 tablet by mouth Every 4 (Four) Hours As Needed for Moderate Pain . 15 tablet 0   • buPROPion XL (WELLBUTRIN XL) 300 MG 24 hr tablet TAKE ONE TABLET BY MOUTH DAILY 90 tablet 0     No facility-administered medications prior to visit.       Patient Active Problem List   Diagnosis   •  "Generalized anxiety disorder   • Chronic depression   • Bilateral primary osteoarthritis of hip   • Disorder of SI (sacroiliac) joint   • Other hyperlipidemia   • Essential hypertension   • Presence of right artificial hip joint       Advanced Care Planning:  ACP discussion was held with the patient during this visit. Patient has an advance directive (not in EMR), copy requested.    Review of Systems    Compared to one year ago, the patient feels her physical health is better.  Compared to one year ago, the patient feels her mental health is the same.    Reviewed chart for potential of high risk medication in the elderly: yes  Reviewed chart for potential of harmful drug interactions in the elderly:yes    Objective         Vitals:    05/20/21 1249   BP: 138/85   Weight: 74.1 kg (163 lb 4.8 oz)   Height: 167.6 cm (66\")   PainSc: 0-No pain       Body mass index is 26.36 kg/m².  Discussed the patient's BMI with her. The BMI is in the acceptable range.    Physical Exam          Assessment/Plan   Medicare Risks and Personalized Health Plan  CMS Preventative Services Quick Reference  Advance Directive Discussion  Fall Risk  Immunizations Discussed/Encouraged (specific immunizations; COVID19 )    The above risks/problems have been discussed with the patient.  Pertinent information has been shared with the patient in the After Visit Summary.  Follow up plans and orders are seen below in the Assessment/Plan Section.    Diagnoses and all orders for this visit:    1. Essential hypertension (Primary)  -     Magnesium; Future  -     Comprehensive Metabolic Panel; Future  -     lisinopril (PRINIVIL,ZESTRIL) 10 MG tablet; Take 1 tablet by mouth Daily.  Dispense: 90 tablet; Refill: 3    2. Other hyperlipidemia  -     Lipid Panel With LDL / HDL Ratio; Future  -     atorvastatin (LIPITOR) 10 MG tablet; Take 1 tablet by mouth Every Night.  Dispense: 90 tablet; Refill: 2    3. Chronic depression  -     escitalopram (LEXAPRO) 10 MG " tablet; Take 1 tablet by mouth Daily.  Dispense: 90 tablet; Refill: 1  -     buPROPion XL (WELLBUTRIN XL) 300 MG 24 hr tablet; Take 1 tablet by mouth Daily.  Dispense: 90 tablet; Refill: 1    4. Generalized anxiety disorder    5. Medicare annual wellness visit, subsequent      Follow Up:  No follow-ups on file.     An After Visit Summary and PPPS were given to the patient.

## 2021-05-20 NOTE — PROGRESS NOTES
Subjective   Brunilda Horner is a 76 y.o. female.  Reevaluation hypertension hyperlipidemia DJD of the hips mild depressive anxiety disorder.  In the interim is maintain weight and blood pressure try to be more active feels well.  Is been immunized.  Medicines have remained the same.  Last lab work acceptable.  Up-to-date on all other.    History of Present Illness   HPI    The following portions of the patient's history were reviewed and updated as appropriate: allergies, current medications, past family history, past medical history, past social history, past surgical history and problem list.    Review of Systems  Review of Systems   Constitutional: Negative for activity change, appetite change, fatigue and unexpected weight change.   HENT: Negative for trouble swallowing and voice change.    Eyes: Negative for redness and visual disturbance.   Respiratory: Negative for cough and wheezing.    Cardiovascular: Negative for chest pain and palpitations.   Gastrointestinal: Negative for abdominal pain, constipation, diarrhea, nausea and vomiting.   Genitourinary: Negative for urgency.   Musculoskeletal: Negative for joint swelling.   Neurological: Negative for syncope and headaches.   Hematological: Negative for adenopathy.   Psychiatric/Behavioral: Negative for sleep disturbance.       Objective   Physical Exam  Physical Exam  Constitutional:       Appearance: She is well-developed.   HENT:      Head: Normocephalic.   Eyes:      Pupils: Pupils are equal, round, and reactive to light.   Neck:      Thyroid: No thyromegaly.   Cardiovascular:      Rate and Rhythm: Normal rate and regular rhythm.      Heart sounds: Normal heart sounds. No murmur heard.   No friction rub. No gallop.    Pulmonary:      Breath sounds: Normal breath sounds.   Abdominal:      General: There is no distension.      Palpations: Abdomen is soft. There is no mass.      Tenderness: There is no abdominal tenderness.   Musculoskeletal:          "General: Normal range of motion.      Cervical back: Normal range of motion.      Comments: Get up and go 3 to 5 seconds gait normal 2+ pulses 2+ DTRs   Skin:     General: Skin is warm and dry.   Neurological:      Mental Status: She is alert and oriented to person, place, and time.      Deep Tendon Reflexes: Reflexes are normal and symmetric.   Psychiatric:         Attention and Perception: Attention normal.         Mood and Affect: Mood normal.         Speech: Speech normal.         Behavior: Behavior normal.         Thought Content: Thought content normal.         Cognition and Memory: Cognition normal.           Visit Vitals  /85   Ht 167.6 cm (66\")   Wt 74.1 kg (163 lb 4.8 oz)   LMP  (LMP Unknown)   BMI 26.36 kg/m²     Body mass index is 26.36 kg/m².    /85   Ht 167.6 cm (66\")   Wt 74.1 kg (163 lb 4.8 oz)   LMP  (LMP Unknown)   BMI 26.36 kg/m²     Assessment/Plan   Diagnoses and all orders for this visit:    1. Essential hypertension (Primary)  -     Magnesium; Future  -     Comprehensive Metabolic Panel; Future  -     lisinopril (PRINIVIL,ZESTRIL) 10 MG tablet; Take 1 tablet by mouth Daily.  Dispense: 90 tablet; Refill: 3    2. Other hyperlipidemia  -     Lipid Panel With LDL / HDL Ratio; Future  -     atorvastatin (LIPITOR) 10 MG tablet; Take 1 tablet by mouth Every Night.  Dispense: 90 tablet; Refill: 2    3. Chronic depression  -     escitalopram (LEXAPRO) 10 MG tablet; Take 1 tablet by mouth Daily.  Dispense: 90 tablet; Refill: 1  -     buPROPion XL (WELLBUTRIN XL) 300 MG 24 hr tablet; Take 1 tablet by mouth Daily.  Dispense: 90 tablet; Refill: 1    4. Generalized anxiety disorder    5. Medicare annual wellness visit, subsequent    Counseled mainly on lifestyle measures fall prevention infection prevention.  Continue medicines as outlined.  Recheck 6 months lab prior  "

## 2021-06-08 NOTE — TELEPHONE ENCOUNTER
Call patient to make sure she was doing okay.  She states that she simply tried to do too much when she got home after surgery yesterday and was really sore this morning.  She has been icing today and has taken it easy and feels much better.  I informed her to call if she had any further questions or problems.  05/31/19 at 4:17 PM by Shady Canales MD       [[id number]]

## 2021-06-09 ENCOUNTER — OFFICE VISIT (OUTPATIENT)
Dept: FAMILY MEDICINE CLINIC | Facility: CLINIC | Age: 76
End: 2021-06-09

## 2021-06-09 VITALS
SYSTOLIC BLOOD PRESSURE: 150 MMHG | BODY MASS INDEX: 26.18 KG/M2 | DIASTOLIC BLOOD PRESSURE: 91 MMHG | WEIGHT: 162.9 LBS | HEIGHT: 66 IN

## 2021-06-09 DIAGNOSIS — S76.012A STRAIN OF LEFT HIP ADDUCTOR MUSCLE, INITIAL ENCOUNTER: ICD-10-CM

## 2021-06-09 DIAGNOSIS — R10.32 GROIN PAIN, LEFT: ICD-10-CM

## 2021-06-09 DIAGNOSIS — M25.552 HIP PAIN, LEFT: Primary | ICD-10-CM

## 2021-06-09 PROCEDURE — 99213 OFFICE O/P EST LOW 20 MIN: CPT | Performed by: FAMILY MEDICINE

## 2021-06-09 RX ORDER — CYCLOBENZAPRINE HCL 5 MG
TABLET ORAL
Qty: 40 TABLET | Refills: 1 | Status: SHIPPED | OUTPATIENT
Start: 2021-06-09 | End: 2021-06-09

## 2021-06-09 RX ORDER — TRAMADOL HYDROCHLORIDE 50 MG/1
50 TABLET ORAL EVERY 8 HOURS PRN
Qty: 20 TABLET | Refills: 1 | Status: SHIPPED | OUTPATIENT
Start: 2021-06-09 | End: 2022-05-19

## 2021-06-09 NOTE — PROGRESS NOTES
Subjective   Brunilda Horner is a 76 y.o. female.  Requesting evaluation left hip pain and groin pain.  Occurred over the past 4 to 6 weeks.  Been doing a lot of bending and stooping cleaning carpets.  Pain is in the left medial groin down into the hip DrNoah pagan.  Worse with internal/external rotation flexion-extension of the hip.  Has status post right hip replacement.  Been treated conservatively at home with Tylenol and and mild stretching.  Also has a new treadmill.    History of Present Illness   HPI    The following portions of the patient's history were reviewed and updated as appropriate: allergies, current medications, past family history, past medical history, past social history, past surgical history and problem list.    Review of Systems  Review of Systems   Constitutional: Negative for activity change, appetite change, fatigue and unexpected weight change.   HENT: Negative for trouble swallowing and voice change.    Eyes: Negative for redness and visual disturbance.   Respiratory: Negative for cough and wheezing.    Cardiovascular: Negative for chest pain and palpitations.   Gastrointestinal: Negative for abdominal pain, constipation, diarrhea, nausea and vomiting.   Genitourinary: Negative for urgency.   Musculoskeletal: Positive for arthralgias and gait problem. Negative for joint swelling.   Neurological: Negative for syncope and headaches.   Hematological: Negative for adenopathy.   Psychiatric/Behavioral: Negative for sleep disturbance.       Objective   Physical Exam  Physical Exam  Constitutional:       Appearance: Normal appearance.   Cardiovascular:      Rate and Rhythm: Normal rate.      Pulses: Normal pulses.   Pulmonary:      Effort: Pulmonary effort is normal.   Abdominal:      General: Bowel sounds are normal.   Musculoskeletal:      Left hip: Tenderness present.      Comments: Left inner groin minimally tender to deep palpation at the adductor area internal extra rotation hip at  "knee causes pain internal thigh going up into the pelvic area.  Lateral trochanter is nontender.  Full flexion-extension creates pain anteriorly.  Back shows normal range of motion mild pain to anterior pelvic rim with full extension.  Gait is normal.  2+ pulses 2+ DTRs.   Neurological:      Mental Status: She is alert.   Psychiatric:         Attention and Perception: Attention normal.         Mood and Affect: Mood normal.         Speech: Speech normal.         Behavior: Behavior normal.         Thought Content: Thought content normal.         Cognition and Memory: Cognition normal.           Visit Vitals  /91   Ht 167.6 cm (66\")   Wt 73.9 kg (162 lb 14.4 oz)   LMP  (LMP Unknown)   BMI 26.29 kg/m²     Body mass index is 26.29 kg/m².      Assessment/Plan   Diagnoses and all orders for this visit:    1. Hip pain, left (Primary)  -     XR Hip With or Without Pelvis 2 - 3 View Left  -     Discontinue: cyclobenzaprine (FLEXERIL) 5 MG tablet; 1-2 tid prn hip and groin  Dispense: 40 tablet; Refill: 1  -     traMADol (ULTRAM) 50 MG tablet; Take 1 tablet by mouth Every 8 (Eight) Hours As Needed for Moderate Pain .  Dispense: 20 tablet; Refill: 1  -     Ambulatory Referral to Physical Therapy Evaluate and treat    2. Groin pain, left  -     XR Hip With or Without Pelvis 2 - 3 View Left  -     Discontinue: cyclobenzaprine (FLEXERIL) 5 MG tablet; 1-2 tid prn hip and groin  Dispense: 40 tablet; Refill: 1  -     traMADol (ULTRAM) 50 MG tablet; Take 1 tablet by mouth Every 8 (Eight) Hours As Needed for Moderate Pain .  Dispense: 20 tablet; Refill: 1  -     Ambulatory Referral to Physical Therapy Evaluate and treat    3. Strain of left hip adductor muscle, initial encounter  -     XR Hip With or Without Pelvis 2 - 3 View Left  -     Discontinue: cyclobenzaprine (FLEXERIL) 5 MG tablet; 1-2 tid prn hip and groin  Dispense: 40 tablet; Refill: 1  -     traMADol (ULTRAM) 50 MG tablet; Take 1 tablet by mouth Every 8 (Eight) Hours " As Needed for Moderate Pain .  Dispense: 20 tablet; Refill: 1  -     Ambulatory Referral to Physical Therapy Evaluate and treat    Suspect hip abductor problem.   heat rest massage stretching x-ray if x-ray negative with physical therapy symptomatic relief recheck as directed

## 2021-06-16 ENCOUNTER — APPOINTMENT (OUTPATIENT)
Dept: PHYSICAL THERAPY | Facility: HOSPITAL | Age: 76
End: 2021-06-16

## 2021-06-21 ENCOUNTER — HOSPITAL ENCOUNTER (OUTPATIENT)
Dept: PHYSICAL THERAPY | Facility: HOSPITAL | Age: 76
Setting detail: THERAPIES SERIES
Discharge: HOME | End: 2021-06-21

## 2021-06-21 DIAGNOSIS — S76.012A STRAIN OF LEFT HIP ADDUCTOR MUSCLE, INITIAL ENCOUNTER: ICD-10-CM

## 2021-06-21 DIAGNOSIS — M25.552 HIP PAIN, LEFT: ICD-10-CM

## 2021-06-21 DIAGNOSIS — R10.32 GROIN PAIN, LEFT: ICD-10-CM

## 2021-06-21 PROCEDURE — 97161 PT EVAL LOW COMPLEX 20 MIN: CPT | Performed by: PHYSICAL THERAPIST

## 2021-06-21 NOTE — THERAPY EVALUATION
Outpatient Physical Therapy Ortho Initial Evaluation  [facility]     Patient Name: Brunilda Horner  : 1945  MRN: [id number]  Today's Date: 2021      Visit Date: 2021  Attendance:  (CHAMP)  Subjective % Improvement: n/a  Recert Date: 21  MD appointment: TBD    Therapy Diagnosis: *left groin pain    Patient Active Problem List   Diagnosis   • Generalized anxiety disorder   • Chronic depression   • Bilateral primary osteoarthritis of hip   • Disorder of SI (sacroiliac) joint   • Other hyperlipidemia   • Essential hypertension   • Presence of right artificial hip joint        Past Medical History:   Diagnosis Date   • Anxiety     Generalized anxiety disorder    2015    • Arthritis    • Blepharitis 2014   • Breast cyst    • Cellulitis of foot 2016   • Depression    • History of Papanicolaou smear of cervix 2009    negative   • Hypertension    • Osteopenia    • Pure hypercholesterolemia 2015        Past Surgical History:   Procedure Laterality Date   • BREAST BIOPSY     • BREAST SURGERY  1994    Excision of mass, left breast. Mass, left breast   • COLONOSCOPY  2010    no polyps   • FOOT SURGERY  2001    Removal of tumor, radical resection of soft tissue, right foot. Recurrent plantar fibroma, plantar, right foot   • MAMMO STEREOTACTIC BREAST BIOPSY 1ST W WO DEVICE  1988    Needle localization and excision of micro-calcifications. Micro-calcifications, left breast   • SKIN BIOPSY  1991    Tangential excision keratoses left cheek. Exicison 25 papilloma, neck.   • TOTAL HIP ARTHROPLASTY Right 2019    Procedure: TOTAL HIP ARTHROPLASTY ANTERIOR - right;  Surgeon: Shady Canales MD;  Location: [facility];  Service: Orthopedics       Visit Dx:     ICD-10-CM ICD-9-CM   1. Hip pain, left  M25.552 719.45   2. Groin pain, left  R10.32 789.04   3. Strain of left hip adductor muscle, initial encounter  S76.012A 843.8  "        Patient History     Row Name 06/21/21 0852             History    Date Current Problem(s) Began  -- several months   -BB      Brief Description of Current Complaint  Present today with reports of left hip pain that really started after puppies were sick and she bent over many times that led to the pain. Reports starting to notice while walking and the left hip felt like it was going to give way. Xrays negative. States taking pain medicine and muscle relaxor that assisted pain  and is now much better but can still feel it in groin.   -BB      Patient/Caregiver Goals  Return to prior level of function  -BB      Patient's Rating of General Health  Very good  -BB      Hand Dominance  right-handed  -BB      Occupation/sports/leisure activities  retired   -BB         Pain     Pain Location  Hip  -BB      Pain at Present  0  -BB      Pain at Best  10  -BB         Fall Risk Assessment    Any falls in the past year:  Yes  -BB      Number of falls reported in the last 12 months  1-2 \"got in a hurry\"  -BB        User Key  (r) = Recorded By, (t) = Taken By, (c) = Cosigned By    Initials Name Provider Type    Shanthi Pena PT DPT Physical Therapist          PT Ortho     Row Name 06/21/21 0852       Subjective Comments    Subjective Comments  see pt hx   -BB       Precautions and Contraindications    Precautions  hx of right KRISTIN in 2019   -BB       Subjective Pain    Able to rate subjective pain?  yes  -BB    Pre-Treatment Pain Level  0  -BB    Post-Treatment Pain Level  2  -BB       Posture/Observations    Posture/Observations Comments  no significant antalgics noted. independent in transfers. reported discomfort in full supine position. Possible slight left posterior rotation SIJ   -BB       Special Tests/Palpation    Special Tests/Palpation  Hip  -BB       Hip/Thigh Palpation    Hip/Thigh Palpation?  Yes  -BB    Anterior Capsule  Left:;Tender  -BB    Iliopsoas  Left:;Tender  -BB    ASIS  Left:;Tender  -BB       " MMT (Manual Muscle Testing)    General MMT Comments  hip AROM is WFL with pain in figure 4. Able to perform trunk bridge without significant pain  -BB       Sensation    Sensation WNL?  WNL  -BB       Transfers    Comment (Transfers)  independent in all   -BB       Gait/Stairs (Locomotion)    Comment (Gait/Stairs)  no significant antalgics note   -BB      User Key  (r) = Recorded By, (t) = Taken By, (c) = Cosigned By    Initials Name Provider Type    Shanthi Pena PT DPT Physical Therapist                      Therapy Education  Education Details: ice, gentle MFR to self     PT OP Goals     Row Name 06/21/21 0852          PT Short Term Goals    STG Date to Achieve  07/12/21  -BB     STG 1  Complete 6' walk test without increased pain   -BB     STG 2  CTSIB composite score of 1.0 or better   -BB     STG 3  No reported increased pain with MMT for hip flexion   -BB        Time Calculation    PT Goal Re-Cert Due Date  07/12/21  -BB       User Key  (r) = Recorded By, (t) = Taken By, (c) = Cosigned By    Initials Name Provider Type    Shanthi Pena PT DPT Physical Therapist          PT Assessment/Plan     Row Name 06/21/21 0852          PT Assessment    Functional Limitations  Impaired gait;Limitations in functional capacity and performance;Performance in leisure activities;Impaired locomotion  -BB     Impairments  Gait;Impaired muscle endurance;Balance;Muscle strength;Pain;Poor body mechanics;Range of motion  -BB     Assessment Comments  Patient presents today with left groin pain pinpoint after forward flexion of repetitive movements. Patient is noted today to have tenderness of the iliopsoas, ASIS region. Patient is noted to have limited figure 4 position with pain in hip noted anteriorly. Patient is suspected to have a slight left posterior rotation of SIJ. MET and long arm distraction performed today to assist sIJ position and muscle gaurding inhibition. Patient could benefit form skilled PT to address  anterior hip/groin pain as well as functional balance deficits. Patient is noted to be a fall risk according to biodex CTSIB testing today.  -BB     Rehab Potential  Good  -BB     Patient/caregiver participated in establishment of treatment plan and goals  Yes  -BB     Patient would benefit from skilled therapy intervention  Yes  -BB        PT Plan    PT Frequency  2x/week  -BB     Predicted Duration of Therapy Intervention (PT)  3 weeks  -BB     Planned CPT's?  PT EVAL LOW COMPLEXITY: 05197;PT THER PROC EA 15 MIN: 36732;PT RE-EVAL: 02905;PT THER ACT EA 15 MIN: 53080;PT MANUAL THERAPY EA 15 MIN: 78655;PT GAIT TRAINING EA 15 MIN: 77518;PT SELF CARE/HOME MGMT/TRAIN EA 15: 28026;PT THER SUPP EA 15 MIN  -BB     PT Plan Comments  manual therapy for joint mobilizations and to muscles, stretching, strength as pain allows, monitor SI alignment PRN, balance with and without biodex   -BB       User Key  (r) = Recorded By, (t) = Taken By, (c) = Cosigned By    Initials Name Provider Type    Shanthi Pena PT DPT Physical Therapist            OP Exercises     Row Name 06/21/21 0852             Subjective Comments    Subjective Comments  see pt hx   -BB         Subjective Pain    Able to rate subjective pain?  yes  -BB      Pre-Treatment Pain Level  0  -BB      Post-Treatment Pain Level  2  -BB         Exercise 1    Exercise Name 1  MET for slight left posterior rotation   -BB         Exercise 2    Exercise Name 2  Left long arm distraction   -BB         Exercise 3    Exercise Name 3  MFR to ilipsoas   -BB         Exercise 4    Exercise Name 4  CTSIB testing   -BB      Additional Comments  composite score of 1.93- is a fall risk   -BB        User Key  (r) = Recorded By, (t) = Taken By, (c) = Cosigned By    Initials Name Provider Type    Shanthi Pena, ROXIE DPT Physical Therapist                        Outcome Measure Options: Lower Extremity Functional Scale (LEFS)  Lower Extremity Functional Index  Any of your usual work,  housework or school activities: No difficulty  Your usual hobbies, recreational or sporting activities: No difficulty  Getting into or out of the bath: No difficulty  Walking between rooms: No difficulty  Putting on your shoes or socks: A little bit of difficulty  Squatting: Moderate difficulty  Lifting an object, like a bag of groceries from the floor: No difficulty  Performing light activities around your home: A little bit of difficulty  Performing heavy activities around your home: Moderate difficulty  Getting into or out of a car: No difficulty  Walking 2 blocks: A little bit of difficulty  Walking a mile: A little bit of difficulty  Going up or down 10 stairs (about 1 flight of stairs): Moderate difficulty  Standing for 1 hour: Moderate difficulty  Sitting for 1 hour: No difficulty  Running on even ground: A little bit of difficulty  Running on uneven ground: A little bit of difficulty  Making sharp turns while running fast: Moderate difficulty  Hopping: A little bit of difficulty  Rolling over in bed: No difficulty  Total: 63      Time Calculation:     Start Time: 0852  Stop Time: 0920  Time Calculation (min): 28 min     Therapy Charges for Today     Code Description Service Date Service Provider Modifiers Qty    [id number] HC PT EVAL LOW COMPLEXITY 2 6/21/2021 Shanthi Holt, PT DPT GP 1          PT G-Codes  Outcome Measure Options: Lower Extremity Functional Scale (LEFS)  Total: 63         Shanthi Holt PT DPT  6/21/2021

## 2021-06-23 ENCOUNTER — APPOINTMENT (OUTPATIENT)
Dept: PHYSICAL THERAPY | Facility: HOSPITAL | Age: 76
End: 2021-06-23

## 2021-06-29 ENCOUNTER — HOSPITAL ENCOUNTER (OUTPATIENT)
Dept: PHYSICAL THERAPY | Facility: HOSPITAL | Age: 76
Setting detail: THERAPIES SERIES
Discharge: HOME | End: 2021-06-29

## 2021-06-29 DIAGNOSIS — R10.32 GROIN PAIN, LEFT: ICD-10-CM

## 2021-06-29 DIAGNOSIS — S76.012A STRAIN OF LEFT HIP ADDUCTOR MUSCLE, INITIAL ENCOUNTER: ICD-10-CM

## 2021-06-29 DIAGNOSIS — M25.552 HIP PAIN, LEFT: Primary | ICD-10-CM

## 2021-06-29 PROCEDURE — 97110 THERAPEUTIC EXERCISES: CPT

## 2021-06-29 NOTE — THERAPY TREATMENT NOTE
Outpatient Physical Therapy Ortho Treatment Note  [facility]     Patient Name: Brunilda Horner  : 1945  MRN: [id number]  Today's Date: 2021      Visit Date: 2021     Subjective Improvement 0  Visits 2/2  Visits approved 5 visits from 2021 to 2021 with $45 co-pay  RTMD PRN  Recert Date 2021    Left groin pain    Visit Dx:    ICD-10-CM ICD-9-CM   1. Hip pain, left  M25.552 719.45   2. Groin pain, left  R10.32 789.04   3. Strain of left hip adductor muscle, initial encounter  S76.012A 843.8       Patient Active Problem List   Diagnosis   • Generalized anxiety disorder   • Chronic depression   • Bilateral primary osteoarthritis of hip   • Disorder of SI (sacroiliac) joint   • Other hyperlipidemia   • Essential hypertension   • Presence of right artificial hip joint        Past Medical History:   Diagnosis Date   • Anxiety     Generalized anxiety disorder    2015    • Arthritis    • Blepharitis 2014   • Breast cyst    • Cellulitis of foot 2016   • Depression    • History of Papanicolaou smear of cervix 2009    negative   • Hypertension    • Osteopenia    • Pure hypercholesterolemia 2015        Past Surgical History:   Procedure Laterality Date   • BREAST BIOPSY     • BREAST SURGERY  1994    Excision of mass, left breast. Mass, left breast   • COLONOSCOPY  2010    no polyps   • FOOT SURGERY  2001    Removal of tumor, radical resection of soft tissue, right foot. Recurrent plantar fibroma, plantar, right foot   • MAMMO STEREOTACTIC BREAST BIOPSY 1ST W WO DEVICE  1988    Needle localization and excision of micro-calcifications. Micro-calcifications, left breast   • SKIN BIOPSY  1991    Tangential excision keratoses left cheek. Exicison 25 papilloma, neck.   • TOTAL HIP ARTHROPLASTY Right 2019    Procedure: TOTAL HIP ARTHROPLASTY ANTERIOR - right;  Surgeon: Shady Canales MD;  Location: [facility];  Service:  Orthopedics       PT Ortho     Row Name 06/29/21 1100       Precautions and Contraindications    Precautions  hx of right KRISTIN in 2019   -CP       Posture/Observations    Posture/Observations Comments  no significant antalgics noted. independent in transfers. reported discomfort in full supine position. Possible slight left posterior rotation SIJ   -CP       Special Tests/Palpation    Special Tests/Palpation  Hip  -CP       Sensation    Sensation WNL?  WNL  -CP      User Key  (r) = Recorded By, (t) = Taken By, (c) = Cosigned By    Initials Name Provider Type    Freda Raines PTA Physical Therapy Assistant                      PT Assessment/Plan     Row Name 06/29/21 1154          PT Assessment    Assessment Comments  Patient presents without any left hip pain.  However, after left hip fl stretch, patient did report left hip pain.  She reported pain with PROM to left hip and with attempted Manual piriformis stretch.  -CP        PT Plan    PT Frequency  2x/week  -CP     Predicted Duration of Therapy Intervention (PT)  4-6 weeks  -CP     PT Plan Comments  Cont with POC.  monitor SI rotations, hip AD squeezes  -CP       User Key  (r) = Recorded By, (t) = Taken By, (c) = Cosigned By    Initials Name Provider Type    Freda Raines PTA Physical Therapy Assistant          Modalities     Row Name 06/29/21 1100             Moist Heat    MH Applied  Yes  -CP      Location  left groin.hip area  -CP      PT Moist Heat Minutes  15  -CP      MH S/P Rx  Yes  -CP        User Key  (r) = Recorded By, (t) = Taken By, (c) = Cosigned By    Initials Name Provider Type    Freda Raines PTA Physical Therapy Assistant        OP Exercises     Row Name 06/29/21 1302 06/29/21 1100          Subjective Comments    Subjective Comments  --  patient believes that her left groin pain came for squatting while cleaning the carpets  At present she does not have any pain.  She reports pain first thing in the morning and at times  "when walking  -CP        Subjective Pain    Able to rate subjective pain?  --  yes  -CP     Pre-Treatment Pain Level  --  0  -CP     Post-Treatment Pain Level  --  0  -CP        Total Minutes    21053 - PT Therapeutic Exercise Minutes  45  -CP  --        Exercise 1    Exercise Name 1  --  Pro II level 1  -CP     Time 1  --  10  -CP        Exercise 2    Exercise Name 2  --  incline stretch  -CP     Cueing 2  --  Verbal;Demo  -CP     Sets 2  --  3  -CP     Time 2  --  30  -CP        Exercise 3    Exercise Name 3  --  standing HS stretch  -CP     Cueing 3  --  Verbal;Demo  -CP     Sets 3  --  3  -CP     Time 3  --  30  -CP        Exercise 4    Exercise Name 4  --  Left hip fl stretch  -CP     Cueing 4  --  Verbal;Demo  -CP     Sets 4  --  3  -CP     Time 4  --  30  -CP        Exercise 5    Exercise Name 5  --  CR/TR  -CP     Cueing 5  --  Verbal;Demo  -CP     Sets 5  --  2  -CP     Reps 5  --  10  -CP        Exercise 6    Exercise Name 6  --  supine BKFO  -CP     Cueing 6  --  Verbal;Tactile  -CP     Sets 6  --  2  -CP     Reps 6  --  10  -CP     Time 6  --  5\" holds  -CP        Exercise 7    Exercise Name 7  --  PROM Left hip  -CP        Exercise 8    Exercise Name 8  --  left hip distraction  -CP        Exercise 9    Exercise Name 9  --  SI alignment check  -CP       User Key  (r) = Recorded By, (t) = Taken By, (c) = Cosigned By    Initials Name Provider Type    CP Freda Romero, ADAIR Physical Therapy Assistant                      Manual Rx (last 36 hours)      Manual Treatments     Row Name 06/29/21 1100             Manual Rx 1    Manual Rx 1 Location  Left SI  -CP      Manual Rx 1 Type  ME to correct post rotation  -CP        User Key  (r) = Recorded By, (t) = Taken By, (c) = Cosigned By    Initials Name Provider Type    CP Freda Romero, ADAIR Physical Therapy Assistant          PT OP Goals     Row Name 06/29/21 1100          PT Short Term Goals    STG Date to Achieve  07/12/21  -CP     STG 1  Complete 6' " walk test without increased pain   -CP     STG 1 Progress  Not Met  -CP     STG 2  CTSIB composite score of 1.0 or better   -CP     STG 2 Progress  Progressing  -CP     STG 3  No reported increased pain with MMT for hip flexion   -CP     STG 3 Progress  Not Met  -CP        Time Calculation    PT Goal Re-Cert Due Date  07/12/21  -CP       User Key  (r) = Recorded By, (t) = Taken By, (c) = Cosigned By    Initials Name Provider Type    CP Freda Romero, PTA Physical Therapy Assistant          Therapy Education  Education Details: standing HIp fl stretch, supine BKFO  Given: HEP  Program: New  How Provided: Verbal, Demonstration  Provided to: Patient  Level of Understanding: Teach back education performed, Verbalized, Demonstrated              Time Calculation:   Start Time: 1100  Stop Time: 1200  Time Calculation (min): 60 min  Total Timed Code Minutes- PT: 45 minute(s)  Timed Charges  17701 - PT Therapeutic Exercise Minutes: 45  Untimed Charges  PT Moist Heat Minutes: 15  Total Minutes  Timed Charges Total Minutes: 45  Untimed Charges Total Minutes: 15   Total Minutes: 45  Therapy Charges for Today     Code Description Service Date Service Provider Modifiers Qty    82496009956 HC PT THER SUPP EA 15 MIN 6/29/2021 Freda Romero PTA GP 1    37599155272 HC PT THER PROC EA 15 MIN 6/29/2021 Freda Romero PTA GP 3                    Freda Romero PTA  6/29/2021

## 2021-07-01 ENCOUNTER — HOSPITAL ENCOUNTER (OUTPATIENT)
Dept: PHYSICAL THERAPY | Facility: HOSPITAL | Age: 76
Setting detail: THERAPIES SERIES
Discharge: HOME | End: 2021-07-01

## 2021-07-01 DIAGNOSIS — S76.012A STRAIN OF LEFT HIP ADDUCTOR MUSCLE, INITIAL ENCOUNTER: ICD-10-CM

## 2021-07-01 DIAGNOSIS — R10.32 GROIN PAIN, LEFT: ICD-10-CM

## 2021-07-01 DIAGNOSIS — M25.552 HIP PAIN, LEFT: Primary | ICD-10-CM

## 2021-07-01 PROCEDURE — 97110 THERAPEUTIC EXERCISES: CPT

## 2021-07-01 NOTE — THERAPY TREATMENT NOTE
Outpatient Physical Therapy Ortho Treatment Note  [facility]     Patient Name: Brunilda Horner  : 1945  MRN: [id number]  Today's Date: 2021      Visit Date: 2021     Subjective Improvement 0  Visits 3/3  Visits approved 5 visits from 2021 to  with $45 co-pay  RTMD PRN  Recert 2021    Left groin pain    Visit Dx:    ICD-10-CM ICD-9-CM   1. Hip pain, left  M25.552 719.45   2. Groin pain, left  R10.32 789.04   3. Strain of left hip adductor muscle, initial encounter  S76.012A 843.8       Patient Active Problem List   Diagnosis   • Generalized anxiety disorder   • Chronic depression   • Bilateral primary osteoarthritis of hip   • Disorder of SI (sacroiliac) joint   • Other hyperlipidemia   • Essential hypertension   • Presence of right artificial hip joint        Past Medical History:   Diagnosis Date   • Anxiety     Generalized anxiety disorder    2015    • Arthritis    • Blepharitis 2014   • Breast cyst    • Cellulitis of foot 2016   • Depression    • History of Papanicolaou smear of cervix 2009    negative   • Hypertension    • Osteopenia    • Pure hypercholesterolemia 2015        Past Surgical History:   Procedure Laterality Date   • BREAST BIOPSY     • BREAST SURGERY  1994    Excision of mass, left breast. Mass, left breast   • COLONOSCOPY  2010    no polyps   • FOOT SURGERY  2001    Removal of tumor, radical resection of soft tissue, right foot. Recurrent plantar fibroma, plantar, right foot   • MAMMO STEREOTACTIC BREAST BIOPSY 1ST W WO DEVICE  1988    Needle localization and excision of micro-calcifications. Micro-calcifications, left breast   • SKIN BIOPSY  1991    Tangential excision keratoses left cheek. Exicison 25 papilloma, neck.   • TOTAL HIP ARTHROPLASTY Right 2019    Procedure: TOTAL HIP ARTHROPLASTY ANTERIOR - right;  Surgeon: Shady Canales MD;  Location: [facility] OR;  Service:  Orthopedics       PT Ortho     Row Name 07/01/21 1100       Subjective Comments    Subjective Comments  Now pain at the resent time.  she does have pain when she walks  -CP       Precautions and Contraindications    Precautions  hx of right KRISTIN in 2019   -CP       Subjective Pain    Able to rate subjective pain?  yes  -CP    Pre-Treatment Pain Level  0  -CP    Subjective Pain Comment  1.5/10 pain with walking  -CP       Posture/Observations    Posture/Observations Comments  no significant antalgics noted. independent in transfers. reported discomfort in full supine position. Possible slight left posterior rotation SIJ   -CP       Special Tests/Palpation    Special Tests/Palpation  Hip  -CP       Sensation    Sensation WNL?  WNL  -CP      User Key  (r) = Recorded By, (t) = Taken By, (c) = Cosigned By    Initials Name Provider Type    Freda Raines PTA Physical Therapy Assistant                      PT Assessment/Plan     Row Name 07/01/21 1150          PT Assessment    Assessment Comments  Very good tolerance to ther ex today.  No pain with up and down ramp.  Patient did have some some with PROM and hooklying BKFO  -CP        PT Plan    PT Frequency  2x/week  -CP     Predicted Duration of Therapy Intervention (PT)  4-6 weeks  -CP     PT Plan Comments  Cont with POC.  Hip IR/ER with tband  -CP       User Key  (r) = Recorded By, (t) = Taken By, (c) = Cosigned By    Initials Name Provider Type    Freda Raines PTA Physical Therapy Assistant          Modalities     Row Name 07/01/21 1100             Moist Heat    MH Applied  Yes  -CP      Location  left groin hip area  -CP      PT Moist Heat Minutes  15  -CP      MH S/P Rx  Yes  -CP        User Key  (r) = Recorded By, (t) = Taken By, (c) = Cosigned By    Initials Name Provider Type    Freda Raines PTA Physical Therapy Assistant        OP Exercises     Row Name 07/01/21 1318 07/01/21 1100          Subjective Comments    Subjective Comments  --   "Now pain at the resent time.  she does have pain when she walks  -CP        Subjective Pain    Able to rate subjective pain?  --  yes  -CP     Pre-Treatment Pain Level  --  0  -CP     Post-Treatment Pain Level  --  0  -CP     Subjective Pain Comment  --  1.5/10 pain with walking  -CP        Total Minutes    02300 - PT Therapeutic Exercise Minutes  44  -CP  --        Exercise 1    Exercise Name 1  --  Pro II level 3  -CP     Time 1  --  10  -CP        Exercise 2    Exercise Name 2  --  incline stretch  -CP     Cueing 2  --  Verbal;Demo  -CP     Sets 2  --  3  -CP     Time 2  --  30  -CP        Exercise 3    Exercise Name 3  --  standing HS stretch  -CP     Cueing 3  --  Verbal;Demo  -CP     Sets 3  --  3  -CP     Time 3  --  30  -CP        Exercise 4    Exercise Name 4  --  standing Hip fl stretch  -CP     Cueing 4  --  Verbal;Demo  -CP     Sets 4  --  3  -CP     Time 4  --  30  -CP        Exercise 5    Exercise Name 5  --  up and down ramp  -CP     Cueing 5  --  Verbal;Demo  -CP     Reps 5  --  5  -CP        Exercise 6    Exercise Name 6  --  SL stance  -CP     Cueing 6  --  Verbal;Demo  -CP     Sets 6  --  3  -CP     Time 6  --  max 12\"  -CP        Exercise 7    Exercise Name 7  --  seated hip AD squuezes  -CP     Cueing 7  --  Verbal;Tactile  -CP     Sets 7  --  2  -CP     Reps 7  --  10  -CP     Time 7  --  5\" holds  -CP        Exercise 8    Exercise Name 8  --  LAQ  -CP     Cueing 8  --  Verbal;Demo  -CP     Sets 8  --  2  -CP     Reps 8  --  10  -CP     Time 8  --  5\" holds  -CP     Additional Comments  --  2 lb AW  -CP        Exercise 9    Exercise Name 9  --  hooklying BKFO  -CP     Cueing 9  --  Verbal;Tactile  -CP     Sets 9  --  2  -CP     Reps 9  --  10  -CP        Exercise 10    Exercise Name 10  --  PROM IR/ER  -CP     Time 10  --  3  -CP        Exercise 11    Exercise Name 11  --  Inf and post hip mobs  -CP     Cueing 11  --  Verbal;Tactile  -CP     Reps 11  --  grade 2  -CP     Time 11  --  2  -CP     "    Exercise 12    Exercise Name 12  --  seated piriformis stretch  -CP     Cueing 12  --  Verbal;Demo  -CP     Reps 12  --  3  -CP     Additional Comments  --  30  -CP       User Key  (r) = Recorded By, (t) = Taken By, (c) = Cosigned By    Initials Name Provider Type    CP Freda Romero PTA Physical Therapy Assistant                       PT OP Goals     Row Name 07/01/21 1100          PT Short Term Goals    STG Date to Achieve  07/12/21  -CP     STG 1  Complete 6' walk test without increased pain   -CP     STG 1 Progress  Not Met  -CP     STG 2  CTSIB composite score of 1.0 or better   -CP     STG 2 Progress  Progressing  -CP     STG 3  No reported increased pain with MMT for hip flexion   -CP     STG 3 Progress  Not Met  -CP        Time Calculation    PT Goal Re-Cert Due Date  07/12/21  -CP       User Key  (r) = Recorded By, (t) = Taken By, (c) = Cosigned By    Initials Name Provider Type    CP Freda Romero PTA Physical Therapy Assistant          Therapy Education  Education Details: seated hip AD squeezes, seated piriformis stretch  Given: HEP  Program: New  How Provided: Verbal, Demonstration, Written  Provided to: Patient  Level of Understanding: Teach back education performed, Verbalized, Demonstrated              Time Calculation:   Start Time: 1100  Stop Time: 1159  Time Calculation (min): 59 min  Total Timed Code Minutes- PT: 44 minute(s)  Timed Charges  21624 - PT Therapeutic Exercise Minutes: 44  Untimed Charges  PT Moist Heat Minutes: 15  Total Minutes  Timed Charges Total Minutes: 44  Untimed Charges Total Minutes: 15   Total Minutes: 44  Therapy Charges for Today     Code Description Service Date Service Provider Modifiers Qty    39379076974 HC PT THER SUPP EA 15 MIN 7/1/2021 Freda Romero, PTA GP 1    38319105378 HC PT THER PROC EA 15 MIN 7/1/2021 Freda Romero PTA GP 3                    Freda Romero PTA  7/1/2021

## 2021-07-07 ENCOUNTER — APPOINTMENT (OUTPATIENT)
Dept: PHYSICAL THERAPY | Facility: HOSPITAL | Age: 76
End: 2021-07-07

## 2021-07-08 ENCOUNTER — HOSPITAL ENCOUNTER (OUTPATIENT)
Dept: PHYSICAL THERAPY | Facility: HOSPITAL | Age: 76
Setting detail: THERAPIES SERIES
Discharge: HOME | End: 2021-07-08

## 2021-07-08 DIAGNOSIS — S76.012A STRAIN OF LEFT HIP ADDUCTOR MUSCLE, INITIAL ENCOUNTER: ICD-10-CM

## 2021-07-08 DIAGNOSIS — M25.552 HIP PAIN, LEFT: Primary | ICD-10-CM

## 2021-07-08 DIAGNOSIS — R10.32 GROIN PAIN, LEFT: ICD-10-CM

## 2021-07-08 PROCEDURE — 97110 THERAPEUTIC EXERCISES: CPT | Performed by: PHYSICAL THERAPIST

## 2021-07-08 PROCEDURE — 97535 SELF CARE MNGMENT TRAINING: CPT | Performed by: PHYSICAL THERAPIST

## 2021-07-08 NOTE — THERAPY PROGRESS REPORT/RE-CERT
Outpatient Physical Therapy Ortho Progress Note  [facility]     Patient Name: Brunilda Horner  : 1945  MRN: [id number]  Today's Date: 2021      Visit Date: 2021    Visit Dx:    ICD-10-CM ICD-9-CM   1. Hip pain, left  M25.552 719.45   2. Groin pain, left  R10.32 789.04   3. Strain of left hip adductor muscle, initial encounter  S76.012A 843.8       Patient Active Problem List   Diagnosis   • Generalized anxiety disorder   • Chronic depression   • Bilateral primary osteoarthritis of hip   • Disorder of SI (sacroiliac) joint   • Other hyperlipidemia   • Essential hypertension   • Presence of right artificial hip joint        Past Medical History:   Diagnosis Date   • Anxiety     Generalized anxiety disorder    2015    • Arthritis    • Blepharitis 2014   • Breast cyst    • Cellulitis of foot 2016   • Depression    • History of Papanicolaou smear of cervix 2009    negative   • Hypertension    • Osteopenia    • Pure hypercholesterolemia 2015        Past Surgical History:   Procedure Laterality Date   • BREAST BIOPSY     • BREAST SURGERY  1994    Excision of mass, left breast. Mass, left breast   • COLONOSCOPY  2010    no polyps   • FOOT SURGERY  2001    Removal of tumor, radical resection of soft tissue, right foot. Recurrent plantar fibroma, plantar, right foot   • MAMMO STEREOTACTIC BREAST BIOPSY 1ST W WO DEVICE  1988    Needle localization and excision of micro-calcifications. Micro-calcifications, left breast   • SKIN BIOPSY  1991    Tangential excision keratoses left cheek. Exicison 25 papilloma, neck.   • TOTAL HIP ARTHROPLASTY Right 2019    Procedure: TOTAL HIP ARTHROPLASTY ANTERIOR - right;  Surgeon: Shady Canales MD;  Location: [facility];  Service: Orthopedics       PT Ortho     Row Name 21 1100       Subjective Comments    Subjective Comments  Reports being sore for a couple days after last treatment but  seems to have improved with rest. Reports about 40% improvement with original complaint.   -BB       Precautions and Contraindications    Precautions  hx of right KRISTIN in 2019   -BB       Subjective Pain    Able to rate subjective pain?  yes  -BB    Pre-Treatment Pain Level  0  -BB       Posture/Observations    Posture/Observations Comments  tenderness of hip adductors   -BB       Hip/Thigh Palpation    Hip/Thigh Palpation?  -- 2-3 ttp groin   -BB       General ROM    GENERAL ROM COMMENTS  hip AROM is WNL- slight limitation of hip ER with pulling   -BB       MMT (Manual Muscle Testing)    General MMT Comments  MMT of hip er/ir: 5/5 no pain, knee flexion/ext:5/5, hip flexion: 4+/5 with pain   -BB       Sensation    Sensation WNL?  WNL  -BB       Transfers    Comment (Transfers)  independent in all   -BB      User Key  (r) = Recorded By, (t) = Taken By, (c) = Cosigned By    Initials Name Provider Type    Shanthi Pena PT DPT Physical Therapist                      PT Assessment/Plan     Row Name 07/08/21 1100          PT Assessment    Functional Limitations  Impaired gait;Limitations in functional capacity and performance;Performance in leisure activities;Impaired locomotion  -BB     Impairments  Gait;Impaired muscle endurance;Balance;Muscle strength;Pain;Poor body mechanics;Range of motion  -BB     Assessment Comments  Patient presents today with improving tenderness and improved pain with gait but pain persists. Pain seems muscular in nature and responds to stretching. Discussed with patient progression of balance and HEP with patient agreeing. 2 more visits to progress and finalize HEP   -BB     Rehab Potential  Good  -BB     Patient/caregiver participated in establishment of treatment plan and goals  Yes  -BB     Patient would benefit from skilled therapy intervention  Yes  -BB        PT Plan    PT Frequency  2x/week  -BB     Predicted Duration of Therapy Intervention (PT)  2 more visits   -BB     PT Plan  "Comments  progress HEP, balance, strength, stretching, manual PRN   -BB       User Key  (r) = Recorded By, (t) = Taken By, (c) = Cosigned By    Initials Name Provider Type    Shanthi Pena PT DPT Physical Therapist            OP Exercises     Row Name 07/08/21 1100             Subjective Comments    Subjective Comments  Reports being sore for a couple days after last treatment but seems to have improved with rest. Reports about 40% improvement with original complaint.   -BB         Subjective Pain    Able to rate subjective pain?  yes  -BB      Pre-Treatment Pain Level  0  -BB      Post-Treatment Pain Level  0  -BB         Exercise 1    Exercise Name 1  recheck   -BB         Exercise 2    Exercise Name 2  6' walk test   -BB      Time 2  6'  -BB      Additional Comments  \"mild\" pain that remained stable   -BB         Exercise 3    Exercise Name 3  manual hip adductor stretch   -BB      Sets 3  3  -BB      Time 3  30\"  -BB         Exercise 4    Exercise Name 4  manual gentle long arm distraction   -BB      Sets 4  10   -BB      Time 4  3\" oscillations   -BB         Exercise 5    Exercise Name 5  Manual hip ER stretch   -BB      Sets 5  3  -BB      Time 5  30\"  -BB         Exercise 6    Exercise Name 6  leg extended ER stretch   -BB      Sets 6  3  -BB      Time 6  30\"  -BB         Exercise 7    Exercise Name 7  reported slight improved pain in walking after stretching   -BB         Exercise 8    Exercise Name 8  CTSIB   -BB      Additional Comments  1.98composite score- no signficant improvements noted   -BB         Exercise 9    Exercise Name 9  Discussed balance HEP with countertop close by for hand/ue assist PRN. Discussed possible covering of right eye to progress balance using left due to macular degeneration   -BB        User Key  (r) = Recorded By, (t) = Taken By, (c) = Cosigned By    Initials Name Provider Type    Shanthi Pena PT DPT Physical Therapist                       PT OP Goals     Row Name " 07/08/21 1100          PT Short Term Goals    STG 1  Complete 6' walk test without increased pain   -BB     STG 1 Progress  Partially Met  -BB     STG 1 Progress Comments  slight increased initially but remained constant   -BB     STG 2  CTSIB composite score of 1.0 or better   -BB     STG 2 Progress  Not Met  -BB     STG 2 Progress Comments  1.98  -BB     STG 3  No reported increased pain with MMT for hip flexion   -BB     STG 3 Progress  Partially Met  -BB     STG 3 Progress Comments  pain is present but reports pain is less   -BB        Time Calculation    PT Goal Re-Cert Due Date  07/22/21  -BB       User Key  (r) = Recorded By, (t) = Taken By, (c) = Cosigned By    Initials Name Provider Type    Shanthi Pena, PT DPT Physical Therapist                         Time Calculation:   Start Time: 1102  Stop Time: 1144  Time Calculation (min): 42 min  Therapy Charges for Today     Code Description Service Date Service Provider Modifiers Qty    31813026415 HC PT THER PROC EA 15 MIN 7/8/2021 Shanthi Holt PT DPT GP 2    07618347237 HC PT SELF CARE/MGMT/TRAIN EA 15 MIN 7/8/2021 Shanthi Holt PT DPT GP 1                    Shanthi Holt PT DPT  7/8/2021

## 2021-07-13 ENCOUNTER — HOSPITAL ENCOUNTER (OUTPATIENT)
Dept: PHYSICAL THERAPY | Facility: HOSPITAL | Age: 76
Setting detail: THERAPIES SERIES
Discharge: HOME | End: 2021-07-13

## 2021-07-13 DIAGNOSIS — S76.012A STRAIN OF LEFT HIP ADDUCTOR MUSCLE, INITIAL ENCOUNTER: ICD-10-CM

## 2021-07-13 DIAGNOSIS — R10.32 GROIN PAIN, LEFT: ICD-10-CM

## 2021-07-13 DIAGNOSIS — M25.552 HIP PAIN, LEFT: Primary | ICD-10-CM

## 2021-07-13 NOTE — THERAPY TREATMENT NOTE
Outpatient Physical Therapy Ortho Treatment Note  [facility]     Patient Name: Brunilda Horner  : 1945  MRN: [id number]  Today's Date: 2021      Visit Date: 2021  Subjective Improvement: some  MD visit: CHAMP  Visit Number:   Total Approved:5 visit  Recert Date: 21  Visit Dx:    ICD-10-CM ICD-9-CM   1. Hip pain, left  M25.552 719.45   2. Groin pain, left  R10.32 789.04   3. Strain of left hip adductor muscle, initial encounter  S76.012A 843.8       Patient Active Problem List   Diagnosis   • Generalized anxiety disorder   • Chronic depression   • Bilateral primary osteoarthritis of hip   • Disorder of SI (sacroiliac) joint   • Other hyperlipidemia   • Essential hypertension   • Presence of right artificial hip joint        Past Medical History:   Diagnosis Date   • Anxiety     Generalized anxiety disorder    2015    • Arthritis    • Blepharitis 2014   • Breast cyst    • Cellulitis of foot 2016   • Depression    • History of Papanicolaou smear of cervix 2009    negative   • Hypertension    • Osteopenia    • Pure hypercholesterolemia 2015        Past Surgical History:   Procedure Laterality Date   • BREAST BIOPSY     • BREAST SURGERY  1994    Excision of mass, left breast. Mass, left breast   • COLONOSCOPY  2010    no polyps   • FOOT SURGERY  2001    Removal of tumor, radical resection of soft tissue, right foot. Recurrent plantar fibroma, plantar, right foot   • MAMMO STEREOTACTIC BREAST BIOPSY 1ST W WO DEVICE  1988    Needle localization and excision of micro-calcifications. Micro-calcifications, left breast   • SKIN BIOPSY  1991    Tangential excision keratoses left cheek. Exicison 25 papilloma, neck.   • TOTAL HIP ARTHROPLASTY Right 2019    Procedure: TOTAL HIP ARTHROPLASTY ANTERIOR - right;  Surgeon: Shady Canales MD;  Location: [facility];  Service: Orthopedics       PT Ortho     Row Name 21 1300        Subjective Comments    Subjective Comments  Pt denies pain after treatment. pt declined moist heat today. Pt feels that she has improved.  -TL       Precautions and Contraindications    Precautions  hx of right KRISTIN in 2019   -TL       Subjective Pain    Able to rate subjective pain?  yes  -TL    Pre-Treatment Pain Level  0  -TL    Post-Treatment Pain Level  0  -TL      User Key  (r) = Recorded By, (t) = Taken By, (c) = Cosigned By    Initials Name Provider Type    Tracey Campos PTA Physical Therapy Assistant                      PT Assessment/Plan     Row Name 07/13/21 1300          PT Assessment    Assessment Comments  Pt has met short term goals #1. pt tolerated new strengthening ex this date.  See educational tab. Educated pt on using pillow between knee to level out hip.    -TL        PT Plan    PT Frequency  2x/week  -TL     Predicted Duration of Therapy Intervention (PT)  2 more visits  -TL     PT Plan Comments  c/c next visit according to duration of POC.  -TL       User Key  (r) = Recorded By, (t) = Taken By, (c) = Cosigned By    Initials Name Provider Type    Tracey Campos PTA Physical Therapy Assistant            OP Exercises     Row Name 07/13/21 1300             Subjective Comments    Subjective Comments  Pt denies pain after treatment. pt declined moist heat today. Pt feels that she has improved.  -TL         Subjective Pain    Able to rate subjective pain?  yes  -TL      Pre-Treatment Pain Level  0  -TL      Post-Treatment Pain Level  0  -TL         Total Minutes    40863 - PT Therapeutic Exercise Minutes  46  -TL         Exercise 1    Exercise Name 1  6 min walk test  -TL      Reps 1  4 laps  -TL      Additional Comments  no increase  -TL         Exercise 2    Exercise Name 2  clams  -TL      Sets 2  2  -TL      Reps 2  10  -TL      Time 2  5  -TL         Exercise 3    Exercise Name 3  reverse clams  -TL      Sets 3  2  -TL      Reps 3  10  -TL         Exercise 4    Exercise Name 4   bridge with hip abd  -TL      Sets 4  2  -TL      Reps 4  10  -TL      Time 4  5 sec hold  -TL         Exercise 5    Exercise Name 5  Prone on elbow  -TL      Time 5  3mins  -TL         Exercise 6    Exercise Name 6  prone SLR  -TL      Sets 6  1  -TL      Reps 6  5 reps  -TL         Exercise 7    Exercise Name 7  supine hip flexor S  -TL      Reps 7  2  -TL      Time 7  1 mins  -TL        User Key  (r) = Recorded By, (t) = Taken By, (c) = Cosigned By    Initials Name Provider Type    Tracey Campos PTA Physical Therapy Assistant                       PT OP Goals     Row Name 07/13/21 1300          PT Short Term Goals    STG 1  Complete 6' walk test without increased pain   -TL     STG 1 Progress  Met  -TL     STG 1 Progress Comments  pt denies increase pain with 6min walk test  -TL     STG 2  CTSIB composite score of 1.0 or better   -TL     STG 2 Progress  Not Met  -TL     STG 3  No reported increased pain with MMT for hip flexion   -TL     STG 3 Progress  Partially Met  -TL        Time Calculation    PT Goal Re-Cert Due Date  07/22/21  -TL       User Key  (r) = Recorded By, (t) = Taken By, (c) = Cosigned By    Initials Name Provider Type    Tracey Campos PTA Physical Therapy Assistant          Therapy Education  Education Details: Education place a pillow between knees and ankles at night to support hip, clams, bridging with hip abd, hip flexor S  Given: HEP, Symptoms/condition management  Program: New, Reinforced  How Provided: Verbal, Demonstration, Written  Provided to: Patient  Level of Understanding: Teach back education performed, Verbalized, Demonstrated              Time Calculation:   Start Time: 1300  Stop Time: 1346  Time Calculation (min): 46 min  Timed Charges  51287 - PT Therapeutic Exercise Minutes: 46  Total Minutes  Timed Charges Total Minutes: 46   Total Minutes: 46                Tracey Hernandez PTA  7/13/2021

## 2021-07-15 ENCOUNTER — HOSPITAL ENCOUNTER (OUTPATIENT)
Dept: PHYSICAL THERAPY | Facility: HOSPITAL | Age: 76
Setting detail: THERAPIES SERIES
Discharge: HOME | End: 2021-07-15

## 2021-07-15 DIAGNOSIS — M25.552 HIP PAIN, LEFT: Primary | ICD-10-CM

## 2021-07-15 DIAGNOSIS — S76.012A STRAIN OF LEFT HIP ADDUCTOR MUSCLE, INITIAL ENCOUNTER: ICD-10-CM

## 2021-07-15 DIAGNOSIS — R10.32 GROIN PAIN, LEFT: ICD-10-CM

## 2021-07-15 PROCEDURE — 97535 SELF CARE MNGMENT TRAINING: CPT | Performed by: PHYSICAL THERAPIST

## 2021-07-15 NOTE — THERAPY DISCHARGE NOTE
Outpatient Physical Therapy Ortho Treatment Note/Discharge Summary  [facility]     Patient Name: Brunilda Horner  : 1945  MRN: [id number]  Today's Date: 7/15/2021      Visit Date: 07/15/2021    Visit Dx:    ICD-10-CM ICD-9-CM   1. Hip pain, left  M25.552 719.45   2. Groin pain, left  R10.32 789.04   3. Strain of left hip adductor muscle, initial encounter  S76.012A 843.8       Patient Active Problem List   Diagnosis   • Generalized anxiety disorder   • Chronic depression   • Bilateral primary osteoarthritis of hip   • Disorder of SI (sacroiliac) joint   • Other hyperlipidemia   • Essential hypertension   • Presence of right artificial hip joint        Past Medical History:   Diagnosis Date   • Anxiety     Generalized anxiety disorder    2015    • Arthritis    • Blepharitis 2014   • Breast cyst    • Cellulitis of foot 2016   • Depression    • History of Papanicolaou smear of cervix 2009    negative   • Hypertension    • Osteopenia    • Pure hypercholesterolemia 2015        Past Surgical History:   Procedure Laterality Date   • BREAST BIOPSY     • BREAST SURGERY  1994    Excision of mass, left breast. Mass, left breast   • COLONOSCOPY  2010    no polyps   • FOOT SURGERY  2001    Removal of tumor, radical resection of soft tissue, right foot. Recurrent plantar fibroma, plantar, right foot   • MAMMO STEREOTACTIC BREAST BIOPSY 1ST W WO DEVICE  1988    Needle localization and excision of micro-calcifications. Micro-calcifications, left breast   • SKIN BIOPSY  1991    Tangential excision keratoses left cheek. Exicison 25 papilloma, neck.   • TOTAL HIP ARTHROPLASTY Right 2019    Procedure: TOTAL HIP ARTHROPLASTY ANTERIOR - right;  Surgeon: Shady Canales MD;  Location: [facility];  Service: Orthopedics       PT Ortho     Row Name 07/15/21 1100       Subjective Comments    Subjective Comments  Reports feeling 98% better. Still have mild  tendernress of hip flexor.   -BB       Precautions and Contraindications    Precautions  hx of right KRISTIN in 2019   -BB       Subjective Pain    Able to rate subjective pain?  yes  -BB    Pre-Treatment Pain Level  0  -BB    Post-Treatment Pain Level  0  -BB       Posture/Observations    Posture/Observations Comments  mild tenderness of hip flexor and quad insertion proximally   -BB       MMT (Manual Muscle Testing)    General MMT Comments  hip grossly 5/5 with pain in left hip   -BB      User Key  (r) = Recorded By, (t) = Taken By, (c) = Cosigned By    Initials Name Provider Type    Shanthi Pena, PT DPT Physical Therapist                      PT Assessment/Plan     Row Name 07/15/21 1100          PT Assessment    Assessment Comments  Patient has progressed with symptom complaints. Patient is compliant in HEP and progression. Ready to DC Discussed ice and stretching/massage to continue symptoms and rest PRN.   -BB        PT Plan    PT Plan Comments  DC   -BB       User Key  (r) = Recorded By, (t) = Taken By, (c) = Cosigned By    Initials Name Provider Type    Shanthi Pena, PT DPT Physical Therapist              OP Exercises     Row Name 07/15/21 1100             Subjective Comments    Subjective Comments  Reports feeling 98% better. Still have mild tendernress of hip flexor.   -BB         Subjective Pain    Able to rate subjective pain?  yes  -BB      Pre-Treatment Pain Level  0  -BB      Post-Treatment Pain Level  0  -BB         Exercise 1    Exercise Name 1  palpation/POC/ HEP review   -BB         Exercise 2    Exercise Name 2  CTSIB   -BB      Additional Comments  composite 1.88  -BB        User Key  (r) = Recorded By, (t) = Taken By, (c) = Cosigned By    Initials Name Provider Type    Shanthi Pena, PT DPT Physical Therapist                         PT OP Goals     Row Name 07/15/21 1100          PT Short Term Goals    STG 1  Complete 6' walk test without increased pain   -BB     STG 1 Progress  Met   "-BB     STG 2  CTSIB composite score of 1.0 or better   -BB     STG 2 Progress  Not Met  -BB     STG 3  No reported increased pain with MMT for hip flexion   -BB     STG 3 Progress  Partially Met  -BB     STG 3 Progress Comments  \"feel it \" MMT 5/5   -BB       User Key  (r) = Recorded By, (t) = Taken By, (c) = Cosigned By    Initials Name Provider Type    Shanthi Pena, PT DPT Physical Therapist                         Time Calculation:   Start Time: 1105  Stop Time: 1129  Time Calculation (min): 24 min  Therapy Charges for Today     Code Description Service Date Service Provider Modifiers Qty    [id number]  PT SELF CARE/MGMT/TRAIN EA 15 MIN 7/15/2021 Shanthi Holt, PT DPT GP 1    [id number]  PT THER SUPP EA 15 MIN 7/15/2021 Shanthi Holt, PT DPT GP 1                       Shanthi Holt PT DPT  7/15/2021       "

## 2021-07-20 ENCOUNTER — APPOINTMENT (OUTPATIENT)
Dept: PHYSICAL THERAPY | Facility: HOSPITAL | Age: 76
End: 2021-07-20

## 2021-10-18 DIAGNOSIS — F32.A CHRONIC DEPRESSION: Chronic | ICD-10-CM

## 2021-10-18 RX ORDER — TEMAZEPAM 30 MG/1
30 CAPSULE ORAL NIGHTLY PRN
Qty: 30 CAPSULE | Refills: 4 | Status: SHIPPED | OUTPATIENT
Start: 2021-10-18 | End: 2022-03-17 | Stop reason: SDUPTHER

## 2021-11-16 ENCOUNTER — LAB (OUTPATIENT)
Dept: LAB | Facility: HOSPITAL | Age: 76
End: 2021-11-16

## 2021-11-16 DIAGNOSIS — E78.49 OTHER HYPERLIPIDEMIA: Chronic | ICD-10-CM

## 2021-11-16 DIAGNOSIS — I10 ESSENTIAL HYPERTENSION: Chronic | ICD-10-CM

## 2021-11-16 LAB
ALBUMIN SERPL-MCNC: 4.2 G/DL (ref 3.5–5.2)
ALBUMIN/GLOB SERPL: 1.3 G/DL
ALP SERPL-CCNC: 190 U/L (ref 39–117)
ALT SERPL W P-5'-P-CCNC: 30 U/L (ref 1–33)
ANION GAP SERPL CALCULATED.3IONS-SCNC: 12.2 MMOL/L (ref 5–15)
AST SERPL-CCNC: 43 U/L (ref 1–32)
BILIRUB SERPL-MCNC: 0.3 MG/DL (ref 0–1.2)
BUN SERPL-MCNC: 16 MG/DL (ref 8–23)
BUN/CREAT SERPL: 15.8 (ref 7–25)
CALCIUM SPEC-SCNC: 9.9 MG/DL (ref 8.6–10.5)
CHLORIDE SERPL-SCNC: 100 MMOL/L (ref 98–107)
CHOLEST SERPL-MCNC: 212 MG/DL (ref 0–200)
CO2 SERPL-SCNC: 25.8 MMOL/L (ref 22–29)
CREAT SERPL-MCNC: 1.01 MG/DL (ref 0.57–1)
GFR SERPL CREATININE-BSD FRML MDRD: 53 ML/MIN/1.73
GLOBULIN UR ELPH-MCNC: 3.2 GM/DL
GLUCOSE SERPL-MCNC: 77 MG/DL (ref 65–99)
HDLC SERPL-MCNC: 110 MG/DL (ref 40–60)
LDLC SERPL CALC-MCNC: 91 MG/DL (ref 0–100)
LDLC/HDLC SERPL: 0.82 {RATIO}
MAGNESIUM SERPL-MCNC: 1.8 MG/DL (ref 1.6–2.4)
POTASSIUM SERPL-SCNC: 4.8 MMOL/L (ref 3.5–5.2)
PROT SERPL-MCNC: 7.4 G/DL (ref 6–8.5)
SODIUM SERPL-SCNC: 138 MMOL/L (ref 136–145)
TRIGL SERPL-MCNC: 59 MG/DL (ref 0–150)
VLDLC SERPL-MCNC: 11 MG/DL (ref 5–40)

## 2021-11-16 PROCEDURE — 36415 COLL VENOUS BLD VENIPUNCTURE: CPT

## 2021-11-16 PROCEDURE — 80061 LIPID PANEL: CPT

## 2021-11-16 PROCEDURE — 83735 ASSAY OF MAGNESIUM: CPT

## 2021-11-16 PROCEDURE — 80053 COMPREHEN METABOLIC PANEL: CPT

## 2021-11-18 ENCOUNTER — OFFICE VISIT (OUTPATIENT)
Dept: FAMILY MEDICINE CLINIC | Facility: CLINIC | Age: 76
End: 2021-11-18

## 2021-11-18 VITALS
WEIGHT: 159.6 LBS | BODY MASS INDEX: 25.65 KG/M2 | SYSTOLIC BLOOD PRESSURE: 148 MMHG | DIASTOLIC BLOOD PRESSURE: 88 MMHG | HEIGHT: 66 IN

## 2021-11-18 DIAGNOSIS — Z12.31 SCREENING MAMMOGRAM FOR BREAST CANCER: ICD-10-CM

## 2021-11-18 DIAGNOSIS — Z23 NEED FOR VACCINATION: ICD-10-CM

## 2021-11-18 DIAGNOSIS — F32.A CHRONIC DEPRESSION: Chronic | ICD-10-CM

## 2021-11-18 DIAGNOSIS — F41.1 GENERALIZED ANXIETY DISORDER: Chronic | ICD-10-CM

## 2021-11-18 DIAGNOSIS — M16.0 BILATERAL PRIMARY OSTEOARTHRITIS OF HIP: Chronic | ICD-10-CM

## 2021-11-18 DIAGNOSIS — E78.49 OTHER HYPERLIPIDEMIA: Chronic | ICD-10-CM

## 2021-11-18 DIAGNOSIS — H35.3212 EXUDATIVE AGE-RELATED MACULAR DEGENERATION, RIGHT EYE, WITH INACTIVE CHOROIDAL NEOVASCULARIZATION (HCC): ICD-10-CM

## 2021-11-18 DIAGNOSIS — I10 ESSENTIAL HYPERTENSION: Primary | Chronic | ICD-10-CM

## 2021-11-18 PROCEDURE — 90662 IIV NO PRSV INCREASED AG IM: CPT | Performed by: FAMILY MEDICINE

## 2021-11-18 PROCEDURE — G0008 ADMIN INFLUENZA VIRUS VAC: HCPCS | Performed by: FAMILY MEDICINE

## 2021-11-18 PROCEDURE — 99214 OFFICE O/P EST MOD 30 MIN: CPT | Performed by: FAMILY MEDICINE

## 2021-11-18 RX ORDER — BUPROPION HYDROCHLORIDE 300 MG/1
300 TABLET ORAL DAILY
Qty: 90 TABLET | Refills: 1 | Status: SHIPPED | OUTPATIENT
Start: 2021-11-18 | End: 2022-05-19 | Stop reason: SDUPTHER

## 2021-11-18 RX ORDER — ESCITALOPRAM OXALATE 10 MG/1
10 TABLET ORAL DAILY
Qty: 90 TABLET | Refills: 1 | Status: SHIPPED | OUTPATIENT
Start: 2021-11-18 | End: 2022-05-19 | Stop reason: SDUPTHER

## 2021-11-18 NOTE — PROGRESS NOTES
Subjective   Brunilda Horner is a 76 y.o. female.  Reevaluation hypertension hyperlipidemia history of generalized anxiety disorder depressive disorder recurrent history of osteoarthritis of the hips diagnoses below in the interim lab work acceptable is remaining active keeping weight down artificial hip working well.  Has had a negative Cologuard last year.  Feels well otherwise.  Is up-to-date on all other.    History of Present Illness   HPI    The following portions of the patient's history were reviewed and updated as appropriate: allergies, current medications, past family history, past medical history, past social history, past surgical history and problem list.    Review of Systems  Review of Systems   Constitutional: Negative for activity change, appetite change, fatigue and unexpected weight change.   HENT: Negative for trouble swallowing and voice change.    Eyes: Negative for redness and visual disturbance.   Respiratory: Negative for cough and wheezing.    Cardiovascular: Negative for chest pain and palpitations.   Gastrointestinal: Negative for abdominal pain, constipation, diarrhea, nausea and vomiting.   Genitourinary: Negative for urgency.   Musculoskeletal: Positive for arthralgias. Negative for joint swelling.   Neurological: Negative for syncope and headaches.   Hematological: Negative for adenopathy.   Psychiatric/Behavioral: Negative for sleep disturbance.       Objective   Physical Exam  Physical Exam  Constitutional:       Appearance: She is well-developed.   HENT:      Head: Normocephalic.   Eyes:      Pupils: Pupils are equal, round, and reactive to light.   Neck:      Thyroid: No thyromegaly.   Cardiovascular:      Rate and Rhythm: Normal rate and regular rhythm.      Heart sounds: Normal heart sounds. No murmur heard.  No friction rub. No gallop.    Pulmonary:      Breath sounds: Normal breath sounds.   Abdominal:      General: There is no distension.      Palpations: Abdomen is soft.  There is no mass.      Tenderness: There is no abdominal tenderness.   Musculoskeletal:         General: Normal range of motion.      Cervical back: Normal range of motion.      Comments: No peripheral edema get up and go 3 to 5 seconds gait normal   Skin:     General: Skin is warm and dry.   Neurological:      Mental Status: She is alert and oriented to person, place, and time.      Deep Tendon Reflexes: Reflexes are normal and symmetric.   Psychiatric:         Attention and Perception: Attention normal.         Mood and Affect: Mood normal.         Speech: Speech normal.         Behavior: Behavior normal.         Thought Content: Thought content normal.         Cognition and Memory: Cognition normal.      Comments: Normal affect on current medicine       Results for orders placed or performed in visit on 11/16/21   Magnesium    Specimen: Blood   Result Value Ref Range    Magnesium 1.8 1.6 - 2.4 mg/dL   Lipid Panel With LDL / HDL Ratio    Specimen: Blood   Result Value Ref Range    Total Cholesterol 212 (H) 0 - 200 mg/dL    Triglycerides 59 0 - 150 mg/dL    HDL Cholesterol 110 (H) 40 - 60 mg/dL    LDL Cholesterol  91 0 - 100 mg/dL    VLDL Cholesterol 11 5 - 40 mg/dL    LDL/HDL Ratio 0.82    Comprehensive Metabolic Panel    Specimen: Blood   Result Value Ref Range    Glucose 77 65 - 99 mg/dL    BUN 16 8 - 23 mg/dL    Creatinine 1.01 (H) 0.57 - 1.00 mg/dL    Sodium 138 136 - 145 mmol/L    Potassium 4.8 3.5 - 5.2 mmol/L    Chloride 100 98 - 107 mmol/L    CO2 25.8 22.0 - 29.0 mmol/L    Calcium 9.9 8.6 - 10.5 mg/dL    Total Protein 7.4 6.0 - 8.5 g/dL    Albumin 4.20 3.50 - 5.20 g/dL    ALT (SGPT) 30 1 - 33 U/L    AST (SGOT) 43 (H) 1 - 32 U/L    Alkaline Phosphatase 190 (H) 39 - 117 U/L    Total Bilirubin 0.3 0.0 - 1.2 mg/dL    eGFR Non African Amer 53 (L) >60 mL/min/1.73    Globulin 3.2 gm/dL    A/G Ratio 1.3 g/dL    BUN/Creatinine Ratio 15.8 7.0 - 25.0    Anion Gap 12.2 5.0 - 15.0 mmol/L           Visit Vitals  BP  "148/88   Ht 167.6 cm (66\")   Wt 72.4 kg (159 lb 9.6 oz)   LMP  (LMP Unknown)   BMI 25.76 kg/m²     Body mass index is 25.76 kg/m².    /88   Ht 167.6 cm (66\")   Wt 72.4 kg (159 lb 9.6 oz)   LMP  (LMP Unknown)   BMI 25.76 kg/m²     Assessment/Plan   Diagnoses and all orders for this visit:    1. Essential hypertension (Primary)    2. Other hyperlipidemia    3. Generalized anxiety disorder    4. Bilateral primary osteoarthritis of hip    5. Exudative age-related macular degeneration, right eye, with inactive choroidal neovascularization (HCC)    6. Chronic depression  -     buPROPion XL (WELLBUTRIN XL) 300 MG 24 hr tablet; Take 1 tablet by mouth Daily.  Dispense: 90 tablet; Refill: 1  -     escitalopram (LEXAPRO) 10 MG tablet; Take 1 tablet by mouth Daily.  Dispense: 90 tablet; Refill: 1    7. Need for vaccination  -     Fluzone High-Dose 65+yrs    8. Screening mammogram for breast cancer  -     Mammo Screening Digital Tomosynthesis Bilateral With CAD; Future    Counseled mainly on fall prevention hydration lifestyle measures continue medicines as outlined.  Orders as above recheck 6 months will be time for subsequent Medicare  "

## 2021-11-23 DIAGNOSIS — M16.0 BILATERAL PRIMARY OSTEOARTHRITIS OF HIP: Chronic | ICD-10-CM

## 2021-11-23 RX ORDER — CLONIDINE HYDROCHLORIDE 0.1 MG/1
TABLET ORAL
Qty: 90 TABLET | Refills: 3 | Status: SHIPPED | OUTPATIENT
Start: 2021-11-23 | End: 2022-04-25

## 2021-11-23 RX ORDER — TIZANIDINE 4 MG/1
TABLET ORAL
Qty: 90 TABLET | Refills: 3 | Status: SHIPPED | OUTPATIENT
Start: 2021-11-23 | End: 2022-05-19 | Stop reason: SDUPTHER

## 2022-02-11 DIAGNOSIS — E78.49 OTHER HYPERLIPIDEMIA: Chronic | ICD-10-CM

## 2022-02-11 RX ORDER — ATORVASTATIN CALCIUM 10 MG/1
TABLET, FILM COATED ORAL
Qty: 90 TABLET | Refills: 3 | Status: SHIPPED | OUTPATIENT
Start: 2022-02-11 | End: 2023-02-03

## 2022-03-17 DIAGNOSIS — F32.A CHRONIC DEPRESSION: Chronic | ICD-10-CM

## 2022-03-17 RX ORDER — TEMAZEPAM 30 MG/1
30 CAPSULE ORAL NIGHTLY PRN
Qty: 30 CAPSULE | Refills: 4 | Status: SHIPPED | OUTPATIENT
Start: 2022-03-17 | End: 2022-08-15

## 2022-04-25 RX ORDER — CLONIDINE HYDROCHLORIDE 0.1 MG/1
TABLET ORAL
Qty: 90 TABLET | Refills: 3 | Status: SHIPPED | OUTPATIENT
Start: 2022-04-25 | End: 2022-11-08

## 2022-05-19 ENCOUNTER — OFFICE VISIT (OUTPATIENT)
Dept: FAMILY MEDICINE CLINIC | Facility: CLINIC | Age: 77
End: 2022-05-19

## 2022-05-19 VITALS
HEIGHT: 66 IN | SYSTOLIC BLOOD PRESSURE: 132 MMHG | BODY MASS INDEX: 26.2 KG/M2 | WEIGHT: 163 LBS | DIASTOLIC BLOOD PRESSURE: 80 MMHG

## 2022-05-19 DIAGNOSIS — Z12.31 SCREENING MAMMOGRAM FOR BREAST CANCER: ICD-10-CM

## 2022-05-19 DIAGNOSIS — F32.A CHRONIC DEPRESSION: Chronic | ICD-10-CM

## 2022-05-19 DIAGNOSIS — Z00.00 MEDICARE ANNUAL WELLNESS VISIT, SUBSEQUENT: ICD-10-CM

## 2022-05-19 DIAGNOSIS — I10 ESSENTIAL HYPERTENSION: Primary | Chronic | ICD-10-CM

## 2022-05-19 DIAGNOSIS — M16.0 BILATERAL PRIMARY OSTEOARTHRITIS OF HIP: Chronic | ICD-10-CM

## 2022-05-19 DIAGNOSIS — E78.49 OTHER HYPERLIPIDEMIA: Chronic | ICD-10-CM

## 2022-05-19 DIAGNOSIS — H35.3212 EXUDATIVE AGE-RELATED MACULAR DEGENERATION, RIGHT EYE, WITH INACTIVE CHOROIDAL NEOVASCULARIZATION: ICD-10-CM

## 2022-05-19 DIAGNOSIS — R21 RASH: ICD-10-CM

## 2022-05-19 DIAGNOSIS — L40.9 SCALP PSORIASIS: ICD-10-CM

## 2022-05-19 PROCEDURE — 99214 OFFICE O/P EST MOD 30 MIN: CPT | Performed by: FAMILY MEDICINE

## 2022-05-19 PROCEDURE — 1126F AMNT PAIN NOTED NONE PRSNT: CPT | Performed by: FAMILY MEDICINE

## 2022-05-19 PROCEDURE — G0439 PPPS, SUBSEQ VISIT: HCPCS | Performed by: FAMILY MEDICINE

## 2022-05-19 PROCEDURE — 1170F FXNL STATUS ASSESSED: CPT | Performed by: FAMILY MEDICINE

## 2022-05-19 PROCEDURE — 96160 PT-FOCUSED HLTH RISK ASSMT: CPT | Performed by: FAMILY MEDICINE

## 2022-05-19 PROCEDURE — 1159F MED LIST DOCD IN RCRD: CPT | Performed by: FAMILY MEDICINE

## 2022-05-19 RX ORDER — CLOBETASOL PROPIONATE 0.46 MG/ML
SOLUTION TOPICAL
Qty: 50 ML | Refills: 1 | Status: SHIPPED | OUTPATIENT
Start: 2022-05-19 | End: 2022-05-31 | Stop reason: SDUPTHER

## 2022-05-19 RX ORDER — LISINOPRIL 10 MG/1
10 TABLET ORAL DAILY
Qty: 90 TABLET | Refills: 3 | Status: SHIPPED | OUTPATIENT
Start: 2022-05-19

## 2022-05-19 RX ORDER — ESCITALOPRAM OXALATE 10 MG/1
10 TABLET ORAL DAILY
Qty: 90 TABLET | Refills: 1 | Status: SHIPPED | OUTPATIENT
Start: 2022-05-19 | End: 2022-11-18 | Stop reason: SDUPTHER

## 2022-05-19 RX ORDER — TIZANIDINE 4 MG/1
4 TABLET ORAL NIGHTLY PRN
Qty: 90 TABLET | Refills: 3 | Status: SHIPPED | OUTPATIENT
Start: 2022-05-19

## 2022-05-19 RX ORDER — BUPROPION HYDROCHLORIDE 300 MG/1
300 TABLET ORAL DAILY
Qty: 90 TABLET | Refills: 1 | Status: SHIPPED | OUTPATIENT
Start: 2022-05-19 | End: 2022-11-18 | Stop reason: SDUPTHER

## 2022-05-19 NOTE — PROGRESS NOTES
The ABCs of the Annual Wellness Visit  Subsequent Medicare Wellness Visit    Chief Complaint   Patient presents with   • Hypertension     6 month real      Subjective    History of Present Illness:  Brunilda Horner is a 77 y.o. female who presents for a Subsequent Medicare Wellness Visit.    The following portions of the patient's history were reviewed and   updated as appropriate: allergies, current medications, past family history, past medical history, past social history, past surgical history and problem list.    Compared to one year ago, the patient feels her physical   health is the same.    Compared to one year ago, the patient feels her mental   health is the same.    Recent Hospitalizations:  She was not admitted to the hospital during the last year.       Current Medical Providers:  Patient Care Team:  Jesus Krause MD as PCP - General (Family Medicine)  Dena Carter APRN as Nurse Practitioner (Orthopedic Surgery)  Shady Canales MD as Surgeon (Orthopedic Surgery)    Outpatient Medications Prior to Visit   Medication Sig Dispense Refill   • atorvastatin (LIPITOR) 10 MG tablet TAKE ONE TABLET BY MOUTH ONCE NIGHTLY 90 tablet 3   • Biotin 10 MG capsule Take  by mouth.     • Calcium Carbonate-Vitamin D (CALTRATE 600+D PO) Take  by mouth 2 (Two) Times a Day.     • cholecalciferol (VITAMIN D3) 1000 UNITS tablet Take 1,000 Units by mouth daily.     • cloNIDine (CATAPRES) 0.1 MG tablet TAKE ONE TABLET BY MOUTH THREE TIMES A DAY AS NEEDED FOR ELEVATED FOR BLOOD PRESSURE 90 tablet 3   • Multiple Vitamins-Minerals (ICAPS AREDS 2 PO) Take  by mouth 2 (Two) Times a Day.     • Multiple Vitamins-Minerals (MULTIVITAMIN ADULT) tablet Take 1 tablet by mouth Daily.     • Omega-3 Fatty Acids (OMEGA-3 PO) Take  by mouth Daily.     • temazepam (RESTORIL) 30 MG capsule Take 1 capsule by mouth At Night As Needed for Sleep. 30 capsule 4   • vitamin E 400 UNIT capsule Take 400 Units by mouth daily.     • buPROPion XL  "(WELLBUTRIN XL) 300 MG 24 hr tablet Take 1 tablet by mouth Daily. 90 tablet 1   • escitalopram (LEXAPRO) 10 MG tablet Take 1 tablet by mouth Daily. 90 tablet 1   • hydrOXYzine (ATARAX) 25 MG tablet Take 1 tablet by mouth Every 8 (Eight) Hours As Needed for Itching. 30 tablet 0   • lisinopril (PRINIVIL,ZESTRIL) 10 MG tablet Take 1 tablet by mouth Daily. 90 tablet 3   • tiZANidine (ZANAFLEX) 4 MG tablet TAKE ONE TABLET BY MOUTH EVERY NIGHT AS NEEDED FOR MUSCLE SPASMS 90 tablet 3   • traMADol (ULTRAM) 50 MG tablet Take 1 tablet by mouth Every 8 (Eight) Hours As Needed for Moderate Pain . 20 tablet 1     No facility-administered medications prior to visit.       No opioid medication identified on active medication list. I have reviewed chart for other potential  high risk medication/s and harmful drug interactions in the elderly.          Aspirin is not on active medication list.  Aspirin use is not indicated based on review of current medical condition/s. Risk of harm outweighs potential benefits.  .    Patient Active Problem List   Diagnosis   • Generalized anxiety disorder   • Chronic depression   • Bilateral primary osteoarthritis of hip   • Disorder of SI (sacroiliac) joint   • Other hyperlipidemia   • Essential hypertension   • Presence of right artificial hip joint   • Exudative age-related macular degeneration, right eye, with inactive choroidal neovascularization (HCC)   • Scalp psoriasis     Advance Care Planning  Advance Directive is on file.  ACP discussion was held with the patient during this visit. Patient has an advance directive in EMR which is still valid.           Objective    Vitals:    05/19/22 1316   BP: 132/80   Weight: 73.9 kg (163 lb)   Height: 167.6 cm (66\")   PainSc: 0-No pain     BMI is >= 25 and < 30. (Overweight) The following options were offered after discussion: not applicable  Does the patient have evidence of cognitive impairment? No    Physical Exam            HEALTH RISK " ASSESSMENT    Smoking Status:  Social History     Tobacco Use   Smoking Status Former Smoker   • Packs/day: 1.00   • Years: 10.00   • Pack years: 10.00   • Quit date: 1976   • Years since quittin.4   Smokeless Tobacco Never Used     Alcohol Consumption:  Social History     Substance and Sexual Activity   Alcohol Use Yes   • Alcohol/week: 2.0 standard drinks   • Types: 2 Glasses of wine per week     Fall Risk Screen:    HERNANDEZADI Fall Risk Assessment has not been completed.    Depression Screening:  PHQ-2/PHQ-9 Depression Screening 2021   Retired PHQ-9 Total Score 0   Retired Total Score 0       Health Habits and Functional and Cognitive Screening:  No flowsheet data found.    Age-appropriate Screening Schedule:  Refer to the list below for future screening recommendations based on patient's age, sex and/or medical conditions. Orders for these recommended tests are listed in the plan section. The patient has been provided with a written plan.    Health Maintenance   Topic Date Due   • INFLUENZA VACCINE  2022   • TDAP/TD VACCINES (2 - Td or Tdap) 2022   • LIPID PANEL  2022   • MAMMOGRAM  2023   • DXA SCAN  Discontinued   • ZOSTER VACCINE  Discontinued              Assessment & Plan   CMS Preventative Services Quick Reference  Risk Factors Identified During Encounter  Hearing Problem  Immunizations Discussed/Encouraged (specific Immunizations; COVID19  The above risks/problems have been discussed with the patient.  Follow up actions/plans if indicated are seen below in the Assessment/Plan Section.  Pertinent information has been shared with the patient in the After Visit Summary.    Diagnoses and all orders for this visit:    1. Essential hypertension (Primary)  -     Magnesium; Future  -     Comprehensive Metabolic Panel; Future  -     lisinopril (PRINIVIL,ZESTRIL) 10 MG tablet; Take 1 tablet by mouth Daily.  Dispense: 90 tablet; Refill: 3    2. Other hyperlipidemia  -     Lipid  Panel With LDL / HDL Ratio; Future    3. Chronic depression  -     buPROPion XL (WELLBUTRIN XL) 300 MG 24 hr tablet; Take 1 tablet by mouth Daily.  Dispense: 90 tablet; Refill: 1  -     escitalopram (LEXAPRO) 10 MG tablet; Take 1 tablet by mouth Daily.  Dispense: 90 tablet; Refill: 1    4. Exudative age-related macular degeneration, right eye, with inactive choroidal neovascularization (HCC)    5. Screening mammogram for breast cancer  -     Mammo Screening Digital Tomosynthesis Bilateral With CAD    6. Medicare annual wellness visit, subsequent    7. Rash    8. Scalp psoriasis  -     clobetasol (TEMOVATE) 0.05 % external solution; Ply to affected area on the scalp twice daily for 3 weeks then as needed  Dispense: 50 mL; Refill: 1    9. Bilateral primary osteoarthritis of hip  -     tiZANidine (ZANAFLEX) 4 MG tablet; Take 1 tablet by mouth At Night As Needed for Muscle Spasms.  Dispense: 90 tablet; Refill: 3        Follow Up:   No follow-ups on file.     An After Visit Summary and PPPS were made available to the patient.

## 2022-05-19 NOTE — PROGRESS NOTES
Subjective   Brunilda Horner is a 77 y.o. female.  Reevaluation hypertension hyperlipidemia DJD of the hips chronic depression diagnoses below.  In the interim medicines have remained the same needing refills.  Last lab work acceptable.  Is behind on mammogram this is reordered.  Has developed a scaly rash on the scalp for last several months not going away.  Has not had it in the past.  Counseled on COVID boosters #4 today.    History of Present Illness   HPI    The following portions of the patient's history were reviewed and updated as appropriate: allergies, current medications, past family history, past medical history, past social history, past surgical history and problem list.    Review of Systems  Review of Systems   Constitutional: Negative for activity change, appetite change, fatigue and unexpected weight change.   HENT: Negative for trouble swallowing and voice change.    Eyes: Negative for redness and visual disturbance.   Respiratory: Negative for cough and wheezing.    Cardiovascular: Negative for chest pain and palpitations.   Gastrointestinal: Negative for abdominal pain, constipation, diarrhea, nausea and vomiting.   Genitourinary: Negative for urgency.   Musculoskeletal: Positive for arthralgias (Chronic stable). Negative for joint swelling.   Skin: Positive for rash.   Neurological: Negative for syncope and headaches.   Hematological: Negative for adenopathy.   Psychiatric/Behavioral: Positive for dysphoric mood (Stable on medication). Negative for sleep disturbance.       Objective   Physical Exam  Physical Exam  Constitutional:       Appearance: She is well-developed.   HENT:      Head: Normocephalic.   Eyes:      Pupils: Pupils are equal, round, and reactive to light.   Neck:      Thyroid: No thyromegaly.   Cardiovascular:      Rate and Rhythm: Normal rate and regular rhythm.      Heart sounds: Normal heart sounds. No murmur heard.    No friction rub. No gallop.   Pulmonary:      Breath  "sounds: Normal breath sounds.   Abdominal:      General: There is no distension.      Palpations: Abdomen is soft. There is no mass.      Tenderness: There is no abdominal tenderness.   Musculoskeletal:         General: Normal range of motion.      Cervical back: Normal range of motion.      Comments: No peripheral edema 1+ 2+ pulses get up and go 3 to 5 seconds normal gait slightly stiffened to begin than normal   Skin:     General: Skin is warm and dry.      Comments: Scalp parietal midline to the right shows a raised hyperkeratotic rash semicircular to circular in condition in shape consistent with what looks like mild scalp psoriasis localized.  No evidence of seborrhea.  No seborrheic rash on the neck.   Neurological:      Mental Status: She is alert and oriented to person, place, and time.      Deep Tendon Reflexes: Reflexes are normal and symmetric.   Psychiatric:         Attention and Perception: Attention normal.         Mood and Affect: Mood normal.         Speech: Speech normal.         Behavior: Behavior normal.         Thought Content: Thought content normal.         Cognition and Memory: Cognition normal.           Visit Vitals  /80   Ht 167.6 cm (66\")   Wt 73.9 kg (163 lb)   LMP  (LMP Unknown)   BMI 26.31 kg/m²     Body mass index is 26.31 kg/m².  /80   Ht 167.6 cm (66\")   Wt 73.9 kg (163 lb)   LMP  (LMP Unknown)   BMI 26.31 kg/m²       Assessment/Plan   Diagnoses and all orders for this visit:    1. Essential hypertension (Primary)  -     Magnesium; Future  -     Comprehensive Metabolic Panel; Future  -     lisinopril (PRINIVIL,ZESTRIL) 10 MG tablet; Take 1 tablet by mouth Daily.  Dispense: 90 tablet; Refill: 3    2. Other hyperlipidemia  -     Lipid Panel With LDL / HDL Ratio; Future    3. Chronic depression  -     buPROPion XL (WELLBUTRIN XL) 300 MG 24 hr tablet; Take 1 tablet by mouth Daily.  Dispense: 90 tablet; Refill: 1  -     escitalopram (LEXAPRO) 10 MG tablet; Take 1 tablet " by mouth Daily.  Dispense: 90 tablet; Refill: 1    4. Exudative age-related macular degeneration, right eye, with inactive choroidal neovascularization (HCC)    5. Screening mammogram for breast cancer  -     Mammo Screening Digital Tomosynthesis Bilateral With CAD    6. Medicare annual wellness visit, subsequent    7. Rash    8. Scalp psoriasis  -     clobetasol (TEMOVATE) 0.05 % external solution; Ply to affected area on the scalp twice daily for 3 weeks then as needed  Dispense: 50 mL; Refill: 1    9. Bilateral primary osteoarthritis of hip  -     tiZANidine (ZANAFLEX) 4 MG tablet; Take 1 tablet by mouth At Night As Needed for Muscle Spasms.  Dispense: 90 tablet; Refill: 3    For the rash counseled on salts and continued shampoos to help with flaking mid potency Lidex or equivalent solution probably Temovate for 3 weeks and as needed counseled these process recheck of any problem after that.  Recheck again in 6 months with lab prior also scheduled the above

## 2022-05-31 ENCOUNTER — TELEPHONE (OUTPATIENT)
Dept: FAMILY MEDICINE CLINIC | Facility: CLINIC | Age: 77
End: 2022-05-31

## 2022-05-31 DIAGNOSIS — L40.9 SCALP PSORIASIS: ICD-10-CM

## 2022-05-31 RX ORDER — CLOBETASOL PROPIONATE 0.46 MG/ML
SOLUTION TOPICAL
Qty: 100 ML | Refills: 2 | Status: SHIPPED | OUTPATIENT
Start: 2022-05-31 | End: 2023-02-06

## 2022-08-13 DIAGNOSIS — F32.A CHRONIC DEPRESSION: Chronic | ICD-10-CM

## 2022-08-15 RX ORDER — TEMAZEPAM 30 MG/1
CAPSULE ORAL
Qty: 30 CAPSULE | Refills: 0 | Status: SHIPPED | OUTPATIENT
Start: 2022-08-15 | End: 2022-09-14 | Stop reason: SDUPTHER

## 2022-09-13 DIAGNOSIS — F32.A CHRONIC DEPRESSION: Chronic | ICD-10-CM

## 2022-09-13 RX ORDER — TEMAZEPAM 30 MG/1
CAPSULE ORAL
Qty: 30 CAPSULE | Refills: 0 | Status: CANCELLED | OUTPATIENT
Start: 2022-09-13

## 2022-09-14 DIAGNOSIS — F32.A CHRONIC DEPRESSION: Chronic | ICD-10-CM

## 2022-09-14 RX ORDER — TEMAZEPAM 30 MG/1
30 CAPSULE ORAL NIGHTLY PRN
Qty: 30 CAPSULE | Refills: 5 | Status: SHIPPED | OUTPATIENT
Start: 2022-09-14 | End: 2023-03-13 | Stop reason: SDUPTHER

## 2022-11-08 RX ORDER — CLONIDINE HYDROCHLORIDE 0.1 MG/1
TABLET ORAL
Qty: 90 TABLET | Refills: 3 | Status: SHIPPED | OUTPATIENT
Start: 2022-11-08

## 2022-11-16 ENCOUNTER — LAB (OUTPATIENT)
Dept: LAB | Facility: HOSPITAL | Age: 77
End: 2022-11-16

## 2022-11-16 DIAGNOSIS — I10 ESSENTIAL HYPERTENSION: Chronic | ICD-10-CM

## 2022-11-16 DIAGNOSIS — E78.49 OTHER HYPERLIPIDEMIA: Chronic | ICD-10-CM

## 2022-11-16 PROCEDURE — 80061 LIPID PANEL: CPT

## 2022-11-16 PROCEDURE — 83735 ASSAY OF MAGNESIUM: CPT

## 2022-11-16 PROCEDURE — 80053 COMPREHEN METABOLIC PANEL: CPT

## 2022-11-16 PROCEDURE — 36415 COLL VENOUS BLD VENIPUNCTURE: CPT

## 2022-11-17 LAB
ALBUMIN SERPL-MCNC: 4.5 G/DL (ref 3.5–5.2)
ALBUMIN/GLOB SERPL: 1.7 G/DL
ALP SERPL-CCNC: 154 U/L (ref 39–117)
ALT SERPL W P-5'-P-CCNC: 20 U/L (ref 1–33)
ANION GAP SERPL CALCULATED.3IONS-SCNC: 14.3 MMOL/L (ref 5–15)
AST SERPL-CCNC: 30 U/L (ref 1–32)
BILIRUB SERPL-MCNC: 0.3 MG/DL (ref 0–1.2)
BUN SERPL-MCNC: 19 MG/DL (ref 8–23)
BUN/CREAT SERPL: 17.9 (ref 7–25)
CALCIUM SPEC-SCNC: 10.1 MG/DL (ref 8.6–10.5)
CHLORIDE SERPL-SCNC: 101 MMOL/L (ref 98–107)
CHOLEST SERPL-MCNC: 238 MG/DL (ref 0–200)
CO2 SERPL-SCNC: 24.7 MMOL/L (ref 22–29)
CREAT SERPL-MCNC: 1.06 MG/DL (ref 0.57–1)
EGFRCR SERPLBLD CKD-EPI 2021: 54.6 ML/MIN/1.73
GLOBULIN UR ELPH-MCNC: 2.6 GM/DL
GLUCOSE SERPL-MCNC: 73 MG/DL (ref 65–99)
HDLC SERPL-MCNC: 110 MG/DL (ref 40–60)
LDLC SERPL CALC-MCNC: 106 MG/DL (ref 0–100)
LDLC/HDLC SERPL: 0.92 {RATIO}
MAGNESIUM SERPL-MCNC: 1.8 MG/DL (ref 1.6–2.4)
POTASSIUM SERPL-SCNC: 4.2 MMOL/L (ref 3.5–5.2)
PROT SERPL-MCNC: 7.1 G/DL (ref 6–8.5)
SODIUM SERPL-SCNC: 140 MMOL/L (ref 136–145)
TRIGL SERPL-MCNC: 133 MG/DL (ref 0–150)
VLDLC SERPL-MCNC: 22 MG/DL (ref 5–40)

## 2022-11-18 ENCOUNTER — OFFICE VISIT (OUTPATIENT)
Dept: FAMILY MEDICINE CLINIC | Facility: CLINIC | Age: 77
End: 2022-11-18

## 2022-11-18 VITALS
SYSTOLIC BLOOD PRESSURE: 136 MMHG | HEIGHT: 66 IN | DIASTOLIC BLOOD PRESSURE: 78 MMHG | WEIGHT: 158 LBS | BODY MASS INDEX: 25.39 KG/M2

## 2022-11-18 DIAGNOSIS — E78.49 OTHER HYPERLIPIDEMIA: Chronic | ICD-10-CM

## 2022-11-18 DIAGNOSIS — F32.A CHRONIC DEPRESSION: Chronic | ICD-10-CM

## 2022-11-18 DIAGNOSIS — Z23 NEED FOR VACCINATION: ICD-10-CM

## 2022-11-18 DIAGNOSIS — I10 ESSENTIAL HYPERTENSION: Primary | Chronic | ICD-10-CM

## 2022-11-18 DIAGNOSIS — Z12.31 SCREENING MAMMOGRAM FOR BREAST CANCER: ICD-10-CM

## 2022-11-18 DIAGNOSIS — L40.9 SCALP PSORIASIS: Chronic | ICD-10-CM

## 2022-11-18 PROCEDURE — 99214 OFFICE O/P EST MOD 30 MIN: CPT | Performed by: FAMILY MEDICINE

## 2022-11-18 PROCEDURE — 90662 IIV NO PRSV INCREASED AG IM: CPT | Performed by: FAMILY MEDICINE

## 2022-11-18 PROCEDURE — G0008 ADMIN INFLUENZA VIRUS VAC: HCPCS | Performed by: FAMILY MEDICINE

## 2022-11-18 RX ORDER — BUPROPION HYDROCHLORIDE 300 MG/1
300 TABLET ORAL DAILY
Qty: 90 TABLET | Refills: 1 | Status: SHIPPED | OUTPATIENT
Start: 2022-11-18

## 2022-11-18 RX ORDER — ESCITALOPRAM OXALATE 10 MG/1
10 TABLET ORAL DAILY
Qty: 90 TABLET | Refills: 1 | Status: SHIPPED | OUTPATIENT
Start: 2022-11-18

## 2022-11-18 NOTE — PROGRESS NOTES
Subjective   Brunilda Horner is a 77 y.o. female.  Evaluation mild hypertension hyperlipidemia scalp psoriasis DJD mild dysthymia.  In the interim lab work is acceptable.  Due for mammogram soon.  Feels well is maintaining activity lost some weight scalp psoriasis controlled with local medication.  Does need update on flu vaccine.  Is up-to-date on all other.    History of Present Illness   HPI    The following portions of the patient's history were reviewed and updated as appropriate: allergies, current medications, past family history, past medical history, past social history, past surgical history and problem list.    Review of Systems  Review of Systems   Constitutional: Negative for activity change, appetite change, fatigue and unexpected weight change.   HENT: Negative for trouble swallowing and voice change.    Eyes: Negative for redness and visual disturbance.   Respiratory: Negative for cough and wheezing.    Cardiovascular: Negative for chest pain and palpitations.   Gastrointestinal: Negative for abdominal pain, constipation, diarrhea, nausea and vomiting.   Genitourinary: Negative for urgency.   Musculoskeletal: Positive for arthralgias. Negative for joint swelling.   Neurological: Negative for syncope and headaches.   Hematological: Negative for adenopathy.   Psychiatric/Behavioral: Negative for sleep disturbance.       Objective   Physical Exam  Physical Exam  Constitutional:       Appearance: She is well-developed.   HENT:      Head: Normocephalic.   Eyes:      Pupils: Pupils are equal, round, and reactive to light.   Neck:      Thyroid: No thyromegaly.   Cardiovascular:      Rate and Rhythm: Normal rate and regular rhythm.      Heart sounds: Normal heart sounds. No murmur heard.    No friction rub. No gallop.   Pulmonary:      Breath sounds: Normal breath sounds.   Abdominal:      General: There is no distension.      Palpations: Abdomen is soft. There is no mass.      Tenderness: There is no  "abdominal tenderness.   Musculoskeletal:         General: Normal range of motion.      Cervical back: Normal range of motion.      Comments: Mild antalgic gait 2+ pulses 2+ DTRs get up and go 3 to 5-second   Skin:     General: Skin is warm and dry.   Neurological:      Mental Status: She is alert and oriented to person, place, and time.      Deep Tendon Reflexes: Reflexes are normal and symmetric.   Psychiatric:         Attention and Perception: Attention normal.         Mood and Affect: Mood normal.         Speech: Speech normal.         Behavior: Behavior normal.         Thought Content: Thought content normal.         Cognition and Memory: Cognition normal.      Comments: Normal affect       Results for orders placed or performed in visit on 11/16/22   Lipid Panel With LDL / HDL Ratio    Specimen: Blood   Result Value Ref Range    Total Cholesterol 238 (H) 0 - 200 mg/dL    Triglycerides 133 0 - 150 mg/dL    HDL Cholesterol 110 (H) 40 - 60 mg/dL    LDL Cholesterol  106 (H) 0 - 100 mg/dL    VLDL Cholesterol 22 5 - 40 mg/dL    LDL/HDL Ratio 0.92    Magnesium    Specimen: Blood   Result Value Ref Range    Magnesium 1.8 1.6 - 2.4 mg/dL   Comprehensive Metabolic Panel    Specimen: Blood   Result Value Ref Range    Glucose 73 65 - 99 mg/dL    BUN 19 8 - 23 mg/dL    Creatinine 1.06 (H) 0.57 - 1.00 mg/dL    Sodium 140 136 - 145 mmol/L    Potassium 4.2 3.5 - 5.2 mmol/L    Chloride 101 98 - 107 mmol/L    CO2 24.7 22.0 - 29.0 mmol/L    Calcium 10.1 8.6 - 10.5 mg/dL    Total Protein 7.1 6.0 - 8.5 g/dL    Albumin 4.50 3.50 - 5.20 g/dL    ALT (SGPT) 20 1 - 33 U/L    AST (SGOT) 30 1 - 32 U/L    Alkaline Phosphatase 154 (H) 39 - 117 U/L    Total Bilirubin 0.3 0.0 - 1.2 mg/dL    Globulin 2.6 gm/dL    A/G Ratio 1.7 g/dL    BUN/Creatinine Ratio 17.9 7.0 - 25.0    Anion Gap 14.3 5.0 - 15.0 mmol/L    eGFR 54.6 (L) >60.0 mL/min/1.73           Visit Vitals  /78   Ht 167.6 cm (66\")   Wt 71.7 kg (158 lb)   LMP  (LMP Unknown)   BMI " "25.50 kg/m²     Body mass index is 25.5 kg/m².  /78   Ht 167.6 cm (66\")   Wt 71.7 kg (158 lb)   LMP  (LMP Unknown)   BMI 25.50 kg/m²       Assessment/Plan   Diagnoses and all orders for this visit:    1. Essential hypertension (Primary)    2. Other hyperlipidemia    3. Chronic depression  -     buPROPion XL (WELLBUTRIN XL) 300 MG 24 hr tablet; Take 1 tablet by mouth Daily.  Dispense: 90 tablet; Refill: 1  -     escitalopram (LEXAPRO) 10 MG tablet; Take 1 tablet by mouth Daily.  Dispense: 90 tablet; Refill: 1    4. Scalp psoriasis    5. Need for vaccination  -     Fluzone High-Dose 65+yrs    6. Screening mammogram for breast cancer  -     Cancel: Mammo Screening Digital Tomosynthesis Bilateral With CAD; Future  -     Mammo Screening Digital Tomosynthesis Bilateral With CAD    Counseled mainly on fall prevention hydration infection prevention update immunization.  Counseled on COVID booster.  Continue medicines as outlined.  Recheck again in 6 months  "

## 2023-02-03 DIAGNOSIS — E78.49 OTHER HYPERLIPIDEMIA: Chronic | ICD-10-CM

## 2023-02-03 RX ORDER — ATORVASTATIN CALCIUM 10 MG/1
TABLET, FILM COATED ORAL
Qty: 90 TABLET | Refills: 3 | Status: SHIPPED | OUTPATIENT
Start: 2023-02-03

## 2023-02-06 DIAGNOSIS — L40.9 SCALP PSORIASIS: ICD-10-CM

## 2023-02-06 RX ORDER — CLOBETASOL PROPIONATE 0.46 MG/ML
SOLUTION TOPICAL
Qty: 100 ML | Refills: 2 | Status: SHIPPED | OUTPATIENT
Start: 2023-02-06

## 2023-03-13 DIAGNOSIS — F32.A CHRONIC DEPRESSION: Chronic | ICD-10-CM

## 2023-03-13 RX ORDER — TEMAZEPAM 30 MG/1
30 CAPSULE ORAL NIGHTLY PRN
Qty: 30 CAPSULE | Refills: 5 | Status: SHIPPED | OUTPATIENT
Start: 2023-03-13

## 2023-05-12 DIAGNOSIS — I10 ESSENTIAL HYPERTENSION: Chronic | ICD-10-CM

## 2023-05-12 RX ORDER — CLONIDINE HYDROCHLORIDE 0.1 MG/1
TABLET ORAL
Qty: 90 TABLET | Refills: 3 | Status: SHIPPED | OUTPATIENT
Start: 2023-05-12

## 2023-05-12 RX ORDER — LISINOPRIL 10 MG/1
TABLET ORAL
Qty: 90 TABLET | Refills: 3 | Status: SHIPPED | OUTPATIENT
Start: 2023-05-12

## 2023-05-16 DIAGNOSIS — Z12.31 SCREENING MAMMOGRAM FOR BREAST CANCER: Primary | ICD-10-CM

## 2023-05-17 DIAGNOSIS — F32.A CHRONIC DEPRESSION: Chronic | ICD-10-CM

## 2023-05-17 RX ORDER — ESCITALOPRAM OXALATE 10 MG/1
TABLET ORAL
Qty: 90 TABLET | Refills: 1 | Status: SHIPPED | OUTPATIENT
Start: 2023-05-17

## 2023-05-17 RX ORDER — BUPROPION HYDROCHLORIDE 300 MG/1
TABLET ORAL
Qty: 90 TABLET | Refills: 1 | Status: SHIPPED | OUTPATIENT
Start: 2023-05-17

## 2023-05-18 ENCOUNTER — OFFICE VISIT (OUTPATIENT)
Dept: FAMILY MEDICINE CLINIC | Facility: CLINIC | Age: 78
End: 2023-05-18
Payer: MEDICARE

## 2023-05-18 VITALS
HEIGHT: 66 IN | WEIGHT: 154.5 LBS | OXYGEN SATURATION: 100 % | BODY MASS INDEX: 24.83 KG/M2 | HEART RATE: 99 BPM | SYSTOLIC BLOOD PRESSURE: 136 MMHG | DIASTOLIC BLOOD PRESSURE: 78 MMHG

## 2023-05-18 DIAGNOSIS — Z12.11 SCREEN FOR COLON CANCER: ICD-10-CM

## 2023-05-18 DIAGNOSIS — H53.9 VISION CHANGES: Primary | ICD-10-CM

## 2023-05-18 DIAGNOSIS — E78.49 OTHER HYPERLIPIDEMIA: Chronic | ICD-10-CM

## 2023-05-18 DIAGNOSIS — I10 ESSENTIAL HYPERTENSION: Chronic | ICD-10-CM

## 2023-05-18 DIAGNOSIS — F32.A CHRONIC DEPRESSION: Chronic | ICD-10-CM

## 2023-05-18 DIAGNOSIS — F41.1 GENERALIZED ANXIETY DISORDER: Chronic | ICD-10-CM

## 2023-05-18 DIAGNOSIS — Z00.00 MEDICARE ANNUAL WELLNESS VISIT, SUBSEQUENT: ICD-10-CM

## 2023-05-18 DIAGNOSIS — H35.3212 EXUDATIVE AGE-RELATED MACULAR DEGENERATION, RIGHT EYE, WITH INACTIVE CHOROIDAL NEOVASCULARIZATION: ICD-10-CM

## 2023-05-18 NOTE — PROGRESS NOTES
The ABCs of the Annual Wellness Visit  Subsequent Medicare Wellness Visit    Subjective      Brunilda Horner is a 78 y.o. female who presents for a Subsequent Medicare Wellness Visit.    The following portions of the patient's history were reviewed and   updated as appropriate: allergies, current medications, past family history, past medical history, past social history, past surgical history and problem list.    Compared to one year ago, the patient feels her physical   health is the same.    Compared to one year ago, the patient feels her mental   health is the same.    Recent Hospitalizations:  She was not admitted to the hospital during the last year.       Current Medical Providers:  Patient Care Team:  Jesus Krause MD as PCP - General (Family Medicine)  Dena Carter APRN as Nurse Practitioner (Orthopedic Surgery)  Shady Canales MD as Surgeon (Orthopedic Surgery)    Outpatient Medications Prior to Visit   Medication Sig Dispense Refill   • atorvastatin (LIPITOR) 10 MG tablet TAKE ONE TABLET BY MOUTH ONCE NIGHTLY 90 tablet 3   • buPROPion XL (WELLBUTRIN XL) 300 MG 24 hr tablet TAKE ONE TABLET BY MOUTH DAILY 90 tablet 1   • Calcium Carbonate-Vitamin D (CALTRATE 600+D PO) Take  by mouth 2 (Two) Times a Day.     • cholecalciferol (VITAMIN D3) 1000 UNITS tablet Take 1,000 Units by mouth daily.     • clobetasol (TEMOVATE) 0.05 % external solution APPLY TO AFFECTED AREA(S) ON SCALP TWO TIMES A DAY FOR 3 WEEKS THEN AS NEEDED 100 mL 2   • cloNIDine (CATAPRES) 0.1 MG tablet TAKE ONE TABLET BY MOUTH THREE TIMES A DAY AS NEEDED FOR HIGH BLOOD PRESSURE 90 tablet 3   • escitalopram (LEXAPRO) 10 MG tablet TAKE ONE TABLET BY MOUTH DAILY 90 tablet 1   • lisinopril (PRINIVIL,ZESTRIL) 10 MG tablet TAKE ONE TABLET BY MOUTH DAILY 90 tablet 3   • Multiple Vitamins-Minerals (ICAPS AREDS 2 PO) Take  by mouth 2 (Two) Times a Day.     • Multiple Vitamins-Minerals (MULTIVITAMIN ADULT) tablet Take 1 tablet by mouth Daily.   "   • Omega-3 Fatty Acids (OMEGA-3 PO) Take  by mouth Daily.     • temazepam (RESTORIL) 30 MG capsule Take 1 capsule by mouth At Night As Needed for Sleep. 30 capsule 5   • tiZANidine (ZANAFLEX) 4 MG tablet Take 1 tablet by mouth At Night As Needed for Muscle Spasms. 90 tablet 3   • vitamin E 400 UNIT capsule Take 400 Units by mouth daily.       No facility-administered medications prior to visit.       No opioid medication identified on active medication list. I have reviewed chart for other potential  high risk medication/s and harmful drug interactions in the elderly.          Aspirin is not on active medication list.  Aspirin use is not indicated based on review of current medical condition/s. Risk of harm outweighs potential benefits.  .    Patient Active Problem List   Diagnosis   • Generalized anxiety disorder   • Chronic depression   • Bilateral primary osteoarthritis of hip   • Disorder of SI (sacroiliac) joint   • Other hyperlipidemia   • Essential hypertension   • Presence of right artificial hip joint   • Exudative age-related macular degeneration, right eye, with inactive choroidal neovascularization   • Scalp psoriasis     Advance Care Planning   Advance Care Planning     Advance Directive is on file.  ACP discussion was held with the patient during this visit. Patient has an advance directive in EMR which is still valid.      Objective    There were no vitals filed for this visit.  Estimated body mass index is 25.5 kg/m² as calculated from the following:    Height as of 11/18/22: 167.6 cm (66\").    Weight as of 11/18/22: 71.7 kg (158 lb).    BMI is >= 25 and <30. (Overweight) The following options were offered after discussion;: exercise counseling/recommendations and nutrition counseling/recommendations      Does the patient have evidence of cognitive impairment?   No            HEALTH RISK ASSESSMENT    Smoking Status:  Social History     Tobacco Use   Smoking Status Former   • Packs/day: 1.00   • " Years: 10.00   • Pack years: 10.00   • Types: Cigarettes   • Quit date: 1976   • Years since quittin.4   Smokeless Tobacco Never     Alcohol Consumption:  Social History     Substance and Sexual Activity   Alcohol Use Yes   • Alcohol/week: 2.0 standard drinks   • Types: 2 Glasses of wine per week     Fall Risk Screen:    HERNANDEZSURESH Fall Risk Assessment was completed, and patient is at LOW risk for falls.Assessment completed on:2023    Depression Screenin/18/2023     9:00 AM   PHQ-2/PHQ-9 Depression Screening   Little Interest or Pleasure in Doing Things 0-->not at all   Feeling Down, Depressed or Hopeless 1-->several days   PHQ-9: Brief Depression Severity Measure Score 1       Health Habits and Functional and Cognitive Screenin/18/2023     9:00 AM   Functional & Cognitive Status   Do you have difficulty preparing food and eating? No   Do you have difficulty bathing yourself, getting dressed or grooming yourself? No   Do you have difficulty using the toilet? No   Do you have difficulty moving around from place to place? No   Do you have trouble with steps or getting out of a bed or a chair? No   Current Diet Well Balanced Diet   Dental Exam Up to date   Eye Exam Up to date   Exercise (times per week) Other   Current Exercises Include Other   Do you need help using the phone?  No   Are you deaf or do you have serious difficulty hearing?  No   Do you need help with transportation? No   Do you need help shopping? No   Do you need help preparing meals?  No   Do you need help with housework?  No   Do you need help with laundry? No   Do you need help taking your medications? No   Do you need help managing money? No   Do you ever drive or ride in a car without wearing a seat belt? No   Have you felt unusual stress, anger or loneliness in the last month? No   Who do you live with? Spouse   If you need help, do you have trouble finding someone available to you? No   Have you been bothered in the  last four weeks by sexual problems? No   Do you have difficulty concentrating, remembering or making decisions? No       Age-appropriate Screening Schedule:  Refer to the list below for future screening recommendations based on patient's age, sex and/or medical conditions. Orders for these recommended tests are listed in the plan section. The patient has been provided with a written plan.    Health Maintenance   Topic Date Due   • MAMMOGRAM  01/07/2023   • ANNUAL WELLNESS VISIT  05/19/2023   • INFLUENZA VACCINE  08/01/2023   • LIPID PANEL  11/16/2023   • TDAP/TD VACCINES (3 - Td or Tdap) 06/04/2032   • HEPATITIS C SCREENING  Completed   • COVID-19 Vaccine  Completed   • Pneumococcal Vaccine 65+  Discontinued   • DXA SCAN  Discontinued   • ZOSTER VACCINE  Discontinued   • COLORECTAL CANCER SCREENING  Discontinued                  CMS Preventative Services Quick Reference  Risk Factors Identified During Encounter:    Immunizations Discussed/Encouraged: Influenza and COVID19    The above risks/problems have been discussed with the patient.  Pertinent information has been shared with the patient in the After Visit Summary.    Diagnoses and all orders for this visit:    1. Essential hypertension (Primary)  -     Comprehensive Metabolic Panel; Future  -     Magnesium; Future    2. Other hyperlipidemia  -     Lipid Panel With LDL / HDL Ratio; Future    3. Generalized anxiety disorder    4. Chronic depression    5. Medicare annual wellness visit, subsequent    6. Screen for colon cancer  -     Cologuard - Stool, Per Rectum; Future    7. Exudative age-related macular degeneration, right eye, with inactive choroidal neovascularization        Follow Up:   Next Medicare Wellness visit to be scheduled in 1 year.      An After Visit Summary and PPPS were made available to the patient.

## 2023-05-18 NOTE — PROGRESS NOTES
Subjective   Brunilda Horner is a 78 y.o. female.  Reevaluation hypertension hyperlipidemia chronic anxiety depression controlled history of osteoarthritis hip replacement.  In the interim is had cataract surgery vision has increased greatly.  Enforcer still has macular degeneration but overall vision has improved.  Has electively lost about 8 to 10 pounds with effort.  Medicines have remained the same antidepressants working well.  Due for mammogram this spring also due for cologuard in the fall.  Is up-to-date on all other.    History of Present Illness   HPI    The following portions of the patient's history were reviewed and updated as appropriate: allergies, current medications, past family history, past medical history, past social history, past surgical history and problem list.    Review of Systems  Review of Systems   Constitutional: Negative for activity change, appetite change, fatigue and unexpected weight change.   HENT: Negative for trouble swallowing and voice change.    Eyes: Positive for visual disturbance (Carol improved). Negative for redness.   Respiratory: Negative for cough and wheezing.    Cardiovascular: Negative for chest pain and palpitations.   Gastrointestinal: Negative for abdominal pain, constipation, diarrhea, nausea and vomiting.   Genitourinary: Negative for urgency.   Musculoskeletal: Positive for arthralgias. Negative for joint swelling.   Neurological: Negative for syncope and headaches.   Hematological: Negative for adenopathy.   Psychiatric/Behavioral: Negative for sleep disturbance.       Objective   Physical Exam  Physical Exam  Constitutional:       Appearance: She is well-developed.   HENT:      Head: Normocephalic.   Eyes:      Pupils: Pupils are equal, round, and reactive to light.   Neck:      Thyroid: No thyromegaly.   Cardiovascular:      Rate and Rhythm: Normal rate and regular rhythm.      Heart sounds: Normal heart sounds. No murmur heard.    No friction rub. No  "gallop.   Pulmonary:      Breath sounds: Normal breath sounds.   Abdominal:      General: There is no distension.      Palpations: Abdomen is soft. There is no mass.      Tenderness: There is no abdominal tenderness.   Musculoskeletal:         General: Normal range of motion.      Cervical back: Normal range of motion.      Comments: 2+ pulses get up and go 3 to 5 seconds minimally antalgic gait   Skin:     General: Skin is warm and dry.   Neurological:      Mental Status: She is alert and oriented to person, place, and time.      Deep Tendon Reflexes: Reflexes are normal and symmetric.   Psychiatric:         Attention and Perception: Attention normal.         Mood and Affect: Mood normal.         Speech: Speech normal.         Behavior: Behavior normal.         Thought Content: Thought content normal.         Cognition and Memory: Cognition normal.           Visit Vitals  /78   Pulse 99   Ht 167.6 cm (66\")   Wt 70.1 kg (154 lb 8 oz)   LMP  (LMP Unknown)   SpO2 100%   BMI 24.94 kg/m²     Body mass index is 24.94 kg/m².  /78   Pulse 99   Ht 167.6 cm (66\")   Wt 70.1 kg (154 lb 8 oz)   LMP  (LMP Unknown)   SpO2 100%   BMI 24.94 kg/m²       Assessment/Plan   Diagnoses and all orders for this visit:    1. Vision changes (Primary)    2. Essential hypertension  -     Comprehensive Metabolic Panel; Future  -     Magnesium; Future    3. Other hyperlipidemia  -     Lipid Panel With LDL / HDL Ratio; Future    4. Generalized anxiety disorder    5. Chronic depression    6. Medicare annual wellness visit, subsequent    7. Screen for colon cancer  -     Cologuard - Stool, Per Rectum; Future    8. Exudative age-related macular degeneration, right eye, with inactive choroidal neovascularization    Counseled on fall prevention increasing hydration no vision changes.  Counseled on infection prevention recheck 6 months lab prior orders as above  "

## 2023-07-13 PROBLEM — M25.552 LEFT HIP PAIN: Status: ACTIVE | Noted: 2023-07-13

## 2023-07-16 PROBLEM — M16.12 PRIMARY OSTEOARTHRITIS OF LEFT HIP: Status: ACTIVE | Noted: 2023-07-16

## 2023-07-31 DIAGNOSIS — M16.0 BILATERAL PRIMARY OSTEOARTHRITIS OF HIP: Chronic | ICD-10-CM

## 2023-07-31 RX ORDER — TIZANIDINE 4 MG/1
TABLET ORAL
Qty: 90 TABLET | Refills: 3 | Status: SHIPPED | OUTPATIENT
Start: 2023-07-31

## 2023-09-07 DIAGNOSIS — F32.A CHRONIC DEPRESSION: Chronic | ICD-10-CM

## 2023-09-07 RX ORDER — TEMAZEPAM 30 MG/1
30 CAPSULE ORAL NIGHTLY PRN
Qty: 30 CAPSULE | Refills: 5 | Status: SHIPPED | OUTPATIENT
Start: 2023-09-07

## (undated) DEVICE — PK HIP ANT LF 60

## (undated) DEVICE — TRAY,FOLEY INSERTION,PVP,10ML SYRINGE: Brand: MEDLINE

## (undated) DEVICE — GOWN,AURORA,NOREINF,RAGLAN,XL,STERILE: Brand: MEDLINE

## (undated) DEVICE — GOWN,PREVENTION PLUS,XLONG/XLARGE,STRL: Brand: MEDLINE

## (undated) DEVICE — GLV SURG SENSICARE PI PF LF 7 GRN STRL

## (undated) DEVICE — CATHETER,URETHRAL,REDRUBBER,STRL,16FR: Brand: MEDLINE

## (undated) DEVICE — GLV SURG SENSICARE PI LF PF 7.5 GRN STRL

## (undated) DEVICE — SOL IRR NACL 0.9PCT BT 1000ML

## (undated) DEVICE — 6617 IOBAN II PATIENT ISOLATION DRAPE 5/BX,4BX/CS: Brand: STERI-DRAPE™ IOBAN™ 2

## (undated) DEVICE — CLTH CLENS READYCLEANSE PERI CARE PK/5

## (undated) DEVICE — STERILE POLYISOPRENE POWDER-FREE SURGICAL GLOVES WITH EMOLLIENT COATING: Brand: PROTEXIS

## (undated) DEVICE — SYS SKIN CLS DERMABOND PRINEO W/22CM MESH TP

## (undated) DEVICE — GLV SURG TRIUMPH LT PF LTX 8 STRL

## (undated) DEVICE — SOL IRR H2O BG 1000ML STRL

## (undated) DEVICE — GLV SURG NEOLON 2G PF LF 6.5 STRL

## (undated) DEVICE — GLV SURG TRIUMPH PF LTX 7 STRL

## (undated) DEVICE — GLV SURG SENSICARE POLYISPRN W/ALOE PF LF 6.5 GRN STRL

## (undated) DEVICE — HOOD, PEEL-AWAY: Brand: FLYTE

## (undated) DEVICE — SOL IRRIG NACL 1000ML

## (undated) DEVICE — DUAL CUT SAGITTAL BLADE

## (undated) DEVICE — CVR SURG EQUIP BND RECTG 36X28